# Patient Record
Sex: MALE | Race: WHITE | Employment: OTHER | URBAN - NONMETROPOLITAN AREA
[De-identification: names, ages, dates, MRNs, and addresses within clinical notes are randomized per-mention and may not be internally consistent; named-entity substitution may affect disease eponyms.]

---

## 2017-01-04 ENCOUNTER — OFFICE VISIT (OUTPATIENT)
Dept: CARDIOLOGY | Age: 72
End: 2017-01-04
Payer: MEDICARE

## 2017-01-04 VITALS
HEART RATE: 68 BPM | DIASTOLIC BLOOD PRESSURE: 82 MMHG | SYSTOLIC BLOOD PRESSURE: 120 MMHG | WEIGHT: 221 LBS | BODY MASS INDEX: 31.64 KG/M2 | HEIGHT: 70 IN

## 2017-01-04 DIAGNOSIS — I10 ESSENTIAL HYPERTENSION: Primary | ICD-10-CM

## 2017-01-04 DIAGNOSIS — E78.2 MIXED HYPERLIPIDEMIA: ICD-10-CM

## 2017-01-04 PROCEDURE — 99212 OFFICE O/P EST SF 10 MIN: CPT | Performed by: INTERNAL MEDICINE

## 2017-02-20 RX ORDER — SPIRONOLACTONE 25 MG/1
25 TABLET ORAL DAILY
Qty: 90 TABLET | Refills: 5 | Status: SHIPPED | OUTPATIENT
Start: 2017-02-20 | End: 2017-08-30

## 2017-02-20 RX ORDER — LISINOPRIL 10 MG/1
10 TABLET ORAL 2 TIMES DAILY
Qty: 180 TABLET | Refills: 5 | Status: SHIPPED | OUTPATIENT
Start: 2017-02-20

## 2017-02-20 RX ORDER — CARVEDILOL 3.12 MG/1
3.12 TABLET ORAL 2 TIMES DAILY WITH MEALS
Qty: 180 TABLET | Refills: 5 | Status: SHIPPED | OUTPATIENT
Start: 2017-02-20 | End: 2017-08-30

## 2017-08-30 ENCOUNTER — OFFICE VISIT (OUTPATIENT)
Dept: CARDIOLOGY | Age: 72
End: 2017-08-30
Payer: MEDICARE

## 2017-08-30 VITALS
HEART RATE: 68 BPM | DIASTOLIC BLOOD PRESSURE: 78 MMHG | SYSTOLIC BLOOD PRESSURE: 138 MMHG | HEIGHT: 69 IN | BODY MASS INDEX: 31.84 KG/M2 | WEIGHT: 215 LBS

## 2017-08-30 DIAGNOSIS — R07.89 CHEST PRESSURE: ICD-10-CM

## 2017-08-30 DIAGNOSIS — E78.2 MIXED HYPERLIPIDEMIA: ICD-10-CM

## 2017-08-30 DIAGNOSIS — I10 ESSENTIAL HYPERTENSION: Primary | ICD-10-CM

## 2017-08-30 PROCEDURE — 3017F COLORECTAL CA SCREEN DOC REV: CPT | Performed by: INTERNAL MEDICINE

## 2017-08-30 PROCEDURE — 4040F PNEUMOC VAC/ADMIN/RCVD: CPT | Performed by: INTERNAL MEDICINE

## 2017-08-30 PROCEDURE — 1036F TOBACCO NON-USER: CPT | Performed by: INTERNAL MEDICINE

## 2017-08-30 PROCEDURE — 1123F ACP DISCUSS/DSCN MKR DOCD: CPT | Performed by: INTERNAL MEDICINE

## 2017-08-30 PROCEDURE — G8427 DOCREV CUR MEDS BY ELIG CLIN: HCPCS | Performed by: INTERNAL MEDICINE

## 2017-08-30 PROCEDURE — G8419 CALC BMI OUT NRM PARAM NOF/U: HCPCS | Performed by: INTERNAL MEDICINE

## 2017-08-30 PROCEDURE — 99213 OFFICE O/P EST LOW 20 MIN: CPT | Performed by: INTERNAL MEDICINE

## 2020-11-20 ENCOUNTER — OFFICE VISIT (OUTPATIENT)
Dept: PRIMARY CARE CLINIC | Age: 75
End: 2020-11-20
Payer: MEDICARE

## 2020-11-20 VITALS — TEMPERATURE: 97.7 F

## 2020-11-20 PROCEDURE — G0008 ADMIN INFLUENZA VIRUS VAC: HCPCS | Performed by: NURSE PRACTITIONER

## 2020-11-20 PROCEDURE — 90694 VACC AIIV4 NO PRSRV 0.5ML IM: CPT | Performed by: NURSE PRACTITIONER

## 2020-11-20 NOTE — PROGRESS NOTES
After obtaining consent, and per orders of Leanne Sosa, injection of Flu given by [place]. Patient instructed to remain in clinic for 20 minutes afterwards, and to report any adverse reaction to me immediately.

## 2020-11-20 NOTE — PROGRESS NOTES
After obtaining consent, and per orders of Aaron Matthew, injection of FLU Vaccine given by Cody Thacker. Patient instructed to remain in clinic for 20 minutes afterwards, and to report any adverse reaction to me immediately.

## 2021-10-14 ENCOUNTER — PREP FOR SURGERY (OUTPATIENT)
Dept: OTHER | Facility: HOSPITAL | Age: 76
End: 2021-10-14

## 2021-10-14 ENCOUNTER — OFFICE VISIT (OUTPATIENT)
Dept: CARDIOLOGY | Facility: CLINIC | Age: 76
End: 2021-10-14

## 2021-10-14 VITALS
DIASTOLIC BLOOD PRESSURE: 80 MMHG | HEART RATE: 68 BPM | HEIGHT: 70 IN | SYSTOLIC BLOOD PRESSURE: 136 MMHG | OXYGEN SATURATION: 98 % | WEIGHT: 219 LBS | BODY MASS INDEX: 31.35 KG/M2

## 2021-10-14 DIAGNOSIS — I48.19 ATRIAL FIBRILLATION, PERSISTENT (HCC): Primary | ICD-10-CM

## 2021-10-14 DIAGNOSIS — I10 PRIMARY HYPERTENSION: ICD-10-CM

## 2021-10-14 DIAGNOSIS — I42.8 NON-ISCHEMIC CARDIOMYOPATHY (HCC): ICD-10-CM

## 2021-10-14 PROCEDURE — 93000 ELECTROCARDIOGRAM COMPLETE: CPT | Performed by: INTERNAL MEDICINE

## 2021-10-14 PROCEDURE — 99204 OFFICE O/P NEW MOD 45 MIN: CPT | Performed by: INTERNAL MEDICINE

## 2021-10-14 RX ORDER — ALLOPURINOL 300 MG/1
TABLET ORAL
COMMUNITY

## 2021-10-14 RX ORDER — SPIRONOLACTONE 25 MG/1
25 TABLET ORAL DAILY
COMMUNITY

## 2021-10-14 RX ORDER — LANOLIN ALCOHOL/MO/W.PET/CERES
2000 CREAM (GRAM) TOPICAL DAILY
COMMUNITY

## 2021-10-14 RX ORDER — ATORVASTATIN CALCIUM 20 MG/1
20 TABLET, FILM COATED ORAL DAILY
COMMUNITY

## 2021-10-14 RX ORDER — LISINOPRIL 20 MG/1
20 TABLET ORAL DAILY
COMMUNITY

## 2021-10-14 RX ORDER — TAMSULOSIN HYDROCHLORIDE 0.4 MG/1
1 CAPSULE ORAL DAILY
COMMUNITY

## 2021-10-14 RX ORDER — CHOLECALCIFEROL (VITAMIN D3) 50 MCG
2 TABLET ORAL DAILY
COMMUNITY

## 2021-10-18 ENCOUNTER — LAB (OUTPATIENT)
Dept: LAB | Facility: HOSPITAL | Age: 76
End: 2021-10-18

## 2021-10-18 DIAGNOSIS — I48.19 ATRIAL FIBRILLATION, PERSISTENT (HCC): ICD-10-CM

## 2021-10-18 LAB — SARS-COV-2 ORF1AB RESP QL NAA+PROBE: NOT DETECTED

## 2021-10-18 PROCEDURE — C9803 HOPD COVID-19 SPEC COLLECT: HCPCS

## 2021-10-18 PROCEDURE — U0004 COV-19 TEST NON-CDC HGH THRU: HCPCS

## 2021-10-18 PROCEDURE — U0005 INFEC AGEN DETEC AMPLI PROBE: HCPCS

## 2021-10-19 ENCOUNTER — ANESTHESIA EVENT (OUTPATIENT)
Dept: CARDIOLOGY | Facility: HOSPITAL | Age: 76
End: 2021-10-19

## 2021-10-20 ENCOUNTER — HOSPITAL ENCOUNTER (OUTPATIENT)
Dept: CARDIOLOGY | Facility: HOSPITAL | Age: 76
Discharge: HOME | End: 2021-10-20

## 2021-10-20 ENCOUNTER — ANESTHESIA (OUTPATIENT)
Dept: CARDIOLOGY | Facility: HOSPITAL | Age: 76
End: 2021-10-20

## 2021-10-20 ENCOUNTER — HOSPITAL ENCOUNTER (OUTPATIENT)
Dept: CARDIOLOGY | Facility: HOSPITAL | Age: 76
Setting detail: HOSPITAL OUTPATIENT SURGERY
Discharge: HOME | End: 2021-10-20

## 2021-10-20 VITALS
OXYGEN SATURATION: 100 % | TEMPERATURE: 98.3 F | HEART RATE: 104 BPM | SYSTOLIC BLOOD PRESSURE: 133 MMHG | WEIGHT: 218.92 LBS | DIASTOLIC BLOOD PRESSURE: 87 MMHG | BODY MASS INDEX: 34.36 KG/M2 | HEIGHT: 67 IN | RESPIRATION RATE: 19 BRPM

## 2021-10-20 DIAGNOSIS — I48.19 ATRIAL FIBRILLATION, PERSISTENT (HCC): ICD-10-CM

## 2021-10-20 LAB
MAXIMAL PREDICTED HEART RATE: 145 BPM
STRESS TARGET HR: 123 BPM

## 2021-10-20 PROCEDURE — 25010000002 PROPOFOL 10 MG/ML EMULSION: Performed by: NURSE ANESTHETIST, CERTIFIED REGISTERED

## 2021-10-20 PROCEDURE — 93321 DOPPLER ECHO F-UP/LMTD STD: CPT

## 2021-10-20 PROCEDURE — 93312 ECHO TRANSESOPHAGEAL: CPT

## 2021-10-20 PROCEDURE — 92960 CARDIOVERSION ELECTRIC EXT: CPT

## 2021-10-20 PROCEDURE — 93325 DOPPLER ECHO COLOR FLOW MAPG: CPT | Performed by: INTERNAL MEDICINE

## 2021-10-20 PROCEDURE — 92960 CARDIOVERSION ELECTRIC EXT: CPT | Performed by: INTERNAL MEDICINE

## 2021-10-20 PROCEDURE — 93010 ELECTROCARDIOGRAM REPORT: CPT | Performed by: INTERNAL MEDICINE

## 2021-10-20 PROCEDURE — 93312 ECHO TRANSESOPHAGEAL: CPT | Performed by: INTERNAL MEDICINE

## 2021-10-20 PROCEDURE — 93321 DOPPLER ECHO F-UP/LMTD STD: CPT | Performed by: INTERNAL MEDICINE

## 2021-10-20 PROCEDURE — 93005 ELECTROCARDIOGRAM TRACING: CPT | Performed by: INTERNAL MEDICINE

## 2021-10-20 PROCEDURE — 93325 DOPPLER ECHO COLOR FLOW MAPG: CPT

## 2021-10-20 RX ORDER — AMIODARONE HYDROCHLORIDE 400 MG/1
400 TABLET ORAL 2 TIMES DAILY
Qty: 30 TABLET | Refills: 0 | Status: SHIPPED | OUTPATIENT
Start: 2021-10-20 | End: 2021-11-05 | Stop reason: HOSPADM

## 2021-10-20 RX ORDER — LIDOCAINE HYDROCHLORIDE 20 MG/ML
INJECTION, SOLUTION EPIDURAL; INFILTRATION; INTRACAUDAL; PERINEURAL AS NEEDED
Status: DISCONTINUED | OUTPATIENT
Start: 2021-10-20 | End: 2021-10-20 | Stop reason: SURG

## 2021-10-20 RX ORDER — PROPOFOL 10 MG/ML
VIAL (ML) INTRAVENOUS AS NEEDED
Status: DISCONTINUED | OUTPATIENT
Start: 2021-10-20 | End: 2021-10-20 | Stop reason: SURG

## 2021-10-20 RX ORDER — SODIUM CHLORIDE 9 MG/ML
INJECTION, SOLUTION INTRAVENOUS CONTINUOUS PRN
Status: DISCONTINUED | OUTPATIENT
Start: 2021-10-20 | End: 2021-10-20 | Stop reason: SURG

## 2021-10-20 RX ADMIN — LIDOCAINE HYDROCHLORIDE 200 MG: 20 INJECTION, SOLUTION EPIDURAL; INFILTRATION; INTRACAUDAL; PERINEURAL at 11:01

## 2021-10-20 RX ADMIN — SODIUM CHLORIDE: 9 INJECTION, SOLUTION INTRAVENOUS at 10:55

## 2021-10-20 RX ADMIN — PROPOFOL 200 MG: 10 INJECTION, EMULSION INTRAVENOUS at 11:01

## 2021-10-20 RX ADMIN — PROPOFOL 100 MG: 10 INJECTION, EMULSION INTRAVENOUS at 11:13

## 2021-10-21 LAB
QT INTERVAL: 368 MS
QTC INTERVAL: 507 MS

## 2021-10-22 ENCOUNTER — PREP FOR SURGERY (OUTPATIENT)
Dept: OTHER | Facility: HOSPITAL | Age: 76
End: 2021-10-22

## 2021-10-22 DIAGNOSIS — I48.19 ATRIAL FIBRILLATION, PERSISTENT (HCC): Primary | ICD-10-CM

## 2021-11-02 ENCOUNTER — LAB (OUTPATIENT)
Dept: LAB | Facility: HOSPITAL | Age: 76
End: 2021-11-02

## 2021-11-02 DIAGNOSIS — I48.19 ATRIAL FIBRILLATION, PERSISTENT (HCC): ICD-10-CM

## 2021-11-02 LAB — SARS-COV-2 ORF1AB RESP QL NAA+PROBE: NOT DETECTED

## 2021-11-02 PROCEDURE — U0004 COV-19 TEST NON-CDC HGH THRU: HCPCS

## 2021-11-02 PROCEDURE — C9803 HOPD COVID-19 SPEC COLLECT: HCPCS

## 2021-11-02 PROCEDURE — U0005 INFEC AGEN DETEC AMPLI PROBE: HCPCS

## 2021-11-05 ENCOUNTER — ANESTHESIA (OUTPATIENT)
Dept: CARDIOLOGY | Facility: HOSPITAL | Age: 76
End: 2021-11-05

## 2021-11-05 ENCOUNTER — HOSPITAL ENCOUNTER (OUTPATIENT)
Dept: CARDIOLOGY | Facility: HOSPITAL | Age: 76
Discharge: HOME | End: 2021-11-05

## 2021-11-05 ENCOUNTER — ANESTHESIA EVENT (OUTPATIENT)
Dept: CARDIOLOGY | Facility: HOSPITAL | Age: 76
End: 2021-11-05

## 2021-11-05 VITALS
DIASTOLIC BLOOD PRESSURE: 75 MMHG | BODY MASS INDEX: 31.26 KG/M2 | OXYGEN SATURATION: 98 % | RESPIRATION RATE: 18 BRPM | HEIGHT: 70 IN | WEIGHT: 218.4 LBS | SYSTOLIC BLOOD PRESSURE: 132 MMHG | HEART RATE: 76 BPM | TEMPERATURE: 97 F

## 2021-11-05 DIAGNOSIS — I48.19 ATRIAL FIBRILLATION, PERSISTENT (HCC): ICD-10-CM

## 2021-11-05 DIAGNOSIS — I48.21 PERMANENT ATRIAL FIBRILLATION (HCC): ICD-10-CM

## 2021-11-05 LAB
BH CV ECHO MEAS - TR MAX PG: 40 MMHG
BH CV ECHO MEAS - TR MAX VEL: 3 CM/SEC
MAXIMAL PREDICTED HEART RATE: 145 BPM
MAXIMAL PREDICTED HEART RATE: 145 BPM
STRESS TARGET HR: 123 BPM
STRESS TARGET HR: 123 BPM

## 2021-11-05 PROCEDURE — 93010 ELECTROCARDIOGRAM REPORT: CPT | Performed by: INTERNAL MEDICINE

## 2021-11-05 PROCEDURE — 92960 CARDIOVERSION ELECTRIC EXT: CPT

## 2021-11-05 PROCEDURE — 92960 CARDIOVERSION ELECTRIC EXT: CPT | Performed by: INTERNAL MEDICINE

## 2021-11-05 PROCEDURE — 93005 ELECTROCARDIOGRAM TRACING: CPT | Performed by: INTERNAL MEDICINE

## 2021-11-05 PROCEDURE — 25010000002 PROPOFOL 10 MG/ML EMULSION

## 2021-11-05 RX ORDER — SODIUM CHLORIDE 0.9 % (FLUSH) 0.9 %
10 SYRINGE (ML) INJECTION EVERY 12 HOURS SCHEDULED
Status: DISCONTINUED | OUTPATIENT
Start: 2021-11-05 | End: 2021-11-06 | Stop reason: HOSPADM

## 2021-11-05 RX ORDER — PROPOFOL 10 MG/ML
VIAL (ML) INTRAVENOUS AS NEEDED
Status: DISCONTINUED | OUTPATIENT
Start: 2021-11-05 | End: 2021-11-05 | Stop reason: SURG

## 2021-11-05 RX ORDER — SODIUM CHLORIDE 0.9 % (FLUSH) 0.9 %
10 SYRINGE (ML) INJECTION AS NEEDED
Status: DISCONTINUED | OUTPATIENT
Start: 2021-11-05 | End: 2021-11-06 | Stop reason: HOSPADM

## 2021-11-05 RX ORDER — LIDOCAINE HYDROCHLORIDE 20 MG/ML
INJECTION, SOLUTION EPIDURAL; INFILTRATION; INTRACAUDAL; PERINEURAL AS NEEDED
Status: DISCONTINUED | OUTPATIENT
Start: 2021-11-05 | End: 2021-11-05 | Stop reason: SURG

## 2021-11-05 RX ADMIN — LIDOCAINE HYDROCHLORIDE 40 MG: 20 INJECTION, SOLUTION EPIDURAL; INFILTRATION; INTRACAUDAL; PERINEURAL at 08:15

## 2021-11-05 RX ADMIN — PROPOFOL 100 MG: 10 INJECTION, EMULSION INTRAVENOUS at 08:21

## 2021-11-05 RX ADMIN — PROPOFOL 100 MG: 10 INJECTION, EMULSION INTRAVENOUS at 08:15

## 2021-11-06 LAB
QT INTERVAL: 438 MS
QTC INTERVAL: 517 MS

## 2022-02-11 ENCOUNTER — OFFICE VISIT (OUTPATIENT)
Dept: CARDIOLOGY | Facility: CLINIC | Age: 77
End: 2022-02-11

## 2022-02-11 VITALS
BODY MASS INDEX: 31.35 KG/M2 | DIASTOLIC BLOOD PRESSURE: 73 MMHG | WEIGHT: 219 LBS | HEART RATE: 114 BPM | SYSTOLIC BLOOD PRESSURE: 126 MMHG | OXYGEN SATURATION: 95 % | HEIGHT: 70 IN

## 2022-02-11 DIAGNOSIS — I10 PRIMARY HYPERTENSION: ICD-10-CM

## 2022-02-11 DIAGNOSIS — I48.21 PERMANENT ATRIAL FIBRILLATION: Primary | ICD-10-CM

## 2022-02-11 DIAGNOSIS — I42.8 NON-ISCHEMIC CARDIOMYOPATHY: ICD-10-CM

## 2022-02-11 PROCEDURE — 99214 OFFICE O/P EST MOD 30 MIN: CPT | Performed by: INTERNAL MEDICINE

## 2022-02-11 PROCEDURE — 93000 ELECTROCARDIOGRAM COMPLETE: CPT | Performed by: INTERNAL MEDICINE

## 2022-02-11 RX ORDER — METOPROLOL SUCCINATE 25 MG/1
25 TABLET, EXTENDED RELEASE ORAL
Qty: 90 TABLET | Refills: 3 | Status: SHIPPED | OUTPATIENT
Start: 2022-02-11 | End: 2022-09-21 | Stop reason: SDUPTHER

## 2022-03-30 ENCOUNTER — OFFICE VISIT (OUTPATIENT)
Dept: GASTROENTEROLOGY | Facility: CLINIC | Age: 77
End: 2022-03-30

## 2022-03-30 VITALS
WEIGHT: 217 LBS | BODY MASS INDEX: 32.14 KG/M2 | SYSTOLIC BLOOD PRESSURE: 130 MMHG | DIASTOLIC BLOOD PRESSURE: 74 MMHG | HEART RATE: 74 BPM | OXYGEN SATURATION: 100 % | TEMPERATURE: 98 F | HEIGHT: 69 IN

## 2022-03-30 DIAGNOSIS — Z79.01 ANTICOAGULATED: Primary | ICD-10-CM

## 2022-03-30 DIAGNOSIS — D64.9 ANEMIA, UNSPECIFIED TYPE: ICD-10-CM

## 2022-03-30 PROBLEM — Z12.11 ENCOUNTER FOR SCREENING FOR MALIGNANT NEOPLASM OF COLON: Status: ACTIVE | Noted: 2022-03-30

## 2022-03-30 PROCEDURE — 99204 OFFICE O/P NEW MOD 45 MIN: CPT | Performed by: NURSE PRACTITIONER

## 2022-03-30 RX ORDER — POLYETHYLENE GLYCOL 3350 17 G/17G
238 POWDER, FOR SOLUTION ORAL ONCE
Qty: 238 G | Refills: 0 | Status: SHIPPED | OUTPATIENT
Start: 2022-03-30 | End: 2022-03-30

## 2022-03-30 RX ORDER — ALBUTEROL SULFATE 90 UG/1
2 AEROSOL, METERED RESPIRATORY (INHALATION) EVERY 4 HOURS PRN
COMMUNITY

## 2022-04-15 ENCOUNTER — HOSPITAL ENCOUNTER (OUTPATIENT)
Facility: HOSPITAL | Age: 77
Setting detail: HOSPITAL OUTPATIENT SURGERY
Discharge: HOME | End: 2022-04-15
Attending: INTERNAL MEDICINE | Admitting: INTERNAL MEDICINE

## 2022-04-15 ENCOUNTER — TELEPHONE (OUTPATIENT)
Dept: GASTROENTEROLOGY | Facility: CLINIC | Age: 77
End: 2022-04-15

## 2022-04-15 ENCOUNTER — ANESTHESIA EVENT (OUTPATIENT)
Dept: GASTROENTEROLOGY | Facility: HOSPITAL | Age: 77
End: 2022-04-15

## 2022-04-15 ENCOUNTER — ANESTHESIA (OUTPATIENT)
Dept: GASTROENTEROLOGY | Facility: HOSPITAL | Age: 77
End: 2022-04-15

## 2022-04-15 VITALS
HEART RATE: 108 BPM | HEIGHT: 67 IN | SYSTOLIC BLOOD PRESSURE: 126 MMHG | DIASTOLIC BLOOD PRESSURE: 83 MMHG | BODY MASS INDEX: 34.53 KG/M2 | OXYGEN SATURATION: 99 % | WEIGHT: 220 LBS | TEMPERATURE: 97.2 F | RESPIRATION RATE: 18 BRPM

## 2022-04-15 DIAGNOSIS — D64.9 ANEMIA, UNSPECIFIED TYPE: ICD-10-CM

## 2022-04-15 PROCEDURE — 25010000002 PROPOFOL 10 MG/ML EMULSION: Performed by: NURSE ANESTHETIST, CERTIFIED REGISTERED

## 2022-04-15 PROCEDURE — 45385 COLONOSCOPY W/LESION REMOVAL: CPT | Performed by: INTERNAL MEDICINE

## 2022-04-15 PROCEDURE — 88305 TISSUE EXAM BY PATHOLOGIST: CPT | Performed by: INTERNAL MEDICINE

## 2022-04-15 RX ORDER — SODIUM CHLORIDE 0.9 % (FLUSH) 0.9 %
10 SYRINGE (ML) INJECTION AS NEEDED
Status: DISCONTINUED | OUTPATIENT
Start: 2022-04-15 | End: 2022-04-15 | Stop reason: HOSPADM

## 2022-04-15 RX ORDER — LIDOCAINE HYDROCHLORIDE 20 MG/ML
INJECTION, SOLUTION EPIDURAL; INFILTRATION; INTRACAUDAL; PERINEURAL AS NEEDED
Status: DISCONTINUED | OUTPATIENT
Start: 2022-04-15 | End: 2022-04-15 | Stop reason: SURG

## 2022-04-15 RX ORDER — SODIUM CHLORIDE 9 MG/ML
500 INJECTION, SOLUTION INTRAVENOUS CONTINUOUS PRN
Status: DISCONTINUED | OUTPATIENT
Start: 2022-04-15 | End: 2022-04-15 | Stop reason: HOSPADM

## 2022-04-15 RX ORDER — SODIUM CHLORIDE 0.9 % (FLUSH) 0.9 %
10 SYRINGE (ML) INJECTION EVERY 12 HOURS SCHEDULED
Status: CANCELLED | OUTPATIENT
Start: 2022-04-15

## 2022-04-15 RX ORDER — PROPOFOL 10 MG/ML
VIAL (ML) INTRAVENOUS AS NEEDED
Status: DISCONTINUED | OUTPATIENT
Start: 2022-04-15 | End: 2022-04-15 | Stop reason: SURG

## 2022-04-15 RX ORDER — SODIUM CHLORIDE 9 MG/ML
100 INJECTION, SOLUTION INTRAVENOUS CONTINUOUS
Status: CANCELLED | OUTPATIENT
Start: 2022-04-15

## 2022-04-15 RX ORDER — SODIUM CHLORIDE 0.9 % (FLUSH) 0.9 %
10 SYRINGE (ML) INJECTION AS NEEDED
Status: CANCELLED | OUTPATIENT
Start: 2022-04-15

## 2022-04-15 RX ADMIN — SODIUM CHLORIDE 500 ML: 9 INJECTION, SOLUTION INTRAVENOUS at 07:58

## 2022-04-15 RX ADMIN — LIDOCAINE HYDROCHLORIDE 50 MG: 20 INJECTION, SOLUTION EPIDURAL; INFILTRATION; INTRACAUDAL; PERINEURAL at 09:22

## 2022-04-15 RX ADMIN — PROPOFOL 250 MG: 10 INJECTION, EMULSION INTRAVENOUS at 09:22

## 2022-04-18 LAB
CYTO UR: NORMAL
LAB AP CASE REPORT: NORMAL
PATH REPORT.FINAL DX SPEC: NORMAL
PATH REPORT.GROSS SPEC: NORMAL

## 2022-09-21 ENCOUNTER — OFFICE VISIT (OUTPATIENT)
Dept: CARDIOLOGY | Facility: CLINIC | Age: 77
End: 2022-09-21

## 2022-09-21 VITALS
DIASTOLIC BLOOD PRESSURE: 72 MMHG | BODY MASS INDEX: 34.72 KG/M2 | HEIGHT: 67 IN | HEART RATE: 80 BPM | SYSTOLIC BLOOD PRESSURE: 128 MMHG | OXYGEN SATURATION: 100 % | WEIGHT: 221.2 LBS

## 2022-09-21 DIAGNOSIS — I42.8 NON-ISCHEMIC CARDIOMYOPATHY: ICD-10-CM

## 2022-09-21 DIAGNOSIS — I48.21 PERMANENT ATRIAL FIBRILLATION: Primary | ICD-10-CM

## 2022-09-21 DIAGNOSIS — I10 PRIMARY HYPERTENSION: ICD-10-CM

## 2022-09-21 PROCEDURE — 99214 OFFICE O/P EST MOD 30 MIN: CPT | Performed by: NURSE PRACTITIONER

## 2022-09-21 PROCEDURE — 93000 ELECTROCARDIOGRAM COMPLETE: CPT | Performed by: NURSE PRACTITIONER

## 2022-09-21 RX ORDER — METOPROLOL SUCCINATE 50 MG/1
50 TABLET, EXTENDED RELEASE ORAL
Qty: 90 TABLET | Refills: 3 | Status: SHIPPED | OUTPATIENT
Start: 2022-09-21

## 2022-12-15 ENCOUNTER — TELEPHONE (OUTPATIENT)
Dept: CARDIOLOGY | Facility: CLINIC | Age: 77
End: 2022-12-15

## 2022-12-19 ENCOUNTER — TELEPHONE (OUTPATIENT)
Dept: CARDIOLOGY | Facility: CLINIC | Age: 77
End: 2022-12-19

## 2022-12-30 ENCOUNTER — PRE-ADMISSION TESTING (OUTPATIENT)
Dept: PREADMISSION TESTING | Facility: HOSPITAL | Age: 77
End: 2022-12-30
Payer: MEDICARE

## 2022-12-30 ENCOUNTER — HOSPITAL ENCOUNTER (OUTPATIENT)
Dept: GENERAL RADIOLOGY | Facility: HOSPITAL | Age: 77
Discharge: HOME | End: 2022-12-30
Payer: MEDICARE

## 2022-12-30 VITALS
SYSTOLIC BLOOD PRESSURE: 135 MMHG | OXYGEN SATURATION: 100 % | BODY MASS INDEX: 33.35 KG/M2 | RESPIRATION RATE: 24 BRPM | HEART RATE: 101 BPM | DIASTOLIC BLOOD PRESSURE: 82 MMHG | HEIGHT: 68 IN | WEIGHT: 220.02 LBS

## 2022-12-30 LAB
ALBUMIN SERPL-MCNC: 3.9 G/DL (ref 3.5–5.2)
ALBUMIN/GLOB SERPL: 1.7 G/DL
ALP SERPL-CCNC: 78 U/L (ref 39–117)
ALT SERPL W P-5'-P-CCNC: 10 U/L (ref 1–41)
ANION GAP SERPL CALCULATED.3IONS-SCNC: 11 MMOL/L (ref 5–15)
AST SERPL-CCNC: 18 U/L (ref 1–40)
BILIRUB SERPL-MCNC: 0.7 MG/DL (ref 0–1.2)
BILIRUB UR QL STRIP: NEGATIVE
BUN SERPL-MCNC: 11 MG/DL (ref 8–23)
BUN/CREAT SERPL: 12.4 (ref 7–25)
CALCIUM SPEC-SCNC: 8.5 MG/DL (ref 8.6–10.5)
CHLORIDE SERPL-SCNC: 103 MMOL/L (ref 98–107)
CLARITY UR: CLEAR
CO2 SERPL-SCNC: 24 MMOL/L (ref 22–29)
COLOR UR: ABNORMAL
CREAT SERPL-MCNC: 0.89 MG/DL (ref 0.76–1.27)
DEPRECATED RDW RBC AUTO: 62.6 FL (ref 37–54)
EGFRCR SERPLBLD CKD-EPI 2021: 88.8 ML/MIN/1.73
ERYTHROCYTE [DISTWIDTH] IN BLOOD BY AUTOMATED COUNT: 15.6 % (ref 12.3–15.4)
GLOBULIN UR ELPH-MCNC: 2.3 GM/DL
GLUCOSE SERPL-MCNC: 83 MG/DL (ref 65–99)
GLUCOSE UR STRIP-MCNC: NEGATIVE MG/DL
HCT VFR BLD AUTO: 32.5 % (ref 37.5–51)
HGB BLD-MCNC: 10.3 G/DL (ref 13–17.7)
HGB UR QL STRIP.AUTO: NEGATIVE
INR PPP: 2.54 (ref 0.91–1.09)
KETONES UR QL STRIP: ABNORMAL
LEUKOCYTE ESTERASE UR QL STRIP.AUTO: NEGATIVE
MCH RBC QN AUTO: 34.3 PG (ref 26.6–33)
MCHC RBC AUTO-ENTMCNC: 31.7 G/DL (ref 31.5–35.7)
MCV RBC AUTO: 108.3 FL (ref 79–97)
NITRITE UR QL STRIP: NEGATIVE
PH UR STRIP.AUTO: 5.5 [PH] (ref 5–8)
PLATELET # BLD AUTO: 157 10*3/MM3 (ref 140–450)
PMV BLD AUTO: 9.9 FL (ref 6–12)
POTASSIUM SERPL-SCNC: 4.4 MMOL/L (ref 3.5–5.2)
PROT SERPL-MCNC: 6.2 G/DL (ref 6–8.5)
PROT UR QL STRIP: NEGATIVE
PROTHROMBIN TIME: 27.7 SECONDS (ref 11.8–14.8)
RBC # BLD AUTO: 3 10*6/MM3 (ref 4.14–5.8)
SODIUM SERPL-SCNC: 138 MMOL/L (ref 136–145)
SP GR UR STRIP: 1.02 (ref 1–1.03)
UROBILINOGEN UR QL STRIP: ABNORMAL
WBC NRBC COR # BLD: 5.23 10*3/MM3 (ref 3.4–10.8)

## 2022-12-30 PROCEDURE — 85027 COMPLETE CBC AUTOMATED: CPT

## 2022-12-30 PROCEDURE — 71045 X-RAY EXAM CHEST 1 VIEW: CPT

## 2022-12-30 PROCEDURE — 93010 ELECTROCARDIOGRAM REPORT: CPT | Performed by: INTERNAL MEDICINE

## 2022-12-30 PROCEDURE — 81003 URINALYSIS AUTO W/O SCOPE: CPT

## 2022-12-30 PROCEDURE — 93005 ELECTROCARDIOGRAM TRACING: CPT

## 2022-12-30 PROCEDURE — 80053 COMPREHEN METABOLIC PANEL: CPT

## 2022-12-30 PROCEDURE — 85610 PROTHROMBIN TIME: CPT

## 2022-12-30 PROCEDURE — 36415 COLL VENOUS BLD VENIPUNCTURE: CPT

## 2023-01-01 PROBLEM — M19.012 PRIMARY OSTEOARTHRITIS OF LEFT SHOULDER: Status: ACTIVE | Noted: 2023-01-01

## 2023-01-02 LAB
QT INTERVAL: 300 MS
QTC INTERVAL: 427 MS

## 2023-01-03 ENCOUNTER — ANESTHESIA (OUTPATIENT)
Dept: PERIOP | Facility: HOSPITAL | Age: 78
End: 2023-01-03
Payer: MEDICARE

## 2023-01-03 ENCOUNTER — HOSPITAL ENCOUNTER (OUTPATIENT)
Facility: HOSPITAL | Age: 78
Setting detail: HOSPITAL OUTPATIENT SURGERY
Discharge: HOME | End: 2023-01-03
Attending: ORTHOPAEDIC SURGERY | Admitting: ORTHOPAEDIC SURGERY
Payer: MEDICARE

## 2023-01-03 ENCOUNTER — ANESTHESIA EVENT (OUTPATIENT)
Dept: PERIOP | Facility: HOSPITAL | Age: 78
End: 2023-01-03
Payer: MEDICARE

## 2023-01-03 ENCOUNTER — APPOINTMENT (OUTPATIENT)
Dept: GENERAL RADIOLOGY | Facility: HOSPITAL | Age: 78
End: 2023-01-03
Payer: MEDICARE

## 2023-01-03 VITALS
OXYGEN SATURATION: 92 % | SYSTOLIC BLOOD PRESSURE: 111 MMHG | RESPIRATION RATE: 20 BRPM | DIASTOLIC BLOOD PRESSURE: 77 MMHG | TEMPERATURE: 97 F | HEART RATE: 98 BPM

## 2023-01-03 DIAGNOSIS — M19.012 PRIMARY OSTEOARTHRITIS OF LEFT SHOULDER: Primary | ICD-10-CM

## 2023-01-03 LAB
ABO GROUP BLD: NORMAL
BLD GP AB SCN SERPL QL: NEGATIVE
INR PPP: 1.15 (ref 0.91–1.09)
PROTHROMBIN TIME: 14.9 SECONDS (ref 11.8–14.8)
RH BLD: POSITIVE
T&S EXPIRATION DATE: NORMAL

## 2023-01-03 PROCEDURE — 25010000002 MIDAZOLAM PER 1 MG: Performed by: ANESTHESIOLOGY

## 2023-01-03 PROCEDURE — 25010000002 CEFAZOLIN PER 500 MG: Performed by: ORTHOPAEDIC SURGERY

## 2023-01-03 PROCEDURE — C1776 JOINT DEVICE (IMPLANTABLE): HCPCS | Performed by: ORTHOPAEDIC SURGERY

## 2023-01-03 PROCEDURE — 86901 BLOOD TYPING SEROLOGIC RH(D): CPT | Performed by: ORTHOPAEDIC SURGERY

## 2023-01-03 PROCEDURE — 73030 X-RAY EXAM OF SHOULDER: CPT

## 2023-01-03 PROCEDURE — 0 BUPIVACAINE LIPOSOME 1.3 % SUSPENSION: Performed by: ANESTHESIOLOGY

## 2023-01-03 PROCEDURE — 86900 BLOOD TYPING SEROLOGIC ABO: CPT | Performed by: ORTHOPAEDIC SURGERY

## 2023-01-03 PROCEDURE — 85610 PROTHROMBIN TIME: CPT | Performed by: ORTHOPAEDIC SURGERY

## 2023-01-03 PROCEDURE — C9290 INJ, BUPIVACAINE LIPOSOME: HCPCS | Performed by: ANESTHESIOLOGY

## 2023-01-03 PROCEDURE — 25010000002 FENTANYL CITRATE (PF) 50 MCG/ML SOLUTION: Performed by: ANESTHESIOLOGY

## 2023-01-03 PROCEDURE — 86850 RBC ANTIBODY SCREEN: CPT | Performed by: ORTHOPAEDIC SURGERY

## 2023-01-03 PROCEDURE — 25010000002 PROPOFOL 10 MG/ML EMULSION: Performed by: NURSE ANESTHETIST, CERTIFIED REGISTERED

## 2023-01-03 DEVICE — IMPLANTABLE DEVICE: Type: IMPLANTABLE DEVICE | Site: SHOULDER | Status: FUNCTIONAL

## 2023-01-03 DEVICE — STEM HUM/SHLDR UNIVERS REVERS SZ9: Type: IMPLANTABLE DEVICE | Site: SHOULDER | Status: FUNCTIONAL

## 2023-01-03 DEVICE — GLENOSPHERE UNIVERS REVERS M/39 PLS4 LAT: Type: IMPLANTABLE DEVICE | Site: SHOULDER | Status: FUNCTIONAL

## 2023-01-03 DEVICE — SCRW GLEN UNIVERS REVERS CENTRL 6.5X20MM: Type: IMPLANTABLE DEVICE | Site: SHOULDER | Status: FUNCTIONAL

## 2023-01-03 DEVICE — BASEPLT GLEN UNIVERS REVERS P/COAT MD: Type: IMPLANTABLE DEVICE | Site: SHOULDER | Status: FUNCTIONAL

## 2023-01-03 DEVICE — CUP SUT UNIVERS REVERS 39 NTRL: Type: IMPLANTABLE DEVICE | Site: SHOULDER | Status: FUNCTIONAL

## 2023-01-03 DEVICE — LINER HUM UNIVERS REVERS MD 39 PLS3MM: Type: IMPLANTABLE DEVICE | Site: SHOULDER | Status: FUNCTIONAL

## 2023-01-03 DEVICE — SCRW GLEN UNIVERS REVERS PERIPH 4.5X36MM: Type: IMPLANTABLE DEVICE | Site: SHOULDER | Status: FUNCTIONAL

## 2023-01-03 RX ORDER — DROPERIDOL 2.5 MG/ML
0.62 INJECTION, SOLUTION INTRAMUSCULAR; INTRAVENOUS ONCE AS NEEDED
Status: DISCONTINUED | OUTPATIENT
Start: 2023-01-03 | End: 2023-01-03 | Stop reason: HOSPADM

## 2023-01-03 RX ORDER — OXYCODONE AND ACETAMINOPHEN 7.5; 325 MG/1; MG/1
2 TABLET ORAL EVERY 4 HOURS PRN
Status: DISCONTINUED | OUTPATIENT
Start: 2023-01-03 | End: 2023-01-03 | Stop reason: HOSPADM

## 2023-01-03 RX ORDER — SODIUM CHLORIDE 0.9 % (FLUSH) 0.9 %
3-10 SYRINGE (ML) INJECTION AS NEEDED
Status: DISCONTINUED | OUTPATIENT
Start: 2023-01-03 | End: 2023-01-03 | Stop reason: HOSPADM

## 2023-01-03 RX ORDER — MIDAZOLAM HYDROCHLORIDE 1 MG/ML
0.5 INJECTION INTRAMUSCULAR; INTRAVENOUS
Status: DISCONTINUED | OUTPATIENT
Start: 2023-01-03 | End: 2023-01-03 | Stop reason: HOSPADM

## 2023-01-03 RX ORDER — LABETALOL HYDROCHLORIDE 5 MG/ML
5 INJECTION, SOLUTION INTRAVENOUS
Status: DISCONTINUED | OUTPATIENT
Start: 2023-01-03 | End: 2023-01-03 | Stop reason: HOSPADM

## 2023-01-03 RX ORDER — SODIUM CHLORIDE, SODIUM LACTATE, POTASSIUM CHLORIDE, CALCIUM CHLORIDE 600; 310; 30; 20 MG/100ML; MG/100ML; MG/100ML; MG/100ML
100 INJECTION, SOLUTION INTRAVENOUS CONTINUOUS PRN
Status: DISCONTINUED | OUTPATIENT
Start: 2023-01-03 | End: 2023-01-03 | Stop reason: HOSPADM

## 2023-01-03 RX ORDER — SODIUM CHLORIDE 9 MG/ML
40 INJECTION, SOLUTION INTRAVENOUS AS NEEDED
Status: DISCONTINUED | OUTPATIENT
Start: 2023-01-03 | End: 2023-01-03 | Stop reason: HOSPADM

## 2023-01-03 RX ORDER — ONDANSETRON 2 MG/ML
4 INJECTION INTRAMUSCULAR; INTRAVENOUS ONCE AS NEEDED
Status: DISCONTINUED | OUTPATIENT
Start: 2023-01-03 | End: 2023-01-03 | Stop reason: HOSPADM

## 2023-01-03 RX ORDER — SODIUM CHLORIDE 0.9 % (FLUSH) 0.9 %
10 SYRINGE (ML) INJECTION AS NEEDED
Status: DISCONTINUED | OUTPATIENT
Start: 2023-01-03 | End: 2023-01-03 | Stop reason: HOSPADM

## 2023-01-03 RX ORDER — PROPOFOL 10 MG/ML
VIAL (ML) INTRAVENOUS AS NEEDED
Status: DISCONTINUED | OUTPATIENT
Start: 2023-01-03 | End: 2023-01-03 | Stop reason: SURG

## 2023-01-03 RX ORDER — SODIUM CHLORIDE, SODIUM LACTATE, POTASSIUM CHLORIDE, CALCIUM CHLORIDE 600; 310; 30; 20 MG/100ML; MG/100ML; MG/100ML; MG/100ML
100 INJECTION, SOLUTION INTRAVENOUS CONTINUOUS
Status: DISCONTINUED | OUTPATIENT
Start: 2023-01-03 | End: 2023-01-03 | Stop reason: HOSPADM

## 2023-01-03 RX ORDER — ONDANSETRON 4 MG/1
4 TABLET, FILM COATED ORAL EVERY 8 HOURS PRN
Qty: 10 TABLET | Refills: 0 | Status: SHIPPED | OUTPATIENT
Start: 2023-01-03

## 2023-01-03 RX ORDER — MAGNESIUM HYDROXIDE 1200 MG/15ML
LIQUID ORAL AS NEEDED
Status: DISCONTINUED | OUTPATIENT
Start: 2023-01-03 | End: 2023-01-03 | Stop reason: HOSPADM

## 2023-01-03 RX ORDER — BUPIVACAINE HCL/0.9 % NACL/PF 0.1 %
2 PLASTIC BAG, INJECTION (ML) EPIDURAL ONCE
Status: COMPLETED | OUTPATIENT
Start: 2023-01-03 | End: 2023-01-03

## 2023-01-03 RX ORDER — SODIUM CHLORIDE 0.9 % (FLUSH) 0.9 %
10 SYRINGE (ML) INJECTION EVERY 12 HOURS SCHEDULED
Status: DISCONTINUED | OUTPATIENT
Start: 2023-01-03 | End: 2023-01-03 | Stop reason: HOSPADM

## 2023-01-03 RX ORDER — FENTANYL CITRATE 50 UG/ML
25 INJECTION, SOLUTION INTRAMUSCULAR; INTRAVENOUS
Status: DISCONTINUED | OUTPATIENT
Start: 2023-01-03 | End: 2023-01-03 | Stop reason: HOSPADM

## 2023-01-03 RX ORDER — NALOXONE HCL 0.4 MG/ML
0.4 VIAL (ML) INJECTION AS NEEDED
Status: DISCONTINUED | OUTPATIENT
Start: 2023-01-03 | End: 2023-01-03 | Stop reason: HOSPADM

## 2023-01-03 RX ORDER — SODIUM CHLORIDE 0.9 % (FLUSH) 0.9 %
3 SYRINGE (ML) INJECTION EVERY 12 HOURS SCHEDULED
Status: DISCONTINUED | OUTPATIENT
Start: 2023-01-03 | End: 2023-01-03 | Stop reason: HOSPADM

## 2023-01-03 RX ORDER — BUPIVACAINE HYDROCHLORIDE 5 MG/ML
INJECTION, SOLUTION EPIDURAL; INTRACAUDAL
Status: COMPLETED | OUTPATIENT
Start: 2023-01-03 | End: 2023-01-03

## 2023-01-03 RX ORDER — LIDOCAINE HYDROCHLORIDE 10 MG/ML
0.5 INJECTION, SOLUTION EPIDURAL; INFILTRATION; INTRACAUDAL; PERINEURAL ONCE AS NEEDED
Status: DISCONTINUED | OUTPATIENT
Start: 2023-01-03 | End: 2023-01-03 | Stop reason: HOSPADM

## 2023-01-03 RX ORDER — OXYCODONE AND ACETAMINOPHEN 10; 325 MG/1; MG/1
1 TABLET ORAL ONCE AS NEEDED
Status: COMPLETED | OUTPATIENT
Start: 2023-01-03 | End: 2023-01-03

## 2023-01-03 RX ORDER — LIDOCAINE HYDROCHLORIDE 40 MG/ML
SOLUTION TOPICAL AS NEEDED
Status: DISCONTINUED | OUTPATIENT
Start: 2023-01-03 | End: 2023-01-03 | Stop reason: SURG

## 2023-01-03 RX ORDER — FENTANYL CITRATE 50 UG/ML
50 INJECTION, SOLUTION INTRAMUSCULAR; INTRAVENOUS ONCE
Status: COMPLETED | OUTPATIENT
Start: 2023-01-03 | End: 2023-01-03

## 2023-01-03 RX ORDER — OXYCODONE AND ACETAMINOPHEN 10; 325 MG/1; MG/1
1 TABLET ORAL EVERY 6 HOURS PRN
Qty: 20 TABLET | Refills: 0 | Status: SHIPPED | OUTPATIENT
Start: 2023-01-03

## 2023-01-03 RX ORDER — IBUPROFEN 600 MG/1
600 TABLET ORAL ONCE AS NEEDED
Status: DISCONTINUED | OUTPATIENT
Start: 2023-01-03 | End: 2023-01-03 | Stop reason: HOSPADM

## 2023-01-03 RX ORDER — ACETAMINOPHEN 500 MG
1000 TABLET ORAL ONCE
Status: COMPLETED | OUTPATIENT
Start: 2023-01-03 | End: 2023-01-03

## 2023-01-03 RX ORDER — ROCURONIUM BROMIDE 10 MG/ML
INJECTION, SOLUTION INTRAVENOUS AS NEEDED
Status: DISCONTINUED | OUTPATIENT
Start: 2023-01-03 | End: 2023-01-03 | Stop reason: SURG

## 2023-01-03 RX ORDER — MIDAZOLAM HYDROCHLORIDE 1 MG/ML
2 INJECTION INTRAMUSCULAR; INTRAVENOUS ONCE
Status: COMPLETED | OUTPATIENT
Start: 2023-01-03 | End: 2023-01-03

## 2023-01-03 RX ORDER — FLUMAZENIL 0.1 MG/ML
0.2 INJECTION INTRAVENOUS AS NEEDED
Status: DISCONTINUED | OUTPATIENT
Start: 2023-01-03 | End: 2023-01-03 | Stop reason: HOSPADM

## 2023-01-03 RX ORDER — NEOSTIGMINE METHYLSULFATE 5 MG/5 ML
SYRINGE (ML) INTRAVENOUS AS NEEDED
Status: DISCONTINUED | OUTPATIENT
Start: 2023-01-03 | End: 2023-01-03 | Stop reason: SURG

## 2023-01-03 RX ADMIN — LIDOCAINE HYDROCHLORIDE 1 EACH: 40 SOLUTION TOPICAL at 17:11

## 2023-01-03 RX ADMIN — Medication 3.5 MG: at 18:07

## 2023-01-03 RX ADMIN — ACETAMINOPHEN TAB 500 MG 1000 MG: 500 TAB at 15:55

## 2023-01-03 RX ADMIN — FENTANYL CITRATE 50 MCG: 50 INJECTION INTRAMUSCULAR; INTRAVENOUS at 15:55

## 2023-01-03 RX ADMIN — ROCURONIUM BROMIDE 50 MG: 50 INJECTION INTRAVENOUS at 17:11

## 2023-01-03 RX ADMIN — BUPIVACAINE 10 ML: 13.3 INJECTION, SUSPENSION, LIPOSOMAL INFILTRATION at 15:59

## 2023-01-03 RX ADMIN — PROPOFOL 200 MG: 10 INJECTION, EMULSION INTRAVENOUS at 17:11

## 2023-01-03 RX ADMIN — SODIUM CHLORIDE, POTASSIUM CHLORIDE, SODIUM LACTATE AND CALCIUM CHLORIDE 100 ML/HR: 600; 310; 30; 20 INJECTION, SOLUTION INTRAVENOUS at 15:55

## 2023-01-03 RX ADMIN — Medication 2 G: at 17:19

## 2023-01-03 RX ADMIN — BUPIVACAINE HYDROCHLORIDE 10 ML: 5 INJECTION, SOLUTION EPIDURAL; INTRACAUDAL; PERINEURAL at 15:59

## 2023-01-03 RX ADMIN — GLYCOPYRROLATE 0.4 MG: 0.2 INJECTION INTRAMUSCULAR; INTRAVENOUS at 18:07

## 2023-01-03 RX ADMIN — MIDAZOLAM HYDROCHLORIDE 2 MG: 2 INJECTION, SOLUTION INTRAMUSCULAR; INTRAVENOUS at 15:55

## 2023-01-03 RX ADMIN — OXYCODONE AND ACETAMINOPHEN 1 TABLET: 325; 10 TABLET ORAL at 18:33

## 2023-04-25 ENCOUNTER — OFFICE VISIT (OUTPATIENT)
Dept: CARDIOLOGY | Facility: CLINIC | Age: 78
End: 2023-04-25
Payer: MEDICARE

## 2023-04-25 VITALS
WEIGHT: 220 LBS | HEART RATE: 105 BPM | SYSTOLIC BLOOD PRESSURE: 108 MMHG | HEIGHT: 68 IN | OXYGEN SATURATION: 96 % | BODY MASS INDEX: 33.34 KG/M2 | DIASTOLIC BLOOD PRESSURE: 62 MMHG

## 2023-04-25 DIAGNOSIS — I48.21 PERMANENT ATRIAL FIBRILLATION: Primary | ICD-10-CM

## 2023-04-25 DIAGNOSIS — I42.8 NON-ISCHEMIC CARDIOMYOPATHY: ICD-10-CM

## 2023-04-25 DIAGNOSIS — I10 PRIMARY HYPERTENSION: ICD-10-CM

## 2023-04-25 PROCEDURE — 99214 OFFICE O/P EST MOD 30 MIN: CPT | Performed by: NURSE PRACTITIONER

## 2023-04-25 PROCEDURE — 1160F RVW MEDS BY RX/DR IN RCRD: CPT | Performed by: NURSE PRACTITIONER

## 2023-04-25 PROCEDURE — 3074F SYST BP LT 130 MM HG: CPT | Performed by: NURSE PRACTITIONER

## 2023-04-25 PROCEDURE — 3078F DIAST BP <80 MM HG: CPT | Performed by: NURSE PRACTITIONER

## 2023-04-25 PROCEDURE — 1159F MED LIST DOCD IN RCRD: CPT | Performed by: NURSE PRACTITIONER

## 2023-04-25 PROCEDURE — 93000 ELECTROCARDIOGRAM COMPLETE: CPT | Performed by: NURSE PRACTITIONER

## 2023-04-25 RX ORDER — FERROUS SULFATE 325(65) MG
325 TABLET ORAL
COMMUNITY

## 2023-04-25 RX ORDER — MAGNESIUM 200 MG
TABLET ORAL
COMMUNITY

## 2023-04-25 RX ORDER — FUROSEMIDE 20 MG/1
20 TABLET ORAL DAILY
COMMUNITY
Start: 2023-03-09

## 2023-04-27 DIAGNOSIS — D64.9 ANEMIA REQUIRING TRANSFUSIONS: ICD-10-CM

## 2023-04-27 DIAGNOSIS — I48.21 PERMANENT ATRIAL FIBRILLATION: Primary | ICD-10-CM

## 2023-05-01 ENCOUNTER — LAB (OUTPATIENT)
Dept: LAB | Facility: HOSPITAL | Age: 78
End: 2023-05-01
Payer: MEDICARE

## 2023-05-01 ENCOUNTER — OFFICE VISIT (OUTPATIENT)
Dept: CARDIOLOGY | Facility: CLINIC | Age: 78
End: 2023-05-01
Payer: MEDICARE

## 2023-05-01 VITALS
BODY MASS INDEX: 33.95 KG/M2 | WEIGHT: 224 LBS | DIASTOLIC BLOOD PRESSURE: 45 MMHG | HEIGHT: 68 IN | HEART RATE: 67 BPM | SYSTOLIC BLOOD PRESSURE: 90 MMHG | OXYGEN SATURATION: 99 %

## 2023-05-01 DIAGNOSIS — I48.21 PERMANENT ATRIAL FIBRILLATION: Primary | ICD-10-CM

## 2023-05-01 DIAGNOSIS — I10 PRIMARY HYPERTENSION: ICD-10-CM

## 2023-05-01 DIAGNOSIS — D64.9 ANEMIA, UNSPECIFIED TYPE: ICD-10-CM

## 2023-05-01 DIAGNOSIS — Z87.11 H/O GASTRIC ULCER: ICD-10-CM

## 2023-05-01 LAB
ANISOCYTOSIS BLD QL: ABNORMAL
BASO STIPL COARSE BLD QL SMEAR: ABNORMAL
DEPRECATED RDW RBC AUTO: 84.1 FL (ref 37–54)
EOSINOPHIL # BLD MANUAL: 0.24 10*3/MM3 (ref 0–0.4)
EOSINOPHIL NFR BLD MANUAL: 4.1 % (ref 0.3–6.2)
ERYTHROCYTE [DISTWIDTH] IN BLOOD BY AUTOMATED COUNT: 21.4 % (ref 12.3–15.4)
HCT VFR BLD AUTO: 25.3 % (ref 37.5–51)
HGB BLD-MCNC: 7.7 G/DL (ref 13–17.7)
LYMPHOCYTES # BLD MANUAL: 0.91 10*3/MM3 (ref 0.7–3.1)
LYMPHOCYTES NFR BLD MANUAL: 5.1 % (ref 5–12)
MACROCYTES BLD QL SMEAR: ABNORMAL
MCH RBC QN AUTO: 32.8 PG (ref 26.6–33)
MCHC RBC AUTO-ENTMCNC: 30.4 G/DL (ref 31.5–35.7)
MCV RBC AUTO: 107.7 FL (ref 79–97)
MONOCYTES # BLD: 0.3 10*3/MM3 (ref 0.1–0.9)
NEUTROPHILS # BLD AUTO: 4.49 10*3/MM3 (ref 1.7–7)
NEUTROPHILS NFR BLD MANUAL: 75.5 % (ref 42.7–76)
NRBC SPEC MANUAL: 1 /100 WBC (ref 0–0.2)
PLAT MORPH BLD: NORMAL
PLATELET # BLD AUTO: 186 10*3/MM3 (ref 140–450)
PMV BLD AUTO: 9.5 FL (ref 6–12)
POIKILOCYTOSIS BLD QL SMEAR: ABNORMAL
POLYCHROMASIA BLD QL SMEAR: ABNORMAL
RBC # BLD AUTO: 2.35 10*6/MM3 (ref 4.14–5.8)
VARIANT LYMPHS NFR BLD MANUAL: 13.3 % (ref 19.6–45.3)
VARIANT LYMPHS NFR BLD MANUAL: 2 % (ref 0–5)
WBC MORPH BLD: NORMAL
WBC NRBC COR # BLD: 5.95 10*3/MM3 (ref 3.4–10.8)

## 2023-05-01 PROCEDURE — 1160F RVW MEDS BY RX/DR IN RCRD: CPT | Performed by: INTERNAL MEDICINE

## 2023-05-01 PROCEDURE — 85007 BL SMEAR W/DIFF WBC COUNT: CPT

## 2023-05-01 PROCEDURE — 3074F SYST BP LT 130 MM HG: CPT | Performed by: INTERNAL MEDICINE

## 2023-05-01 PROCEDURE — 85025 COMPLETE CBC W/AUTO DIFF WBC: CPT

## 2023-05-01 PROCEDURE — 36415 COLL VENOUS BLD VENIPUNCTURE: CPT

## 2023-05-01 PROCEDURE — 1159F MED LIST DOCD IN RCRD: CPT | Performed by: INTERNAL MEDICINE

## 2023-05-01 PROCEDURE — 99214 OFFICE O/P EST MOD 30 MIN: CPT | Performed by: INTERNAL MEDICINE

## 2023-05-01 PROCEDURE — 3078F DIAST BP <80 MM HG: CPT | Performed by: INTERNAL MEDICINE

## 2023-05-02 PROBLEM — Z87.11 H/O GASTRIC ULCER: Status: ACTIVE | Noted: 2023-05-02

## 2023-05-03 ENCOUNTER — OFFICE VISIT (OUTPATIENT)
Dept: GASTROENTEROLOGY | Facility: CLINIC | Age: 78
End: 2023-05-03
Payer: MEDICARE

## 2023-05-03 ENCOUNTER — OFFICE VISIT (OUTPATIENT)
Dept: INTERNAL MEDICINE | Facility: CLINIC | Age: 78
End: 2023-05-03
Payer: MEDICARE

## 2023-05-03 VITALS
HEIGHT: 68 IN | DIASTOLIC BLOOD PRESSURE: 62 MMHG | SYSTOLIC BLOOD PRESSURE: 110 MMHG | WEIGHT: 224 LBS | TEMPERATURE: 97.5 F | HEART RATE: 57 BPM | OXYGEN SATURATION: 97 % | BODY MASS INDEX: 33.95 KG/M2

## 2023-05-03 VITALS
TEMPERATURE: 97.5 F | HEART RATE: 64 BPM | DIASTOLIC BLOOD PRESSURE: 60 MMHG | HEIGHT: 67 IN | SYSTOLIC BLOOD PRESSURE: 104 MMHG | OXYGEN SATURATION: 97 % | WEIGHT: 222.6 LBS | BODY MASS INDEX: 34.94 KG/M2

## 2023-05-03 DIAGNOSIS — D64.9 ANEMIA, UNSPECIFIED TYPE: Primary | ICD-10-CM

## 2023-05-03 DIAGNOSIS — I48.21 PERMANENT ATRIAL FIBRILLATION: ICD-10-CM

## 2023-05-03 DIAGNOSIS — Z79.01 ANTICOAGULATED: ICD-10-CM

## 2023-05-03 DIAGNOSIS — I10 PRIMARY HYPERTENSION: Primary | ICD-10-CM

## 2023-05-03 DIAGNOSIS — Z87.11 H/O GASTRIC ULCER: ICD-10-CM

## 2023-05-03 DIAGNOSIS — Z79.899 ENCOUNTER FOR LONG-TERM CURRENT USE OF MEDICATION: ICD-10-CM

## 2023-05-03 DIAGNOSIS — Z78.9 ALCOHOL USE: ICD-10-CM

## 2023-05-03 DIAGNOSIS — D64.9 ANEMIA, UNSPECIFIED TYPE: ICD-10-CM

## 2023-05-03 PROBLEM — F10.90 ALCOHOL USE: Status: ACTIVE | Noted: 2023-05-03

## 2023-05-03 PROCEDURE — 3078F DIAST BP <80 MM HG: CPT | Performed by: INTERNAL MEDICINE

## 2023-05-03 PROCEDURE — 99204 OFFICE O/P NEW MOD 45 MIN: CPT | Performed by: INTERNAL MEDICINE

## 2023-05-03 PROCEDURE — 3074F SYST BP LT 130 MM HG: CPT | Performed by: INTERNAL MEDICINE

## 2023-05-03 PROCEDURE — 1170F FXNL STATUS ASSESSED: CPT | Performed by: INTERNAL MEDICINE

## 2023-05-03 RX ORDER — PANTOPRAZOLE SODIUM 40 MG/1
40 TABLET, DELAYED RELEASE ORAL DAILY
Qty: 30 TABLET | Refills: 2 | Status: SHIPPED | OUTPATIENT
Start: 2023-05-03

## 2023-05-04 LAB
ALBUMIN SERPL-MCNC: 4.1 G/DL (ref 3.5–5.2)
ALBUMIN/GLOB SERPL: 2.4 G/DL
ALP SERPL-CCNC: 94 U/L (ref 39–117)
ALT SERPL-CCNC: 13 U/L (ref 1–41)
AST SERPL-CCNC: 15 U/L (ref 1–40)
BASOPHILS # BLD AUTO: 0.01 10*3/MM3 (ref 0–0.2)
BASOPHILS NFR BLD AUTO: 0.2 % (ref 0–1.5)
BILIRUB SERPL-MCNC: 0.5 MG/DL (ref 0–1.2)
BUN SERPL-MCNC: 21 MG/DL (ref 8–23)
BUN/CREAT SERPL: 16 (ref 7–25)
CALCIUM SERPL-MCNC: 9.4 MG/DL (ref 8.6–10.5)
CHLORIDE SERPL-SCNC: 101 MMOL/L (ref 98–107)
CO2 SERPL-SCNC: 24.7 MMOL/L (ref 22–29)
CREAT SERPL-MCNC: 1.31 MG/DL (ref 0.76–1.27)
EGFRCR SERPLBLD CKD-EPI 2021: 56.1 ML/MIN/1.73
EOSINOPHIL # BLD AUTO: 0.19 10*3/MM3 (ref 0–0.4)
EOSINOPHIL NFR BLD AUTO: 4.6 % (ref 0.3–6.2)
ERYTHROCYTE [DISTWIDTH] IN BLOOD BY AUTOMATED COUNT: 19 % (ref 12.3–15.4)
GLOBULIN SER CALC-MCNC: 1.7 GM/DL
GLUCOSE SERPL-MCNC: 85 MG/DL (ref 65–99)
HCT VFR BLD AUTO: 24.4 % (ref 37.5–51)
HGB BLD-MCNC: 7.8 G/DL (ref 13–17.7)
IMM GRANULOCYTES # BLD AUTO: 0.02 10*3/MM3 (ref 0–0.05)
IMM GRANULOCYTES NFR BLD AUTO: 0.5 % (ref 0–0.5)
LYMPHOCYTES # BLD AUTO: 1.06 10*3/MM3 (ref 0.7–3.1)
LYMPHOCYTES NFR BLD AUTO: 25.9 % (ref 19.6–45.3)
MCH RBC QN AUTO: 33.3 PG (ref 26.6–33)
MCHC RBC AUTO-ENTMCNC: 32 G/DL (ref 31.5–35.7)
MCV RBC AUTO: 104.3 FL (ref 79–97)
MONOCYTES # BLD AUTO: 0.3 10*3/MM3 (ref 0.1–0.9)
MONOCYTES NFR BLD AUTO: 7.3 % (ref 5–12)
NEUTROPHILS # BLD AUTO: 2.51 10*3/MM3 (ref 1.7–7)
NEUTROPHILS NFR BLD AUTO: 61.5 % (ref 42.7–76)
NRBC BLD AUTO-RTO: 0.2 /100 WBC (ref 0–0.2)
PLATELET # BLD AUTO: 194 10*3/MM3 (ref 140–450)
POTASSIUM SERPL-SCNC: 5.4 MMOL/L (ref 3.5–5.2)
PROT SERPL-MCNC: 5.8 G/DL (ref 6–8.5)
RBC # BLD AUTO: 2.34 10*6/MM3 (ref 4.14–5.8)
SODIUM SERPL-SCNC: 135 MMOL/L (ref 136–145)
WBC # BLD AUTO: 4.09 10*3/MM3 (ref 3.4–10.8)

## 2023-05-05 LAB
FERRITIN SERPL-MCNC: 64.9 NG/ML (ref 30–400)
FOLATE SERPL-MCNC: 10.8 NG/ML (ref 4.78–24.2)
IRON SERPL-MCNC: 206 MCG/DL (ref 59–158)
VIT B12 SERPL-MCNC: 1009 PG/ML (ref 211–946)
WRITTEN AUTHORIZATION: NORMAL

## 2023-05-09 ENCOUNTER — ANESTHESIA EVENT (OUTPATIENT)
Dept: GASTROENTEROLOGY | Facility: HOSPITAL | Age: 78
End: 2023-05-09
Payer: MEDICARE

## 2023-05-09 ENCOUNTER — ANESTHESIA (OUTPATIENT)
Dept: GASTROENTEROLOGY | Facility: HOSPITAL | Age: 78
End: 2023-05-09
Payer: MEDICARE

## 2023-05-09 ENCOUNTER — HOSPITAL ENCOUNTER (OUTPATIENT)
Facility: HOSPITAL | Age: 78
Setting detail: HOSPITAL OUTPATIENT SURGERY
Discharge: HOME | End: 2023-05-09
Attending: INTERNAL MEDICINE | Admitting: INTERNAL MEDICINE
Payer: MEDICARE

## 2023-05-09 VITALS
TEMPERATURE: 97.1 F | HEART RATE: 95 BPM | HEIGHT: 67 IN | DIASTOLIC BLOOD PRESSURE: 74 MMHG | RESPIRATION RATE: 19 BRPM | OXYGEN SATURATION: 98 % | SYSTOLIC BLOOD PRESSURE: 112 MMHG | BODY MASS INDEX: 34.53 KG/M2 | WEIGHT: 220 LBS

## 2023-05-09 DIAGNOSIS — D64.9 ANEMIA, UNSPECIFIED TYPE: ICD-10-CM

## 2023-05-09 PROCEDURE — 87081 CULTURE SCREEN ONLY: CPT | Performed by: INTERNAL MEDICINE

## 2023-05-09 PROCEDURE — 43239 EGD BIOPSY SINGLE/MULTIPLE: CPT | Performed by: INTERNAL MEDICINE

## 2023-05-09 PROCEDURE — 25010000002 PROPOFOL 10 MG/ML EMULSION: Performed by: NURSE ANESTHETIST, CERTIFIED REGISTERED

## 2023-05-09 RX ORDER — LIDOCAINE HYDROCHLORIDE 10 MG/ML
0.5 INJECTION, SOLUTION EPIDURAL; INFILTRATION; INTRACAUDAL; PERINEURAL ONCE AS NEEDED
Status: CANCELLED | OUTPATIENT
Start: 2023-05-09

## 2023-05-09 RX ORDER — SODIUM CHLORIDE 9 MG/ML
500 INJECTION, SOLUTION INTRAVENOUS CONTINUOUS PRN
Status: DISCONTINUED | OUTPATIENT
Start: 2023-05-09 | End: 2023-05-09 | Stop reason: HOSPADM

## 2023-05-09 RX ORDER — PROPOFOL 10 MG/ML
VIAL (ML) INTRAVENOUS AS NEEDED
Status: DISCONTINUED | OUTPATIENT
Start: 2023-05-09 | End: 2023-05-09 | Stop reason: SURG

## 2023-05-09 RX ORDER — LIDOCAINE HYDROCHLORIDE 20 MG/ML
INJECTION, SOLUTION EPIDURAL; INFILTRATION; INTRACAUDAL; PERINEURAL AS NEEDED
Status: DISCONTINUED | OUTPATIENT
Start: 2023-05-09 | End: 2023-05-09 | Stop reason: SURG

## 2023-05-09 RX ORDER — SODIUM CHLORIDE 0.9 % (FLUSH) 0.9 %
10 SYRINGE (ML) INJECTION AS NEEDED
Status: DISCONTINUED | OUTPATIENT
Start: 2023-05-09 | End: 2023-05-09 | Stop reason: HOSPADM

## 2023-05-09 RX ADMIN — PROPOFOL INJECTABLE EMULSION 70 MG: 10 INJECTION, EMULSION INTRAVENOUS at 11:45

## 2023-05-09 RX ADMIN — SODIUM CHLORIDE 500 ML: 9 INJECTION, SOLUTION INTRAVENOUS at 10:18

## 2023-05-09 RX ADMIN — LIDOCAINE HYDROCHLORIDE 50 MG: 20 INJECTION, SOLUTION EPIDURAL; INFILTRATION; INTRACAUDAL; PERINEURAL at 11:45

## 2023-05-10 LAB — UREASE TISS QL: NEGATIVE

## 2023-05-17 DIAGNOSIS — Z87.11 H/O GASTRIC ULCER: ICD-10-CM

## 2023-05-17 DIAGNOSIS — D64.9 ANEMIA, UNSPECIFIED TYPE: Primary | ICD-10-CM

## 2023-05-17 LAB
EXPIRATION DATE 2: ABNORMAL
EXPIRATION DATE 3: ABNORMAL
EXPIRATION DATE: ABNORMAL
GASTROCULT GAST QL: POSITIVE
HEMOCCULT SP2 STL QL: POSITIVE
HEMOCCULT SP3 STL QL: POSITIVE
Lab: ABNORMAL

## 2023-05-17 PROCEDURE — 82274 ASSAY TEST FOR BLOOD FECAL: CPT | Performed by: INTERNAL MEDICINE

## 2023-05-18 ENCOUNTER — OFFICE VISIT (OUTPATIENT)
Dept: INTERNAL MEDICINE | Facility: CLINIC | Age: 78
End: 2023-05-18
Payer: MEDICARE

## 2023-05-18 VITALS
SYSTOLIC BLOOD PRESSURE: 110 MMHG | DIASTOLIC BLOOD PRESSURE: 50 MMHG | HEART RATE: 109 BPM | BODY MASS INDEX: 35.31 KG/M2 | HEIGHT: 67 IN | RESPIRATION RATE: 18 BRPM | TEMPERATURE: 97.8 F | WEIGHT: 225 LBS | OXYGEN SATURATION: 100 %

## 2023-05-18 DIAGNOSIS — Z78.9 ALCOHOL USE: ICD-10-CM

## 2023-05-18 DIAGNOSIS — I48.21 PERMANENT ATRIAL FIBRILLATION: ICD-10-CM

## 2023-05-18 DIAGNOSIS — Z79.01 CHRONIC ANTICOAGULATION: ICD-10-CM

## 2023-05-18 DIAGNOSIS — K29.90 GASTRITIS AND DUODENITIS: Chronic | ICD-10-CM

## 2023-05-18 DIAGNOSIS — I10 PRIMARY HYPERTENSION: Primary | ICD-10-CM

## 2023-05-18 DIAGNOSIS — D53.9 MACROCYTIC ANEMIA: ICD-10-CM

## 2023-05-18 PROBLEM — T14.8XXA SKIN ABRASION: Status: ACTIVE | Noted: 2023-05-18

## 2023-05-19 DIAGNOSIS — D53.9 MACROCYTIC ANEMIA: Primary | ICD-10-CM

## 2023-05-19 LAB
ALBUMIN SERPL-MCNC: 4.2 G/DL (ref 3.5–5.2)
ALBUMIN/GLOB SERPL: 2.5 G/DL
ALP SERPL-CCNC: 82 U/L (ref 39–117)
ALT SERPL-CCNC: 7 U/L (ref 1–41)
APTT PPP: 50.2 SECONDS (ref 24.1–35)
AST SERPL-CCNC: 11 U/L (ref 1–40)
BASOPHILS # BLD AUTO: 0.02 10*3/MM3 (ref 0–0.2)
BASOPHILS NFR BLD AUTO: 0.5 % (ref 0–1.5)
BILIRUB SERPL-MCNC: 0.5 MG/DL (ref 0–1.2)
BUN SERPL-MCNC: 21 MG/DL (ref 8–23)
BUN/CREAT SERPL: 19.8 (ref 7–25)
CALCIUM SERPL-MCNC: 9.2 MG/DL (ref 8.6–10.5)
CHLORIDE SERPL-SCNC: 102 MMOL/L (ref 98–107)
CO2 SERPL-SCNC: 23.5 MMOL/L (ref 22–29)
CREAT SERPL-MCNC: 1.06 MG/DL (ref 0.76–1.27)
EGFRCR SERPLBLD CKD-EPI 2021: 72.3 ML/MIN/1.73
EOSINOPHIL # BLD AUTO: 0.18 10*3/MM3 (ref 0–0.4)
EOSINOPHIL NFR BLD AUTO: 4.1 % (ref 0.3–6.2)
ERYTHROCYTE [DISTWIDTH] IN BLOOD BY AUTOMATED COUNT: 18.1 % (ref 12.3–15.4)
GLOBULIN SER CALC-MCNC: 1.7 GM/DL
GLUCOSE SERPL-MCNC: 95 MG/DL (ref 65–99)
HCT VFR BLD AUTO: 24.4 % (ref 37.5–51)
HGB BLD-MCNC: 8.1 G/DL (ref 13–17.7)
IMM GRANULOCYTES # BLD AUTO: 0.02 10*3/MM3 (ref 0–0.05)
IMM GRANULOCYTES NFR BLD AUTO: 0.5 % (ref 0–0.5)
INR PPP: 3.03 (ref 0.91–1.09)
LYMPHOCYTES # BLD AUTO: 1.08 10*3/MM3 (ref 0.7–3.1)
LYMPHOCYTES NFR BLD AUTO: 24.8 % (ref 19.6–45.3)
MCH RBC QN AUTO: 34.3 PG (ref 26.6–33)
MCHC RBC AUTO-ENTMCNC: 33.2 G/DL (ref 31.5–35.7)
MCV RBC AUTO: 103.4 FL (ref 79–97)
MONOCYTES # BLD AUTO: 0.28 10*3/MM3 (ref 0.1–0.9)
MONOCYTES NFR BLD AUTO: 6.4 % (ref 5–12)
NEUTROPHILS # BLD AUTO: 2.78 10*3/MM3 (ref 1.7–7)
NEUTROPHILS NFR BLD AUTO: 63.7 % (ref 42.7–76)
NRBC BLD AUTO-RTO: 0.2 /100 WBC (ref 0–0.2)
PLATELET # BLD AUTO: 181 10*3/MM3 (ref 140–450)
POTASSIUM SERPL-SCNC: 5.7 MMOL/L (ref 3.5–5.2)
PROT SERPL-MCNC: 5.9 G/DL (ref 6–8.5)
PROTHROMBIN TIME: 31.8 SECONDS (ref 11.8–14.8)
RBC # BLD AUTO: 2.36 10*6/MM3 (ref 4.14–5.8)
SODIUM SERPL-SCNC: 134 MMOL/L (ref 136–145)
WBC # BLD AUTO: 4.36 10*3/MM3 (ref 3.4–10.8)

## 2023-05-24 ENCOUNTER — PATIENT ROUNDING (BHMG ONLY) (OUTPATIENT)
Dept: INTERNAL MEDICINE | Facility: CLINIC | Age: 78
End: 2023-05-24
Payer: MEDICARE

## 2023-05-25 DIAGNOSIS — D53.9 MACROCYTIC ANEMIA: ICD-10-CM

## 2023-05-30 LAB
ALBUMIN SERPL-MCNC: 3.9 G/DL (ref 3.7–4.7)
ALBUMIN/GLOB SERPL: 2.8 {RATIO} (ref 1.2–2.2)
ALP SERPL-CCNC: 86 IU/L (ref 44–121)
ALT SERPL-CCNC: 9 IU/L (ref 0–44)
AST SERPL-CCNC: 13 IU/L (ref 0–40)
BASOPHILS # BLD AUTO: 0 X10E3/UL (ref 0–0.2)
BASOPHILS NFR BLD AUTO: 0 %
BILIRUB SERPL-MCNC: 0.4 MG/DL (ref 0–1.2)
BUN SERPL-MCNC: 23 MG/DL (ref 8–27)
BUN/CREAT SERPL: 17 (ref 10–24)
CALCIUM SERPL-MCNC: 9.1 MG/DL (ref 8.6–10.2)
CHLORIDE SERPL-SCNC: 100 MMOL/L (ref 96–106)
CO2 SERPL-SCNC: 22 MMOL/L (ref 20–29)
CREAT SERPL-MCNC: 1.32 MG/DL (ref 0.76–1.27)
EGFRCR SERPLBLD CKD-EPI 2021: 56 ML/MIN/1.73
EOSINOPHIL # BLD AUTO: 0.3 X10E3/UL (ref 0–0.4)
EOSINOPHIL NFR BLD AUTO: 6 %
ERYTHROCYTE [DISTWIDTH] IN BLOOD BY AUTOMATED COUNT: 18.4 % (ref 11.6–15.4)
GLOBULIN SER CALC-MCNC: 1.4 G/DL (ref 1.5–4.5)
GLUCOSE SERPL-MCNC: 89 MG/DL (ref 70–99)
HAPTOGLOB SERPL-MCNC: 91 MG/DL (ref 34–355)
HCT VFR BLD AUTO: 22.1 % (ref 37.5–51)
HGB BLD-MCNC: 7.3 G/DL (ref 13–17.7)
IMM GRANULOCYTES # BLD AUTO: 0 X10E3/UL (ref 0–0.1)
IMM GRANULOCYTES NFR BLD AUTO: 1 %
LDH SERPL L TO P-CCNC: 138 IU/L (ref 121–224)
LYMPHOCYTES # BLD AUTO: 1 X10E3/UL (ref 0.7–3.1)
LYMPHOCYTES NFR BLD AUTO: 23 %
MCH RBC QN AUTO: 34.4 PG (ref 26.6–33)
MCHC RBC AUTO-ENTMCNC: 33 G/DL (ref 31.5–35.7)
MCV RBC AUTO: 104 FL (ref 79–97)
MONOCYTES # BLD AUTO: 0.3 X10E3/UL (ref 0.1–0.9)
MONOCYTES NFR BLD AUTO: 8 %
NEUTROPHILS # BLD AUTO: 2.8 X10E3/UL (ref 1.4–7)
NEUTROPHILS NFR BLD AUTO: 62 %
PATH INTERP BLD-IMP: ABNORMAL
PATH REV BLD -IMP: ABNORMAL
PATHOLOGIST NAME: ABNORMAL
PLATELET # BLD AUTO: 201 X10E3/UL (ref 150–450)
POTASSIUM SERPL-SCNC: 5.4 MMOL/L (ref 3.5–5.2)
PROT SERPL-MCNC: 5.3 G/DL (ref 6–8.5)
RBC # BLD AUTO: 2.12 X10E6/UL (ref 4.14–5.8)
RETICS/RBC NFR AUTO: 3.7 % (ref 0.6–2.6)
SODIUM SERPL-SCNC: 132 MMOL/L (ref 134–144)
WBC # BLD AUTO: 4.5 X10E3/UL (ref 3.4–10.8)

## 2023-06-01 PROBLEM — S51.011A SKIN TEAR OF RIGHT ELBOW WITHOUT COMPLICATION: Status: ACTIVE | Noted: 2023-06-01

## 2023-06-05 ENCOUNTER — TELEPHONE (OUTPATIENT)
Dept: GASTROENTEROLOGY | Facility: CLINIC | Age: 78
End: 2023-06-05
Payer: MEDICARE

## 2023-06-05 DIAGNOSIS — K92.2 GASTROINTESTINAL HEMORRHAGE, UNSPECIFIED GASTROINTESTINAL HEMORRHAGE TYPE: Primary | ICD-10-CM

## 2023-06-12 ENCOUNTER — TELEPHONE (OUTPATIENT)
Dept: CARDIOLOGY | Facility: CLINIC | Age: 78
End: 2023-06-12
Payer: MEDICARE

## 2023-06-19 ENCOUNTER — OFFICE VISIT (OUTPATIENT)
Dept: INTERNAL MEDICINE | Facility: CLINIC | Age: 78
End: 2023-06-19
Payer: MEDICARE

## 2023-06-19 ENCOUNTER — TELEPHONE (OUTPATIENT)
Dept: INTERNAL MEDICINE | Facility: CLINIC | Age: 78
End: 2023-06-19

## 2023-06-19 VITALS
SYSTOLIC BLOOD PRESSURE: 104 MMHG | TEMPERATURE: 97.1 F | OXYGEN SATURATION: 98 % | DIASTOLIC BLOOD PRESSURE: 56 MMHG | WEIGHT: 223.8 LBS | HEIGHT: 67 IN | BODY MASS INDEX: 35.12 KG/M2 | HEART RATE: 76 BPM

## 2023-06-19 DIAGNOSIS — I87.2 VENOUS INSUFFICIENCY: ICD-10-CM

## 2023-06-19 DIAGNOSIS — Z87.11 H/O GASTRIC ULCER: ICD-10-CM

## 2023-06-19 DIAGNOSIS — Z79.01 CHRONIC ANTICOAGULATION: ICD-10-CM

## 2023-06-19 DIAGNOSIS — D53.9 MACROCYTIC ANEMIA: Primary | ICD-10-CM

## 2023-06-19 DIAGNOSIS — K29.90 GASTRITIS AND DUODENITIS: Chronic | ICD-10-CM

## 2023-06-19 DIAGNOSIS — I10 PRIMARY HYPERTENSION: ICD-10-CM

## 2023-06-19 DIAGNOSIS — I42.8 NON-ISCHEMIC CARDIOMYOPATHY: ICD-10-CM

## 2023-06-19 DIAGNOSIS — I48.21 PERMANENT ATRIAL FIBRILLATION: ICD-10-CM

## 2023-06-19 LAB
ALBUMIN SERPL-MCNC: 3.9 G/DL (ref 3.5–5.2)
ALBUMIN/GLOB SERPL: 1.8 G/DL
ALP SERPL-CCNC: 97 U/L (ref 39–117)
ALT SERPL W P-5'-P-CCNC: 9 U/L (ref 1–41)
ANION GAP SERPL CALCULATED.3IONS-SCNC: 11 MMOL/L (ref 5–15)
AST SERPL-CCNC: 16 U/L (ref 1–40)
BILIRUB SERPL-MCNC: 0.4 MG/DL (ref 0–1.2)
BUN SERPL-MCNC: 19 MG/DL (ref 8–23)
BUN/CREAT SERPL: 14.6 (ref 7–25)
CALCIUM SPEC-SCNC: 9.1 MG/DL (ref 8.6–10.5)
CHLORIDE SERPL-SCNC: 106 MMOL/L (ref 98–107)
CO2 SERPL-SCNC: 21 MMOL/L (ref 22–29)
CREAT SERPL-MCNC: 1.3 MG/DL (ref 0.76–1.27)
EGFRCR SERPLBLD CKD-EPI 2021: 56.6 ML/MIN/1.73
GLOBULIN UR ELPH-MCNC: 2.2 GM/DL
GLUCOSE SERPL-MCNC: 108 MG/DL (ref 65–99)
POTASSIUM SERPL-SCNC: 4.5 MMOL/L (ref 3.5–5.2)
PROT SERPL-MCNC: 6.1 G/DL (ref 6–8.5)
SODIUM SERPL-SCNC: 138 MMOL/L (ref 136–145)

## 2023-06-19 RX ORDER — HEPARIN SODIUM (PORCINE) LOCK FLUSH IV SOLN 100 UNIT/ML 100 UNIT/ML
500 SOLUTION INTRAVENOUS AS NEEDED
OUTPATIENT
Start: 2023-06-20

## 2023-06-19 RX ORDER — SODIUM CHLORIDE 0.9 % (FLUSH) 0.9 %
10 SYRINGE (ML) INJECTION AS NEEDED
OUTPATIENT
Start: 2023-06-20

## 2023-06-20 LAB
FOLATE SERPL-MCNC: >20 NG/ML (ref 4.78–24.2)
VIT B12 BLD-MCNC: 1057 PG/ML (ref 211–946)

## 2023-06-27 PROBLEM — D50.9 IRON DEFICIENCY ANEMIA: Status: ACTIVE | Noted: 2023-06-27

## 2023-07-22 ENCOUNTER — APPOINTMENT (OUTPATIENT)
Dept: CT IMAGING | Facility: HOSPITAL | Age: 78
DRG: 309 | End: 2023-07-22
Payer: MEDICARE

## 2023-07-22 ENCOUNTER — HOSPITAL ENCOUNTER (INPATIENT)
Facility: HOSPITAL | Age: 78
LOS: 3 days | Discharge: HOME | DRG: 309 | End: 2023-07-25
Attending: EMERGENCY MEDICINE | Admitting: INTERNAL MEDICINE
Payer: MEDICARE

## 2023-07-22 ENCOUNTER — APPOINTMENT (OUTPATIENT)
Dept: GENERAL RADIOLOGY | Facility: HOSPITAL | Age: 78
DRG: 309 | End: 2023-07-22
Payer: MEDICARE

## 2023-07-22 DIAGNOSIS — I50.9 CONGESTIVE HEART FAILURE, UNSPECIFIED HF CHRONICITY, UNSPECIFIED HEART FAILURE TYPE: ICD-10-CM

## 2023-07-22 DIAGNOSIS — J44.1 COPD EXACERBATION: ICD-10-CM

## 2023-07-22 DIAGNOSIS — D64.9 ANEMIA, UNSPECIFIED TYPE: ICD-10-CM

## 2023-07-22 DIAGNOSIS — I48.91 ATRIAL FIBRILLATION WITH RAPID VENTRICULAR RESPONSE: Primary | ICD-10-CM

## 2023-07-22 LAB
ABO GROUP BLD: NORMAL
ANION GAP SERPL CALCULATED.3IONS-SCNC: 9 MMOL/L (ref 5–15)
BASOPHILS # BLD AUTO: 0.02 10*3/MM3 (ref 0–0.2)
BASOPHILS NFR BLD AUTO: 0.3 % (ref 0–1.5)
BLD GP AB SCN SERPL QL: NEGATIVE
BUN SERPL-MCNC: 6 MG/DL (ref 8–23)
BUN/CREAT SERPL: 6.7 (ref 7–25)
CALCIUM SPEC-SCNC: 8.7 MG/DL (ref 8.6–10.5)
CHLORIDE SERPL-SCNC: 98 MMOL/L (ref 98–107)
CO2 SERPL-SCNC: 27 MMOL/L (ref 22–29)
CREAT SERPL-MCNC: 0.89 MG/DL (ref 0.76–1.27)
D DIMER PPP FEU-MCNC: 1.01 MCGFEU/ML (ref 0–0.77)
DEPRECATED RDW RBC AUTO: 66.5 FL (ref 37–54)
EGFRCR SERPLBLD CKD-EPI 2021: 88.3 ML/MIN/1.73
EOSINOPHIL # BLD AUTO: 0.1 10*3/MM3 (ref 0–0.4)
EOSINOPHIL NFR BLD AUTO: 1.7 % (ref 0.3–6.2)
ERYTHROCYTE [DISTWIDTH] IN BLOOD BY AUTOMATED COUNT: 19.9 % (ref 12.3–15.4)
FERRITIN SERPL-MCNC: 40.52 NG/ML (ref 30–400)
GEN 5 2HR TROPONIN T REFLEX: 18 NG/L
GLUCOSE SERPL-MCNC: 110 MG/DL (ref 65–99)
HCT VFR BLD AUTO: 25.7 % (ref 37.5–51)
HGB BLD-MCNC: 7.5 G/DL (ref 13–17.7)
IMM GRANULOCYTES # BLD AUTO: 0.05 10*3/MM3 (ref 0–0.05)
IMM GRANULOCYTES NFR BLD AUTO: 0.8 % (ref 0–0.5)
INR PPP: 1.35 (ref 0.91–1.09)
IRON 24H UR-MRATE: 25 MCG/DL (ref 59–158)
IRON SATN MFR SERPL: 6 % (ref 20–50)
LYMPHOCYTES # BLD AUTO: 1.09 10*3/MM3 (ref 0.7–3.1)
LYMPHOCYTES NFR BLD AUTO: 18.1 % (ref 19.6–45.3)
MAGNESIUM SERPL-MCNC: 1.4 MG/DL (ref 1.6–2.4)
MAGNESIUM SERPL-MCNC: 1.8 MG/DL (ref 1.6–2.4)
MCH RBC QN AUTO: 28.3 PG (ref 26.6–33)
MCHC RBC AUTO-ENTMCNC: 29.2 G/DL (ref 31.5–35.7)
MCV RBC AUTO: 97 FL (ref 79–97)
MONOCYTES # BLD AUTO: 0.46 10*3/MM3 (ref 0.1–0.9)
MONOCYTES NFR BLD AUTO: 7.6 % (ref 5–12)
NEUTROPHILS NFR BLD AUTO: 4.31 10*3/MM3 (ref 1.7–7)
NEUTROPHILS NFR BLD AUTO: 71.5 % (ref 42.7–76)
NRBC BLD AUTO-RTO: 0 /100 WBC (ref 0–0.2)
NT-PROBNP SERPL-MCNC: 599.3 PG/ML (ref 0–1800)
PLATELET # BLD AUTO: 227 10*3/MM3 (ref 140–450)
PMV BLD AUTO: 9.3 FL (ref 6–12)
POTASSIUM SERPL-SCNC: 4.2 MMOL/L (ref 3.5–5.2)
PROTHROMBIN TIME: 16.9 SECONDS (ref 11.8–14.8)
RBC # BLD AUTO: 2.65 10*6/MM3 (ref 4.14–5.8)
RH BLD: POSITIVE
SODIUM SERPL-SCNC: 134 MMOL/L (ref 136–145)
T&S EXPIRATION DATE: NORMAL
TIBC SERPL-MCNC: 386 MCG/DL (ref 298–536)
TRANSFERRIN SERPL-MCNC: 259 MG/DL (ref 200–360)
TROPONIN T DELTA: 0 NG/L
TROPONIN T SERPL HS-MCNC: 18 NG/L
WBC NRBC COR # BLD: 6.03 10*3/MM3 (ref 3.4–10.8)

## 2023-07-22 PROCEDURE — 85025 COMPLETE CBC W/AUTO DIFF WBC: CPT | Performed by: EMERGENCY MEDICINE

## 2023-07-22 PROCEDURE — 94760 N-INVAS EAR/PLS OXIMETRY 1: CPT

## 2023-07-22 PROCEDURE — 93005 ELECTROCARDIOGRAM TRACING: CPT

## 2023-07-22 PROCEDURE — 71275 CT ANGIOGRAPHY CHEST: CPT

## 2023-07-22 PROCEDURE — 94640 AIRWAY INHALATION TREATMENT: CPT

## 2023-07-22 PROCEDURE — 85379 FIBRIN DEGRADATION QUANT: CPT | Performed by: EMERGENCY MEDICINE

## 2023-07-22 PROCEDURE — 83735 ASSAY OF MAGNESIUM: CPT | Performed by: INTERNAL MEDICINE

## 2023-07-22 PROCEDURE — 84466 ASSAY OF TRANSFERRIN: CPT | Performed by: INTERNAL MEDICINE

## 2023-07-22 PROCEDURE — 86900 BLOOD TYPING SEROLOGIC ABO: CPT | Performed by: INTERNAL MEDICINE

## 2023-07-22 PROCEDURE — 94799 UNLISTED PULMONARY SVC/PX: CPT

## 2023-07-22 PROCEDURE — 25010000002 MAGNESIUM SULFATE 2 GM/50ML SOLUTION: Performed by: EMERGENCY MEDICINE

## 2023-07-22 PROCEDURE — 36415 COLL VENOUS BLD VENIPUNCTURE: CPT

## 2023-07-22 PROCEDURE — 99285 EMERGENCY DEPT VISIT HI MDM: CPT

## 2023-07-22 PROCEDURE — 83735 ASSAY OF MAGNESIUM: CPT | Performed by: EMERGENCY MEDICINE

## 2023-07-22 PROCEDURE — 83540 ASSAY OF IRON: CPT | Performed by: INTERNAL MEDICINE

## 2023-07-22 PROCEDURE — 82728 ASSAY OF FERRITIN: CPT | Performed by: INTERNAL MEDICINE

## 2023-07-22 PROCEDURE — 86901 BLOOD TYPING SEROLOGIC RH(D): CPT | Performed by: INTERNAL MEDICINE

## 2023-07-22 PROCEDURE — 86850 RBC ANTIBODY SCREEN: CPT | Performed by: INTERNAL MEDICINE

## 2023-07-22 PROCEDURE — 85610 PROTHROMBIN TIME: CPT | Performed by: EMERGENCY MEDICINE

## 2023-07-22 PROCEDURE — 25010000002 FUROSEMIDE PER 20 MG: Performed by: EMERGENCY MEDICINE

## 2023-07-22 PROCEDURE — 71045 X-RAY EXAM CHEST 1 VIEW: CPT

## 2023-07-22 PROCEDURE — 25010000002 FUROSEMIDE PER 20 MG: Performed by: INTERNAL MEDICINE

## 2023-07-22 PROCEDURE — 93010 ELECTROCARDIOGRAM REPORT: CPT | Performed by: INTERNAL MEDICINE

## 2023-07-22 PROCEDURE — 83880 ASSAY OF NATRIURETIC PEPTIDE: CPT | Performed by: EMERGENCY MEDICINE

## 2023-07-22 PROCEDURE — 86923 COMPATIBILITY TEST ELECTRIC: CPT

## 2023-07-22 PROCEDURE — 84484 ASSAY OF TROPONIN QUANT: CPT | Performed by: EMERGENCY MEDICINE

## 2023-07-22 PROCEDURE — 25510000001 IOPAMIDOL PER 1 ML: Performed by: EMERGENCY MEDICINE

## 2023-07-22 PROCEDURE — 80048 BASIC METABOLIC PNL TOTAL CA: CPT | Performed by: EMERGENCY MEDICINE

## 2023-07-22 RX ORDER — FUROSEMIDE 10 MG/ML
80 INJECTION INTRAMUSCULAR; INTRAVENOUS ONCE
Status: COMPLETED | OUTPATIENT
Start: 2023-07-22 | End: 2023-07-22

## 2023-07-22 RX ORDER — AMOXICILLIN 250 MG
2 CAPSULE ORAL 2 TIMES DAILY
Status: DISCONTINUED | OUTPATIENT
Start: 2023-07-22 | End: 2023-07-25 | Stop reason: HOSPADM

## 2023-07-22 RX ORDER — ALLOPURINOL 300 MG/1
300 TABLET ORAL DAILY
Status: DISCONTINUED | OUTPATIENT
Start: 2023-07-22 | End: 2023-07-25 | Stop reason: HOSPADM

## 2023-07-22 RX ORDER — CHOLECALCIFEROL (VITAMIN D3) 125 MCG
2000 CAPSULE ORAL DAILY
Status: DISCONTINUED | OUTPATIENT
Start: 2023-07-22 | End: 2023-07-22

## 2023-07-22 RX ORDER — FERROUS SULFATE 325(65) MG
325 TABLET ORAL
Status: DISCONTINUED | OUTPATIENT
Start: 2023-07-23 | End: 2023-07-25 | Stop reason: HOSPADM

## 2023-07-22 RX ORDER — BISACODYL 5 MG/1
5 TABLET, DELAYED RELEASE ORAL DAILY PRN
Status: DISCONTINUED | OUTPATIENT
Start: 2023-07-22 | End: 2023-07-25 | Stop reason: HOSPADM

## 2023-07-22 RX ORDER — MAGNESIUM SULFATE HEPTAHYDRATE 40 MG/ML
2 INJECTION, SOLUTION INTRAVENOUS ONCE
Status: COMPLETED | OUTPATIENT
Start: 2023-07-22 | End: 2023-07-22

## 2023-07-22 RX ORDER — FUROSEMIDE 10 MG/ML
40 INJECTION INTRAMUSCULAR; INTRAVENOUS EVERY 12 HOURS
Status: DISCONTINUED | OUTPATIENT
Start: 2023-07-22 | End: 2023-07-24

## 2023-07-22 RX ORDER — POLYETHYLENE GLYCOL 3350 17 G/17G
17 POWDER, FOR SOLUTION ORAL DAILY PRN
Status: DISCONTINUED | OUTPATIENT
Start: 2023-07-22 | End: 2023-07-25 | Stop reason: HOSPADM

## 2023-07-22 RX ORDER — SODIUM CHLORIDE 9 MG/ML
40 INJECTION, SOLUTION INTRAVENOUS AS NEEDED
Status: DISCONTINUED | OUTPATIENT
Start: 2023-07-22 | End: 2023-07-25 | Stop reason: HOSPADM

## 2023-07-22 RX ORDER — SODIUM CHLORIDE 0.9 % (FLUSH) 0.9 %
10 SYRINGE (ML) INJECTION EVERY 12 HOURS SCHEDULED
Status: DISCONTINUED | OUTPATIENT
Start: 2023-07-22 | End: 2023-07-25 | Stop reason: HOSPADM

## 2023-07-22 RX ORDER — ATORVASTATIN CALCIUM 10 MG/1
20 TABLET, FILM COATED ORAL DAILY
Status: DISCONTINUED | OUTPATIENT
Start: 2023-07-22 | End: 2023-07-25 | Stop reason: HOSPADM

## 2023-07-22 RX ORDER — SODIUM CHLORIDE 0.9 % (FLUSH) 0.9 %
10 SYRINGE (ML) INJECTION AS NEEDED
Status: DISCONTINUED | OUTPATIENT
Start: 2023-07-22 | End: 2023-07-25 | Stop reason: HOSPADM

## 2023-07-22 RX ORDER — IPRATROPIUM BROMIDE AND ALBUTEROL SULFATE 2.5; .5 MG/3ML; MG/3ML
3 SOLUTION RESPIRATORY (INHALATION) ONCE
Status: COMPLETED | OUTPATIENT
Start: 2023-07-22 | End: 2023-07-22

## 2023-07-22 RX ORDER — LISINOPRIL 20 MG/1
20 TABLET ORAL DAILY
Status: DISCONTINUED | OUTPATIENT
Start: 2023-07-22 | End: 2023-07-23

## 2023-07-22 RX ORDER — BISACODYL 10 MG
10 SUPPOSITORY, RECTAL RECTAL DAILY PRN
Status: DISCONTINUED | OUTPATIENT
Start: 2023-07-22 | End: 2023-07-25 | Stop reason: HOSPADM

## 2023-07-22 RX ORDER — SPIRONOLACTONE 25 MG/1
25 TABLET ORAL DAILY
Status: DISCONTINUED | OUTPATIENT
Start: 2023-07-22 | End: 2023-07-25 | Stop reason: HOSPADM

## 2023-07-22 RX ORDER — PANTOPRAZOLE SODIUM 40 MG/1
40 TABLET, DELAYED RELEASE ORAL DAILY
Status: DISCONTINUED | OUTPATIENT
Start: 2023-07-22 | End: 2023-07-25 | Stop reason: HOSPADM

## 2023-07-22 RX ORDER — METOPROLOL SUCCINATE 50 MG/1
50 TABLET, EXTENDED RELEASE ORAL
Status: DISCONTINUED | OUTPATIENT
Start: 2023-07-22 | End: 2023-07-25 | Stop reason: HOSPADM

## 2023-07-22 RX ORDER — TAMSULOSIN HYDROCHLORIDE 0.4 MG/1
0.4 CAPSULE ORAL DAILY
Status: DISCONTINUED | OUTPATIENT
Start: 2023-07-22 | End: 2023-07-25 | Stop reason: HOSPADM

## 2023-07-22 RX ADMIN — IPRATROPIUM BROMIDE AND ALBUTEROL SULFATE 3 ML: .5; 3 SOLUTION RESPIRATORY (INHALATION) at 12:43

## 2023-07-22 RX ADMIN — ATORVASTATIN CALCIUM 20 MG: 10 TABLET, FILM COATED ORAL at 22:03

## 2023-07-22 RX ADMIN — LISINOPRIL 20 MG: 20 TABLET ORAL at 22:03

## 2023-07-22 RX ADMIN — FUROSEMIDE 40 MG: 10 INJECTION, SOLUTION INTRAVENOUS at 21:55

## 2023-07-22 RX ADMIN — MAGNESIUM SULFATE HEPTAHYDRATE 2 G: 2 INJECTION, SOLUTION INTRAVENOUS at 13:43

## 2023-07-22 RX ADMIN — DILTIAZEM HYDROCHLORIDE 30 MG: 30 TABLET, FILM COATED ORAL at 22:04

## 2023-07-22 RX ADMIN — IOPAMIDOL 100 ML: 755 INJECTION, SOLUTION INTRAVENOUS at 15:19

## 2023-07-22 RX ADMIN — Medication 10 ML: at 21:56

## 2023-07-22 RX ADMIN — TAMSULOSIN HYDROCHLORIDE 0.4 MG: 0.4 CAPSULE ORAL at 22:03

## 2023-07-22 RX ADMIN — DILTIAZEM HYDROCHLORIDE 5 MG/HR: 5 INJECTION INTRAVENOUS at 12:23

## 2023-07-22 RX ADMIN — ALLOPURINOL 300 MG: 300 TABLET ORAL at 22:04

## 2023-07-22 RX ADMIN — DILTIAZEM HYDROCHLORIDE 30 MG: 30 TABLET, FILM COATED ORAL at 21:55

## 2023-07-22 RX ADMIN — IPRATROPIUM BROMIDE 0.5 MG: 0.5 SOLUTION RESPIRATORY (INHALATION) at 20:45

## 2023-07-22 RX ADMIN — RIVAROXABAN 20 MG: 20 TABLET, FILM COATED ORAL at 22:03

## 2023-07-22 RX ADMIN — METOPROLOL SUCCINATE 50 MG: 50 TABLET, EXTENDED RELEASE ORAL at 22:02

## 2023-07-22 RX ADMIN — FUROSEMIDE 80 MG: 10 INJECTION, SOLUTION INTRAVENOUS at 12:23

## 2023-07-23 LAB
ANION GAP SERPL CALCULATED.3IONS-SCNC: 10 MMOL/L (ref 5–15)
BASOPHILS # BLD AUTO: 0.01 10*3/MM3 (ref 0–0.2)
BASOPHILS NFR BLD AUTO: 0.2 % (ref 0–1.5)
BUN SERPL-MCNC: 8 MG/DL (ref 8–23)
BUN/CREAT SERPL: 6.8 (ref 7–25)
CALCIUM SPEC-SCNC: 8.5 MG/DL (ref 8.6–10.5)
CHLORIDE SERPL-SCNC: 98 MMOL/L (ref 98–107)
CO2 SERPL-SCNC: 27 MMOL/L (ref 22–29)
CREAT SERPL-MCNC: 1.18 MG/DL (ref 0.76–1.27)
DEPRECATED RDW RBC AUTO: 66 FL (ref 37–54)
EGFRCR SERPLBLD CKD-EPI 2021: 63.6 ML/MIN/1.73
EOSINOPHIL # BLD AUTO: 0.12 10*3/MM3 (ref 0–0.4)
EOSINOPHIL NFR BLD AUTO: 2.2 % (ref 0.3–6.2)
ERYTHROCYTE [DISTWIDTH] IN BLOOD BY AUTOMATED COUNT: 20 % (ref 12.3–15.4)
GLUCOSE SERPL-MCNC: 103 MG/DL (ref 65–99)
HCT VFR BLD AUTO: 21.4 % (ref 37.5–51)
HCT VFR BLD AUTO: 26.7 % (ref 37.5–51)
HEMOCCULT STL QL: NEGATIVE
HGB BLD-MCNC: 6.4 G/DL (ref 13–17.7)
HGB BLD-MCNC: 7.9 G/DL (ref 13–17.7)
IMM GRANULOCYTES # BLD AUTO: 0.03 10*3/MM3 (ref 0–0.05)
IMM GRANULOCYTES NFR BLD AUTO: 0.5 % (ref 0–0.5)
LYMPHOCYTES # BLD AUTO: 0.98 10*3/MM3 (ref 0.7–3.1)
LYMPHOCYTES NFR BLD AUTO: 17.9 % (ref 19.6–45.3)
MAGNESIUM SERPL-MCNC: 1.8 MG/DL (ref 1.6–2.4)
MCH RBC QN AUTO: 29.1 PG (ref 26.6–33)
MCHC RBC AUTO-ENTMCNC: 29.9 G/DL (ref 31.5–35.7)
MCV RBC AUTO: 97.3 FL (ref 79–97)
MONOCYTES # BLD AUTO: 0.47 10*3/MM3 (ref 0.1–0.9)
MONOCYTES NFR BLD AUTO: 8.6 % (ref 5–12)
NEUTROPHILS NFR BLD AUTO: 3.86 10*3/MM3 (ref 1.7–7)
NEUTROPHILS NFR BLD AUTO: 70.6 % (ref 42.7–76)
NRBC BLD AUTO-RTO: 0.4 /100 WBC (ref 0–0.2)
PLATELET # BLD AUTO: 202 10*3/MM3 (ref 140–450)
PMV BLD AUTO: 9.3 FL (ref 6–12)
POTASSIUM SERPL-SCNC: 4.1 MMOL/L (ref 3.5–5.2)
RBC # BLD AUTO: 2.2 10*6/MM3 (ref 4.14–5.8)
SODIUM SERPL-SCNC: 135 MMOL/L (ref 136–145)
WBC NRBC COR # BLD: 5.47 10*3/MM3 (ref 3.4–10.8)

## 2023-07-23 PROCEDURE — 36430 TRANSFUSION BLD/BLD COMPNT: CPT

## 2023-07-23 PROCEDURE — 25010000002 NA FERRIC GLUC CPLX PER 12.5 MG: Performed by: INTERNAL MEDICINE

## 2023-07-23 PROCEDURE — 85018 HEMOGLOBIN: CPT | Performed by: INTERNAL MEDICINE

## 2023-07-23 PROCEDURE — 85025 COMPLETE CBC W/AUTO DIFF WBC: CPT | Performed by: INTERNAL MEDICINE

## 2023-07-23 PROCEDURE — 80048 BASIC METABOLIC PNL TOTAL CA: CPT | Performed by: INTERNAL MEDICINE

## 2023-07-23 PROCEDURE — 86900 BLOOD TYPING SEROLOGIC ABO: CPT

## 2023-07-23 PROCEDURE — 94664 DEMO&/EVAL PT USE INHALER: CPT

## 2023-07-23 PROCEDURE — 85014 HEMATOCRIT: CPT | Performed by: INTERNAL MEDICINE

## 2023-07-23 PROCEDURE — 83735 ASSAY OF MAGNESIUM: CPT | Performed by: INTERNAL MEDICINE

## 2023-07-23 PROCEDURE — 94799 UNLISTED PULMONARY SVC/PX: CPT

## 2023-07-23 PROCEDURE — 25010000002 FUROSEMIDE PER 20 MG: Performed by: INTERNAL MEDICINE

## 2023-07-23 PROCEDURE — 82272 OCCULT BLD FECES 1-3 TESTS: CPT | Performed by: INTERNAL MEDICINE

## 2023-07-23 PROCEDURE — P9016 RBC LEUKOCYTES REDUCED: HCPCS

## 2023-07-23 RX ORDER — MULTIPLE VITAMINS W/ MINERALS TAB 9MG-400MCG
1 TAB ORAL DAILY
COMMUNITY

## 2023-07-23 RX ORDER — ALLOPURINOL 300 MG/1
300 TABLET ORAL DAILY
COMMUNITY

## 2023-07-23 RX ORDER — METOPROLOL SUCCINATE 50 MG/1
50 TABLET, EXTENDED RELEASE ORAL DAILY
COMMUNITY
End: 2023-08-03 | Stop reason: SDUPTHER

## 2023-07-23 RX ORDER — ATORVASTATIN CALCIUM 40 MG/1
40 TABLET, FILM COATED ORAL NIGHTLY
COMMUNITY

## 2023-07-23 RX ORDER — LISINOPRIL 10 MG/1
10 TABLET ORAL EVERY 12 HOURS SCHEDULED
Status: DISCONTINUED | OUTPATIENT
Start: 2023-07-23 | End: 2023-07-24

## 2023-07-23 RX ADMIN — IPRATROPIUM BROMIDE 0.5 MG: 0.5 SOLUTION RESPIRATORY (INHALATION) at 20:18

## 2023-07-23 RX ADMIN — FERROUS SULFATE TAB 325 MG (65 MG ELEMENTAL FE) 325 MG: 325 (65 FE) TAB at 08:45

## 2023-07-23 RX ADMIN — LISINOPRIL 10 MG: 10 TABLET ORAL at 22:00

## 2023-07-23 RX ADMIN — SODIUM CHLORIDE 250 MG: 9 INJECTION, SOLUTION INTRAVENOUS at 12:49

## 2023-07-23 RX ADMIN — FUROSEMIDE 40 MG: 10 INJECTION, SOLUTION INTRAVENOUS at 22:00

## 2023-07-23 RX ADMIN — Medication 10 ML: at 08:46

## 2023-07-23 RX ADMIN — ALLOPURINOL 300 MG: 300 TABLET ORAL at 08:45

## 2023-07-23 RX ADMIN — TAMSULOSIN HYDROCHLORIDE 0.4 MG: 0.4 CAPSULE ORAL at 08:45

## 2023-07-23 RX ADMIN — IPRATROPIUM BROMIDE 0.5 MG: 0.5 SOLUTION RESPIRATORY (INHALATION) at 06:28

## 2023-07-23 RX ADMIN — ATORVASTATIN CALCIUM 20 MG: 10 TABLET, FILM COATED ORAL at 08:45

## 2023-07-23 RX ADMIN — DILTIAZEM HYDROCHLORIDE 30 MG: 30 TABLET, FILM COATED ORAL at 05:02

## 2023-07-23 RX ADMIN — IPRATROPIUM BROMIDE 0.5 MG: 0.5 SOLUTION RESPIRATORY (INHALATION) at 14:56

## 2023-07-23 RX ADMIN — DILTIAZEM HYDROCHLORIDE 30 MG: 30 TABLET, FILM COATED ORAL at 17:03

## 2023-07-23 RX ADMIN — FUROSEMIDE 40 MG: 10 INJECTION, SOLUTION INTRAVENOUS at 10:45

## 2023-07-23 RX ADMIN — PANTOPRAZOLE SODIUM 40 MG: 40 TABLET, DELAYED RELEASE ORAL at 08:45

## 2023-07-23 RX ADMIN — IPRATROPIUM BROMIDE 0.5 MG: 0.5 SOLUTION RESPIRATORY (INHALATION) at 10:30

## 2023-07-24 ENCOUNTER — APPOINTMENT (OUTPATIENT)
Dept: CARDIOLOGY | Facility: HOSPITAL | Age: 78
DRG: 309 | End: 2023-07-24
Payer: MEDICARE

## 2023-07-24 LAB
ANION GAP SERPL CALCULATED.3IONS-SCNC: 12 MMOL/L (ref 5–15)
BH BB BLOOD EXPIRATION DATE: NORMAL
BH BB BLOOD TYPE BARCODE: 7300
BH BB DISPENSE STATUS: NORMAL
BH BB PRODUCT CODE: NORMAL
BH BB UNIT NUMBER: NORMAL
BH CV ECHO MEAS - AO MAX PG: 13.5 MMHG
BH CV ECHO MEAS - AO MEAN PG: 8 MMHG
BH CV ECHO MEAS - AO ROOT DIAM: 3.4 CM
BH CV ECHO MEAS - AO V2 MAX: 184 CM/SEC
BH CV ECHO MEAS - AO V2 VTI: 41.1 CM
BH CV ECHO MEAS - AVA(I,D): 1.64 CM2
BH CV ECHO MEAS - EDV(CUBED): 148.9 ML
BH CV ECHO MEAS - EDV(MOD-SP2): 141 ML
BH CV ECHO MEAS - EDV(MOD-SP4): 125 ML
BH CV ECHO MEAS - EF(MOD-SP2): 77.3 %
BH CV ECHO MEAS - EF(MOD-SP4): 80 %
BH CV ECHO MEAS - ESV(CUBED): 42.9 ML
BH CV ECHO MEAS - ESV(MOD-SP2): 32 ML
BH CV ECHO MEAS - ESV(MOD-SP4): 25 ML
BH CV ECHO MEAS - FS: 34 %
BH CV ECHO MEAS - IVS/LVPW: 1 CM
BH CV ECHO MEAS - IVSD: 1 CM
BH CV ECHO MEAS - LA DIMENSION: 5.6 CM
BH CV ECHO MEAS - LV DIASTOLIC VOL/BSA (35-75): 56.6 CM2
BH CV ECHO MEAS - LV MASS(C)D: 200.4 GRAMS
BH CV ECHO MEAS - LV MAX PG: 3.5 MMHG
BH CV ECHO MEAS - LV MEAN PG: 2 MMHG
BH CV ECHO MEAS - LV SYSTOLIC VOL/BSA (12-30): 11.3 CM2
BH CV ECHO MEAS - LV V1 MAX: 93.1 CM/SEC
BH CV ECHO MEAS - LV V1 VTI: 17.7 CM
BH CV ECHO MEAS - LVIDD: 5.3 CM
BH CV ECHO MEAS - LVIDS: 3.5 CM
BH CV ECHO MEAS - LVOT AREA: 3.8 CM2
BH CV ECHO MEAS - LVOT DIAM: 2.2 CM
BH CV ECHO MEAS - LVPWD: 1 CM
BH CV ECHO MEAS - MV DEC TIME: 0.16 MSEC
BH CV ECHO MEAS - MV E MAX VEL: 155 CM/SEC
BH CV ECHO MEAS - MV MAX PG: 9.9 MMHG
BH CV ECHO MEAS - MV MEAN PG: 5 MMHG
BH CV ECHO MEAS - MV V2 VTI: 46.7 CM
BH CV ECHO MEAS - MVA(VTI): 1.44 CM2
BH CV ECHO MEAS - PA V2 MAX: 114 CM/SEC
BH CV ECHO MEAS - PI END-D VEL: 141.5 CM/SEC
BH CV ECHO MEAS - RAP SYSTOLE: 5 MMHG
BH CV ECHO MEAS - RV MAX PG: 3 MMHG
BH CV ECHO MEAS - RV V1 MAX: 86.5 CM/SEC
BH CV ECHO MEAS - RV V1 VTI: 20.2 CM
BH CV ECHO MEAS - RVDD: 4.5 CM
BH CV ECHO MEAS - RVSP: 42.7 MMHG
BH CV ECHO MEAS - SI(MOD-SP2): 49.4 ML/M2
BH CV ECHO MEAS - SI(MOD-SP4): 45.3 ML/M2
BH CV ECHO MEAS - SV(LVOT): 67.3 ML
BH CV ECHO MEAS - SV(MOD-SP2): 109 ML
BH CV ECHO MEAS - SV(MOD-SP4): 100 ML
BH CV ECHO MEAS - TR MAX PG: 37.7 MMHG
BH CV ECHO MEAS - TR MAX VEL: 307 CM/SEC
BUN SERPL-MCNC: 13 MG/DL (ref 8–23)
BUN/CREAT SERPL: 8.1 (ref 7–25)
CALCIUM SPEC-SCNC: 8.9 MG/DL (ref 8.6–10.5)
CHLORIDE SERPL-SCNC: 97 MMOL/L (ref 98–107)
CO2 SERPL-SCNC: 25 MMOL/L (ref 22–29)
CREAT SERPL-MCNC: 1.6 MG/DL (ref 0.76–1.27)
CROSSMATCH INTERPRETATION: NORMAL
DEPRECATED RDW RBC AUTO: 66.5 FL (ref 37–54)
EGFRCR SERPLBLD CKD-EPI 2021: 44.1 ML/MIN/1.73
ERYTHROCYTE [DISTWIDTH] IN BLOOD BY AUTOMATED COUNT: 21.2 % (ref 12.3–15.4)
GLUCOSE SERPL-MCNC: 87 MG/DL (ref 65–99)
HCT VFR BLD AUTO: 24.6 % (ref 37.5–51)
HGB BLD-MCNC: 7.4 G/DL (ref 13–17.7)
LEFT ATRIUM VOLUME INDEX: 53.8 ML/M2
LEFT ATRIUM VOLUME: 122 ML
MCH RBC QN AUTO: 28.8 PG (ref 26.6–33)
MCHC RBC AUTO-ENTMCNC: 30.1 G/DL (ref 31.5–35.7)
MCV RBC AUTO: 95.7 FL (ref 79–97)
PLATELET # BLD AUTO: 172 10*3/MM3 (ref 140–450)
PMV BLD AUTO: 9.4 FL (ref 6–12)
POTASSIUM SERPL-SCNC: 3.9 MMOL/L (ref 3.5–5.2)
RBC # BLD AUTO: 2.57 10*6/MM3 (ref 4.14–5.8)
SODIUM SERPL-SCNC: 134 MMOL/L (ref 136–145)
UNIT  ABO: NORMAL
UNIT  RH: NORMAL
WBC NRBC COR # BLD: 6.02 10*3/MM3 (ref 3.4–10.8)

## 2023-07-24 PROCEDURE — 93306 TTE W/DOPPLER COMPLETE: CPT | Performed by: INTERNAL MEDICINE

## 2023-07-24 PROCEDURE — 25010000002 FUROSEMIDE PER 20 MG: Performed by: INTERNAL MEDICINE

## 2023-07-24 PROCEDURE — 94799 UNLISTED PULMONARY SVC/PX: CPT

## 2023-07-24 PROCEDURE — 25510000001 PERFLUTREN 6.52 MG/ML SUSPENSION: Performed by: INTERNAL MEDICINE

## 2023-07-24 PROCEDURE — 94664 DEMO&/EVAL PT USE INHALER: CPT

## 2023-07-24 PROCEDURE — 94760 N-INVAS EAR/PLS OXIMETRY 1: CPT

## 2023-07-24 PROCEDURE — 85027 COMPLETE CBC AUTOMATED: CPT | Performed by: INTERNAL MEDICINE

## 2023-07-24 PROCEDURE — 80048 BASIC METABOLIC PNL TOTAL CA: CPT | Performed by: INTERNAL MEDICINE

## 2023-07-24 PROCEDURE — 25010000002 NA FERRIC GLUC CPLX PER 12.5 MG: Performed by: INTERNAL MEDICINE

## 2023-07-24 PROCEDURE — 93306 TTE W/DOPPLER COMPLETE: CPT

## 2023-07-24 RX ADMIN — FUROSEMIDE 40 MG: 10 INJECTION, SOLUTION INTRAVENOUS at 08:09

## 2023-07-24 RX ADMIN — IPRATROPIUM BROMIDE 0.5 MG: 0.5 SOLUTION RESPIRATORY (INHALATION) at 06:22

## 2023-07-24 RX ADMIN — IPRATROPIUM BROMIDE 0.5 MG: 0.5 SOLUTION RESPIRATORY (INHALATION) at 18:30

## 2023-07-24 RX ADMIN — DILTIAZEM HYDROCHLORIDE 30 MG: 30 TABLET, FILM COATED ORAL at 00:25

## 2023-07-24 RX ADMIN — Medication 10 ML: at 20:40

## 2023-07-24 RX ADMIN — DILTIAZEM HYDROCHLORIDE 30 MG: 30 TABLET, FILM COATED ORAL at 12:58

## 2023-07-24 RX ADMIN — ALLOPURINOL 300 MG: 300 TABLET ORAL at 08:09

## 2023-07-24 RX ADMIN — FERROUS SULFATE TAB 325 MG (65 MG ELEMENTAL FE) 325 MG: 325 (65 FE) TAB at 08:10

## 2023-07-24 RX ADMIN — IPRATROPIUM BROMIDE 0.5 MG: 0.5 SOLUTION RESPIRATORY (INHALATION) at 10:23

## 2023-07-24 RX ADMIN — IPRATROPIUM BROMIDE 0.5 MG: 0.5 SOLUTION RESPIRATORY (INHALATION) at 15:06

## 2023-07-24 RX ADMIN — SODIUM CHLORIDE 250 MG: 9 INJECTION, SOLUTION INTRAVENOUS at 08:10

## 2023-07-24 RX ADMIN — Medication 10 ML: at 08:10

## 2023-07-24 RX ADMIN — METOPROLOL SUCCINATE 50 MG: 50 TABLET, EXTENDED RELEASE ORAL at 08:10

## 2023-07-24 RX ADMIN — DILTIAZEM HYDROCHLORIDE 30 MG: 30 TABLET, FILM COATED ORAL at 06:42

## 2023-07-24 RX ADMIN — PERFLUTREN 13.04 MG: 6.52 INJECTION, SUSPENSION INTRAVENOUS at 12:33

## 2023-07-24 RX ADMIN — ATORVASTATIN CALCIUM 20 MG: 10 TABLET, FILM COATED ORAL at 08:10

## 2023-07-24 RX ADMIN — TAMSULOSIN HYDROCHLORIDE 0.4 MG: 0.4 CAPSULE ORAL at 08:09

## 2023-07-24 RX ADMIN — PANTOPRAZOLE SODIUM 40 MG: 40 TABLET, DELAYED RELEASE ORAL at 08:12

## 2023-07-25 ENCOUNTER — READMISSION MANAGEMENT (OUTPATIENT)
Dept: CALL CENTER | Facility: HOSPITAL | Age: 78
End: 2023-07-25
Payer: OTHER GOVERNMENT

## 2023-07-25 VITALS
TEMPERATURE: 97.9 F | WEIGHT: 243.7 LBS | BODY MASS INDEX: 38.25 KG/M2 | HEIGHT: 67 IN | SYSTOLIC BLOOD PRESSURE: 125 MMHG | HEART RATE: 85 BPM | RESPIRATION RATE: 16 BRPM | DIASTOLIC BLOOD PRESSURE: 63 MMHG | OXYGEN SATURATION: 98 %

## 2023-07-25 LAB
ALBUMIN SERPL-MCNC: 3.4 G/DL (ref 3.5–5.2)
ALBUMIN/GLOB SERPL: 1.9 G/DL
ALP SERPL-CCNC: 97 U/L (ref 39–117)
ALT SERPL W P-5'-P-CCNC: 7 U/L (ref 1–41)
ANION GAP SERPL CALCULATED.3IONS-SCNC: 10 MMOL/L (ref 5–15)
AST SERPL-CCNC: 16 U/L (ref 1–40)
BILIRUB SERPL-MCNC: 0.6 MG/DL (ref 0–1.2)
BUN SERPL-MCNC: 14 MG/DL (ref 8–23)
BUN/CREAT SERPL: 9.9 (ref 7–25)
CALCIUM SPEC-SCNC: 8.8 MG/DL (ref 8.6–10.5)
CHLORIDE SERPL-SCNC: 100 MMOL/L (ref 98–107)
CO2 SERPL-SCNC: 26 MMOL/L (ref 22–29)
CREAT SERPL-MCNC: 1.41 MG/DL (ref 0.76–1.27)
EGFRCR SERPLBLD CKD-EPI 2021: 51.3 ML/MIN/1.73
GLOBULIN UR ELPH-MCNC: 1.8 GM/DL
GLUCOSE SERPL-MCNC: 82 MG/DL (ref 65–99)
HCT VFR BLD AUTO: 24.7 % (ref 37.5–51)
HGB BLD-MCNC: 7.3 G/DL (ref 13–17.7)
POTASSIUM SERPL-SCNC: 4 MMOL/L (ref 3.5–5.2)
PROT SERPL-MCNC: 5.2 G/DL (ref 6–8.5)
QT INTERVAL: 314 MS
QTC INTERVAL: 451 MS
SODIUM SERPL-SCNC: 136 MMOL/L (ref 136–145)

## 2023-07-25 PROCEDURE — 85018 HEMOGLOBIN: CPT | Performed by: INTERNAL MEDICINE

## 2023-07-25 PROCEDURE — 94799 UNLISTED PULMONARY SVC/PX: CPT

## 2023-07-25 PROCEDURE — 80053 COMPREHEN METABOLIC PANEL: CPT | Performed by: INTERNAL MEDICINE

## 2023-07-25 PROCEDURE — 85014 HEMATOCRIT: CPT | Performed by: INTERNAL MEDICINE

## 2023-07-25 RX ORDER — FUROSEMIDE 20 MG/1
20 TABLET ORAL DAILY PRN
Start: 2023-07-25

## 2023-07-25 RX ADMIN — ATORVASTATIN CALCIUM 20 MG: 10 TABLET, FILM COATED ORAL at 09:06

## 2023-07-25 RX ADMIN — Medication 10 ML: at 09:07

## 2023-07-25 RX ADMIN — PANTOPRAZOLE SODIUM 40 MG: 40 TABLET, DELAYED RELEASE ORAL at 09:06

## 2023-07-25 RX ADMIN — TAMSULOSIN HYDROCHLORIDE 0.4 MG: 0.4 CAPSULE ORAL at 09:06

## 2023-07-25 RX ADMIN — IPRATROPIUM BROMIDE 0.5 MG: 0.5 SOLUTION RESPIRATORY (INHALATION) at 06:21

## 2023-07-25 RX ADMIN — METOPROLOL SUCCINATE 50 MG: 50 TABLET, EXTENDED RELEASE ORAL at 09:06

## 2023-07-25 RX ADMIN — FERROUS SULFATE TAB 325 MG (65 MG ELEMENTAL FE) 325 MG: 325 (65 FE) TAB at 10:22

## 2023-07-25 RX ADMIN — IPRATROPIUM BROMIDE 0.5 MG: 0.5 SOLUTION RESPIRATORY (INHALATION) at 10:12

## 2023-07-25 RX ADMIN — ALLOPURINOL 300 MG: 300 TABLET ORAL at 09:06

## 2023-07-25 RX ADMIN — SPIRONOLACTONE 25 MG: 25 TABLET ORAL at 09:07

## 2023-07-26 ENCOUNTER — HOSPITAL ENCOUNTER (EMERGENCY)
Facility: HOSPITAL | Age: 78
Discharge: HOME | End: 2023-07-26
Attending: FAMILY MEDICINE | Admitting: FAMILY MEDICINE
Payer: MEDICARE

## 2023-07-26 ENCOUNTER — TRANSITIONAL CARE MANAGEMENT TELEPHONE ENCOUNTER (OUTPATIENT)
Dept: CALL CENTER | Facility: HOSPITAL | Age: 78
End: 2023-07-26
Payer: OTHER GOVERNMENT

## 2023-07-26 ENCOUNTER — APPOINTMENT (OUTPATIENT)
Dept: CT IMAGING | Facility: HOSPITAL | Age: 78
End: 2023-07-26
Payer: MEDICARE

## 2023-07-26 ENCOUNTER — TELEPHONE (OUTPATIENT)
Dept: GASTROENTEROLOGY | Facility: CLINIC | Age: 78
End: 2023-07-26
Payer: OTHER GOVERNMENT

## 2023-07-26 ENCOUNTER — APPOINTMENT (OUTPATIENT)
Dept: GENERAL RADIOLOGY | Facility: HOSPITAL | Age: 78
End: 2023-07-26
Payer: MEDICARE

## 2023-07-26 VITALS
RESPIRATION RATE: 20 BRPM | WEIGHT: 244 LBS | SYSTOLIC BLOOD PRESSURE: 130 MMHG | HEART RATE: 84 BPM | TEMPERATURE: 97.6 F | BODY MASS INDEX: 38.3 KG/M2 | OXYGEN SATURATION: 96 % | HEIGHT: 67 IN | DIASTOLIC BLOOD PRESSURE: 88 MMHG

## 2023-07-26 DIAGNOSIS — R06.02 SHORTNESS OF BREATH: Primary | ICD-10-CM

## 2023-07-26 LAB
ANION GAP SERPL CALCULATED.3IONS-SCNC: 11 MMOL/L (ref 5–15)
ARTERIAL PATENCY WRIST A: ABNORMAL
ATMOSPHERIC PRESS: 749 MMHG
BASE EXCESS BLDA CALC-SCNC: 3.1 MMOL/L (ref 0–2)
BASOPHILS # BLD AUTO: 0.03 10*3/MM3 (ref 0–0.2)
BASOPHILS NFR BLD AUTO: 0.4 % (ref 0–1.5)
BDY SITE: ABNORMAL
BODY TEMPERATURE: 37 C
BUN SERPL-MCNC: 11 MG/DL (ref 8–23)
BUN/CREAT SERPL: 9.9 (ref 7–25)
CALCIUM SPEC-SCNC: 9.4 MG/DL (ref 8.6–10.5)
CHLORIDE SERPL-SCNC: 101 MMOL/L (ref 98–107)
CO2 SERPL-SCNC: 26 MMOL/L (ref 22–29)
CREAT SERPL-MCNC: 1.11 MG/DL (ref 0.76–1.27)
D DIMER PPP FEU-MCNC: 1.95 MCGFEU/ML (ref 0–0.77)
D-LACTATE SERPL-SCNC: 1.1 MMOL/L (ref 0.5–2)
DEPRECATED RDW RBC AUTO: 72.6 FL (ref 37–54)
EGFRCR SERPLBLD CKD-EPI 2021: 68.4 ML/MIN/1.73
EOSINOPHIL # BLD AUTO: 0.17 10*3/MM3 (ref 0–0.4)
EOSINOPHIL NFR BLD AUTO: 2.5 % (ref 0.3–6.2)
ERYTHROCYTE [DISTWIDTH] IN BLOOD BY AUTOMATED COUNT: 21.4 % (ref 12.3–15.4)
GLUCOSE SERPL-MCNC: 97 MG/DL (ref 65–99)
HCO3 BLDA-SCNC: 27.5 MMOL/L (ref 20–26)
HCT VFR BLD AUTO: 29.3 % (ref 37.5–51)
HGB BLD-MCNC: 8.6 G/DL (ref 13–17.7)
HOLD SPECIMEN: NORMAL
IMM GRANULOCYTES # BLD AUTO: 0.05 10*3/MM3 (ref 0–0.05)
IMM GRANULOCYTES NFR BLD AUTO: 0.7 % (ref 0–0.5)
LYMPHOCYTES # BLD AUTO: 1 10*3/MM3 (ref 0.7–3.1)
LYMPHOCYTES NFR BLD AUTO: 14.7 % (ref 19.6–45.3)
Lab: ABNORMAL
MAGNESIUM SERPL-MCNC: 1.7 MG/DL (ref 1.6–2.4)
MCH RBC QN AUTO: 28.8 PG (ref 26.6–33)
MCHC RBC AUTO-ENTMCNC: 29.4 G/DL (ref 31.5–35.7)
MCV RBC AUTO: 98 FL (ref 79–97)
MODALITY: ABNORMAL
MONOCYTES # BLD AUTO: 0.51 10*3/MM3 (ref 0.1–0.9)
MONOCYTES NFR BLD AUTO: 7.5 % (ref 5–12)
NEUTROPHILS NFR BLD AUTO: 5.04 10*3/MM3 (ref 1.7–7)
NEUTROPHILS NFR BLD AUTO: 74.2 % (ref 42.7–76)
NRBC BLD AUTO-RTO: 0.3 /100 WBC (ref 0–0.2)
NT-PROBNP SERPL-MCNC: 2004 PG/ML (ref 0–1800)
PCO2 BLDA: 40.3 MM HG (ref 35–45)
PCO2 TEMP ADJ BLD: 40.3 MM HG (ref 35–45)
PH BLDA: 7.44 PH UNITS (ref 7.35–7.45)
PH, TEMP CORRECTED: 7.44 PH UNITS (ref 7.35–7.45)
PLATELET # BLD AUTO: 184 10*3/MM3 (ref 140–450)
PMV BLD AUTO: 9.6 FL (ref 6–12)
PO2 BLDA: 65 MM HG (ref 83–108)
PO2 TEMP ADJ BLD: 65 MM HG (ref 83–108)
POTASSIUM SERPL-SCNC: 4.3 MMOL/L (ref 3.5–5.2)
RBC # BLD AUTO: 2.99 10*6/MM3 (ref 4.14–5.8)
SAO2 % BLDCOA: 94 % (ref 94–99)
SODIUM SERPL-SCNC: 138 MMOL/L (ref 136–145)
TROPONIN T SERPL HS-MCNC: 21 NG/L
VENTILATOR MODE: ABNORMAL
WBC NRBC COR # BLD: 6.8 10*3/MM3 (ref 3.4–10.8)
WHOLE BLOOD HOLD COAG: NORMAL
WHOLE BLOOD HOLD SPECIMEN: NORMAL

## 2023-07-26 PROCEDURE — 71045 X-RAY EXAM CHEST 1 VIEW: CPT

## 2023-07-26 PROCEDURE — 36600 WITHDRAWAL OF ARTERIAL BLOOD: CPT

## 2023-07-26 PROCEDURE — 94799 UNLISTED PULMONARY SVC/PX: CPT

## 2023-07-26 PROCEDURE — 84484 ASSAY OF TROPONIN QUANT: CPT | Performed by: FAMILY MEDICINE

## 2023-07-26 PROCEDURE — 83735 ASSAY OF MAGNESIUM: CPT | Performed by: FAMILY MEDICINE

## 2023-07-26 PROCEDURE — 96374 THER/PROPH/DIAG INJ IV PUSH: CPT

## 2023-07-26 PROCEDURE — 80048 BASIC METABOLIC PNL TOTAL CA: CPT | Performed by: FAMILY MEDICINE

## 2023-07-26 PROCEDURE — 94640 AIRWAY INHALATION TREATMENT: CPT

## 2023-07-26 PROCEDURE — 83605 ASSAY OF LACTIC ACID: CPT | Performed by: FAMILY MEDICINE

## 2023-07-26 PROCEDURE — 94761 N-INVAS EAR/PLS OXIMETRY MLT: CPT

## 2023-07-26 PROCEDURE — 82803 BLOOD GASES ANY COMBINATION: CPT

## 2023-07-26 PROCEDURE — 25010000002 METHYLPREDNISOLONE PER 125 MG: Performed by: FAMILY MEDICINE

## 2023-07-26 PROCEDURE — 83880 ASSAY OF NATRIURETIC PEPTIDE: CPT | Performed by: FAMILY MEDICINE

## 2023-07-26 PROCEDURE — 99283 EMERGENCY DEPT VISIT LOW MDM: CPT

## 2023-07-26 PROCEDURE — 25510000001 IOPAMIDOL PER 1 ML: Performed by: FAMILY MEDICINE

## 2023-07-26 PROCEDURE — 85379 FIBRIN DEGRADATION QUANT: CPT | Performed by: FAMILY MEDICINE

## 2023-07-26 PROCEDURE — 85025 COMPLETE CBC W/AUTO DIFF WBC: CPT | Performed by: FAMILY MEDICINE

## 2023-07-26 PROCEDURE — 71275 CT ANGIOGRAPHY CHEST: CPT

## 2023-07-26 PROCEDURE — 93005 ELECTROCARDIOGRAM TRACING: CPT

## 2023-07-26 PROCEDURE — 93010 ELECTROCARDIOGRAM REPORT: CPT | Performed by: INTERNAL MEDICINE

## 2023-07-26 RX ORDER — IPRATROPIUM BROMIDE AND ALBUTEROL SULFATE 2.5; .5 MG/3ML; MG/3ML
3 SOLUTION RESPIRATORY (INHALATION) ONCE
Status: COMPLETED | OUTPATIENT
Start: 2023-07-26 | End: 2023-07-26

## 2023-07-26 RX ORDER — SODIUM CHLORIDE 0.9 % (FLUSH) 0.9 %
10 SYRINGE (ML) INJECTION AS NEEDED
Status: DISCONTINUED | OUTPATIENT
Start: 2023-07-26 | End: 2023-07-26 | Stop reason: HOSPADM

## 2023-07-26 RX ORDER — METHYLPREDNISOLONE SODIUM SUCCINATE 125 MG/2ML
125 INJECTION, POWDER, LYOPHILIZED, FOR SOLUTION INTRAMUSCULAR; INTRAVENOUS ONCE
Status: COMPLETED | OUTPATIENT
Start: 2023-07-26 | End: 2023-07-26

## 2023-07-26 RX ADMIN — METHYLPREDNISOLONE SODIUM SUCCINATE 125 MG: 125 INJECTION, POWDER, LYOPHILIZED, FOR SOLUTION INTRAMUSCULAR; INTRAVENOUS at 14:49

## 2023-07-26 RX ADMIN — IPRATROPIUM BROMIDE AND ALBUTEROL SULFATE 3 ML: .5; 3 SOLUTION RESPIRATORY (INHALATION) at 14:58

## 2023-07-26 RX ADMIN — IOPAMIDOL 100 ML: 755 INJECTION, SOLUTION INTRAVENOUS at 16:05

## 2023-07-27 ENCOUNTER — LAB (OUTPATIENT)
Dept: LAB | Facility: HOSPITAL | Age: 78
End: 2023-07-27
Payer: MEDICARE

## 2023-07-27 LAB
QT INTERVAL: 348 MS
QTC INTERVAL: 435 MS

## 2023-07-27 PROCEDURE — G0103 PSA SCREENING: HCPCS | Performed by: INTERNAL MEDICINE

## 2023-07-27 PROCEDURE — 84443 ASSAY THYROID STIM HORMONE: CPT | Performed by: INTERNAL MEDICINE

## 2023-07-27 PROCEDURE — 85025 COMPLETE CBC W/AUTO DIFF WBC: CPT | Performed by: INTERNAL MEDICINE

## 2023-07-27 PROCEDURE — 80061 LIPID PANEL: CPT | Performed by: INTERNAL MEDICINE

## 2023-07-27 PROCEDURE — 80053 COMPREHEN METABOLIC PANEL: CPT | Performed by: INTERNAL MEDICINE

## 2023-07-27 PROCEDURE — 81001 URINALYSIS AUTO W/SCOPE: CPT | Performed by: INTERNAL MEDICINE

## 2023-08-03 ENCOUNTER — OFFICE VISIT (OUTPATIENT)
Dept: INTERNAL MEDICINE | Facility: CLINIC | Age: 78
End: 2023-08-03
Payer: MEDICARE

## 2023-08-03 VITALS
DIASTOLIC BLOOD PRESSURE: 58 MMHG | WEIGHT: 245.8 LBS | SYSTOLIC BLOOD PRESSURE: 120 MMHG | TEMPERATURE: 98.6 F | BODY MASS INDEX: 38.58 KG/M2 | OXYGEN SATURATION: 98 % | HEART RATE: 74 BPM | HEIGHT: 67 IN

## 2023-08-03 DIAGNOSIS — D50.0 CHRONIC BLOOD LOSS ANEMIA: ICD-10-CM

## 2023-08-03 DIAGNOSIS — D64.9 ANEMIA, UNSPECIFIED TYPE: ICD-10-CM

## 2023-08-03 DIAGNOSIS — I48.21 PERMANENT ATRIAL FIBRILLATION: ICD-10-CM

## 2023-08-03 DIAGNOSIS — Z79.01 CHRONIC ANTICOAGULATION: Primary | ICD-10-CM

## 2023-08-03 DIAGNOSIS — R79.9 ABNORMAL FINDING OF BLOOD CHEMISTRY, UNSPECIFIED: ICD-10-CM

## 2023-08-03 DIAGNOSIS — I50.32 CHRONIC DIASTOLIC (CONGESTIVE) HEART FAILURE: ICD-10-CM

## 2023-08-03 DIAGNOSIS — E66.01 CLASS 2 SEVERE OBESITY DUE TO EXCESS CALORIES WITH SERIOUS COMORBIDITY AND BODY MASS INDEX (BMI) OF 38.0 TO 38.9 IN ADULT: ICD-10-CM

## 2023-08-03 DIAGNOSIS — I87.2 VENOUS INSUFFICIENCY: ICD-10-CM

## 2023-08-03 DIAGNOSIS — I10 PRIMARY HYPERTENSION: ICD-10-CM

## 2023-08-03 LAB — HBA1C MFR BLD: 5.1 %

## 2023-08-03 RX ORDER — METOPROLOL SUCCINATE 50 MG/1
50 TABLET, EXTENDED RELEASE ORAL DAILY
Qty: 90 TABLET | Refills: 2 | Status: SHIPPED | OUTPATIENT
Start: 2023-08-03

## 2023-08-03 RX ORDER — PANTOPRAZOLE SODIUM 40 MG/1
40 TABLET, DELAYED RELEASE ORAL DAILY
Qty: 90 TABLET | Refills: 3 | Status: SHIPPED | OUTPATIENT
Start: 2023-08-03

## 2023-08-04 ENCOUNTER — READMISSION MANAGEMENT (OUTPATIENT)
Dept: CALL CENTER | Facility: HOSPITAL | Age: 78
End: 2023-08-04
Payer: MEDICARE

## 2023-08-04 PROBLEM — D50.0 CHRONIC BLOOD LOSS ANEMIA: Status: ACTIVE | Noted: 2023-08-04

## 2023-08-04 LAB
BUN SERPL-MCNC: 9 MG/DL (ref 8–23)
BUN/CREAT SERPL: 9.1 (ref 7–25)
CALCIUM SERPL-MCNC: 9 MG/DL (ref 8.6–10.5)
CHLORIDE SERPL-SCNC: 99 MMOL/L (ref 98–107)
CO2 SERPL-SCNC: 29.7 MMOL/L (ref 22–29)
CREAT SERPL-MCNC: 0.99 MG/DL (ref 0.76–1.27)
EGFRCR SERPLBLD CKD-EPI 2021: 78.5 ML/MIN/1.73
GLUCOSE SERPL-MCNC: 88 MG/DL (ref 65–99)
POTASSIUM SERPL-SCNC: 3.9 MMOL/L (ref 3.5–5.2)
SODIUM SERPL-SCNC: 139 MMOL/L (ref 136–145)

## 2023-08-07 ENCOUNTER — TELEPHONE (OUTPATIENT)
Dept: INTERNAL MEDICINE | Facility: CLINIC | Age: 78
End: 2023-08-07
Payer: MEDICARE

## 2023-08-08 DIAGNOSIS — I50.32 CHRONIC DIASTOLIC (CONGESTIVE) HEART FAILURE: ICD-10-CM

## 2023-08-09 ENCOUNTER — OFFICE VISIT (OUTPATIENT)
Dept: GASTROENTEROLOGY | Facility: CLINIC | Age: 78
End: 2023-08-09
Payer: MEDICARE

## 2023-08-09 VITALS
HEIGHT: 67 IN | DIASTOLIC BLOOD PRESSURE: 64 MMHG | BODY MASS INDEX: 37.83 KG/M2 | SYSTOLIC BLOOD PRESSURE: 126 MMHG | WEIGHT: 241 LBS | OXYGEN SATURATION: 100 % | TEMPERATURE: 97.6 F | HEART RATE: 90 BPM

## 2023-08-09 DIAGNOSIS — D50.9 IRON DEFICIENCY ANEMIA, UNSPECIFIED IRON DEFICIENCY ANEMIA TYPE: Primary | ICD-10-CM

## 2023-08-14 ENCOUNTER — OFFICE VISIT (OUTPATIENT)
Dept: CARDIOLOGY | Facility: CLINIC | Age: 78
End: 2023-08-14
Payer: MEDICARE

## 2023-08-14 ENCOUNTER — LAB (OUTPATIENT)
Dept: LAB | Facility: HOSPITAL | Age: 78
End: 2023-08-14
Payer: MEDICARE

## 2023-08-14 VITALS
HEART RATE: 107 BPM | DIASTOLIC BLOOD PRESSURE: 72 MMHG | WEIGHT: 227.6 LBS | BODY MASS INDEX: 35.72 KG/M2 | OXYGEN SATURATION: 97 % | HEIGHT: 67 IN | SYSTOLIC BLOOD PRESSURE: 116 MMHG

## 2023-08-14 DIAGNOSIS — I48.21 PERMANENT ATRIAL FIBRILLATION: Primary | ICD-10-CM

## 2023-08-14 DIAGNOSIS — Z87.11 H/O GASTRIC ULCER: ICD-10-CM

## 2023-08-14 DIAGNOSIS — I50.31 ACUTE DIASTOLIC CHF (CONGESTIVE HEART FAILURE): ICD-10-CM

## 2023-08-14 DIAGNOSIS — I10 PRIMARY HYPERTENSION: ICD-10-CM

## 2023-08-14 DIAGNOSIS — D50.9 IRON DEFICIENCY ANEMIA, UNSPECIFIED IRON DEFICIENCY ANEMIA TYPE: ICD-10-CM

## 2023-08-14 LAB
ANION GAP SERPL CALCULATED.3IONS-SCNC: 11 MMOL/L (ref 5–15)
ANISOCYTOSIS BLD QL: ABNORMAL
BASOPHILS # BLD MANUAL: 0.06 10*3/MM3 (ref 0–0.2)
BASOPHILS NFR BLD MANUAL: 1 % (ref 0–1.5)
BUN SERPL-MCNC: 10 MG/DL (ref 8–23)
BUN/CREAT SERPL: 10.1 (ref 7–25)
CALCIUM SPEC-SCNC: 9.5 MG/DL (ref 8.6–10.5)
CHLORIDE SERPL-SCNC: 99 MMOL/L (ref 98–107)
CLUMPED PLATELETS: PRESENT
CO2 SERPL-SCNC: 29 MMOL/L (ref 22–29)
CREAT SERPL-MCNC: 0.99 MG/DL (ref 0.76–1.27)
DEPRECATED RDW RBC AUTO: 98.9 FL (ref 37–54)
EGFRCR SERPLBLD CKD-EPI 2021: 78.5 ML/MIN/1.73
EOSINOPHIL # BLD MANUAL: 0.06 10*3/MM3 (ref 0–0.4)
EOSINOPHIL NFR BLD MANUAL: 1 % (ref 0.3–6.2)
ERYTHROCYTE [DISTWIDTH] IN BLOOD BY AUTOMATED COUNT: 27.3 % (ref 12.3–15.4)
GIANT PLATELETS: ABNORMAL
GLUCOSE SERPL-MCNC: 116 MG/DL (ref 65–99)
HCT VFR BLD AUTO: 31.6 % (ref 37.5–51)
HGB BLD-MCNC: 9.3 G/DL (ref 13–17.7)
HYPOCHROMIA BLD QL: ABNORMAL
LYMPHOCYTES # BLD MANUAL: 1.38 10*3/MM3 (ref 0.7–3.1)
LYMPHOCYTES NFR BLD MANUAL: 4 % (ref 5–12)
MACROCYTES BLD QL SMEAR: ABNORMAL
MCH RBC QN AUTO: 30.3 PG (ref 26.6–33)
MCHC RBC AUTO-ENTMCNC: 29.4 G/DL (ref 31.5–35.7)
MCV RBC AUTO: 102.9 FL (ref 79–97)
MONOCYTES # BLD: 0.22 10*3/MM3 (ref 0.1–0.9)
NEUTROPHILS # BLD AUTO: 3.8 10*3/MM3 (ref 1.7–7)
NEUTROPHILS NFR BLD MANUAL: 62 % (ref 42.7–76)
NEUTS BAND NFR BLD MANUAL: 7 % (ref 0–5)
PLATELET # BLD AUTO: 183 10*3/MM3 (ref 140–450)
PMV BLD AUTO: 9.6 FL (ref 6–12)
POIKILOCYTOSIS BLD QL SMEAR: ABNORMAL
POLYCHROMASIA BLD QL SMEAR: ABNORMAL
POTASSIUM SERPL-SCNC: 4.1 MMOL/L (ref 3.5–5.2)
RBC # BLD AUTO: 3.07 10*6/MM3 (ref 4.14–5.8)
SODIUM SERPL-SCNC: 139 MMOL/L (ref 136–145)
VARIANT LYMPHS NFR BLD MANUAL: 25 % (ref 19.6–45.3)
WBC MORPH BLD: NORMAL
WBC NRBC COR # BLD: 5.51 10*3/MM3 (ref 3.4–10.8)

## 2023-08-14 PROCEDURE — 93000 ELECTROCARDIOGRAM COMPLETE: CPT | Performed by: NURSE PRACTITIONER

## 2023-08-14 PROCEDURE — 99214 OFFICE O/P EST MOD 30 MIN: CPT | Performed by: NURSE PRACTITIONER

## 2023-08-14 PROCEDURE — 80048 BASIC METABOLIC PNL TOTAL CA: CPT | Performed by: NURSE PRACTITIONER

## 2023-08-14 PROCEDURE — 36415 COLL VENOUS BLD VENIPUNCTURE: CPT | Performed by: NURSE PRACTITIONER

## 2023-08-14 PROCEDURE — 85007 BL SMEAR W/DIFF WBC COUNT: CPT | Performed by: NURSE PRACTITIONER

## 2023-08-14 PROCEDURE — 3074F SYST BP LT 130 MM HG: CPT | Performed by: NURSE PRACTITIONER

## 2023-08-14 PROCEDURE — 3078F DIAST BP <80 MM HG: CPT | Performed by: NURSE PRACTITIONER

## 2023-08-14 PROCEDURE — 1160F RVW MEDS BY RX/DR IN RCRD: CPT | Performed by: NURSE PRACTITIONER

## 2023-08-14 PROCEDURE — 85025 COMPLETE CBC W/AUTO DIFF WBC: CPT | Performed by: NURSE PRACTITIONER

## 2023-08-14 PROCEDURE — 1159F MED LIST DOCD IN RCRD: CPT | Performed by: NURSE PRACTITIONER

## 2023-08-15 DIAGNOSIS — I50.32 CHRONIC DIASTOLIC (CONGESTIVE) HEART FAILURE: ICD-10-CM

## 2023-08-16 DIAGNOSIS — D64.9 ANEMIA, UNSPECIFIED TYPE: ICD-10-CM

## 2023-08-16 RX ORDER — PANTOPRAZOLE SODIUM 40 MG/1
TABLET, DELAYED RELEASE ORAL
Qty: 90 TABLET | Refills: 0 | OUTPATIENT
Start: 2023-08-16

## 2023-08-25 ENCOUNTER — TELEPHONE (OUTPATIENT)
Dept: INTERNAL MEDICINE | Facility: CLINIC | Age: 78
End: 2023-08-25
Payer: MEDICARE

## 2023-08-25 DIAGNOSIS — I50.32 CHRONIC DIASTOLIC (CONGESTIVE) HEART FAILURE: ICD-10-CM

## 2023-08-29 ENCOUNTER — TELEPHONE (OUTPATIENT)
Dept: CARDIOLOGY | Facility: CLINIC | Age: 78
End: 2023-08-29
Payer: MEDICARE

## 2023-08-31 ENCOUNTER — PREP FOR SURGERY (OUTPATIENT)
Dept: OTHER | Facility: HOSPITAL | Age: 78
End: 2023-08-31
Payer: MEDICARE

## 2023-08-31 DIAGNOSIS — I48.21 PERMANENT ATRIAL FIBRILLATION: Primary | ICD-10-CM

## 2023-08-31 RX ORDER — SODIUM CHLORIDE, SODIUM LACTATE, POTASSIUM CHLORIDE, CALCIUM CHLORIDE 600; 310; 30; 20 MG/100ML; MG/100ML; MG/100ML; MG/100ML
1 INJECTION, SOLUTION INTRAVENOUS CONTINUOUS
OUTPATIENT
Start: 2023-08-31

## 2023-08-31 RX ORDER — SODIUM CHLORIDE 0.9 % (FLUSH) 0.9 %
10 SYRINGE (ML) INJECTION EVERY 12 HOURS SCHEDULED
OUTPATIENT
Start: 2023-08-31

## 2023-08-31 RX ORDER — SODIUM CHLORIDE 0.9 % (FLUSH) 0.9 %
10 SYRINGE (ML) INJECTION AS NEEDED
OUTPATIENT
Start: 2023-08-31

## 2023-08-31 RX ORDER — ASPIRIN 81 MG/1
324 TABLET, CHEWABLE ORAL ONCE
OUTPATIENT
Start: 2023-08-31 | End: 2023-08-31

## 2023-09-07 ENCOUNTER — OFFICE VISIT (OUTPATIENT)
Dept: INTERNAL MEDICINE | Facility: CLINIC | Age: 78
End: 2023-09-07
Payer: MEDICARE

## 2023-09-07 VITALS
TEMPERATURE: 97.8 F | OXYGEN SATURATION: 98 % | SYSTOLIC BLOOD PRESSURE: 132 MMHG | WEIGHT: 226 LBS | DIASTOLIC BLOOD PRESSURE: 70 MMHG | BODY MASS INDEX: 35.47 KG/M2 | HEART RATE: 86 BPM | HEIGHT: 67 IN

## 2023-09-07 DIAGNOSIS — I48.21 PERMANENT ATRIAL FIBRILLATION: ICD-10-CM

## 2023-09-07 DIAGNOSIS — Z79.01 CHRONIC ANTICOAGULATION: ICD-10-CM

## 2023-09-07 DIAGNOSIS — D50.0 CHRONIC BLOOD LOSS ANEMIA: ICD-10-CM

## 2023-09-07 DIAGNOSIS — I87.2 VENOUS INSUFFICIENCY: Primary | ICD-10-CM

## 2023-09-07 DIAGNOSIS — I48.91 ATRIAL FIBRILLATION WITH RVR: ICD-10-CM

## 2023-09-07 DIAGNOSIS — I10 PRIMARY HYPERTENSION: ICD-10-CM

## 2023-09-07 DIAGNOSIS — I50.32 CHRONIC DIASTOLIC (CONGESTIVE) HEART FAILURE: ICD-10-CM

## 2023-09-11 ENCOUNTER — HOSPITAL ENCOUNTER (OUTPATIENT)
Dept: CARDIOLOGY | Facility: HOSPITAL | Age: 78
Setting detail: HOSPITAL OUTPATIENT SURGERY
Discharge: HOME | End: 2023-09-11
Payer: MEDICARE

## 2023-09-11 ENCOUNTER — HOSPITAL ENCOUNTER (OUTPATIENT)
Dept: GENERAL RADIOLOGY | Facility: HOSPITAL | Age: 78
Discharge: HOME | End: 2023-09-11
Payer: MEDICARE

## 2023-09-11 DIAGNOSIS — I48.21 PERMANENT ATRIAL FIBRILLATION: ICD-10-CM

## 2023-09-11 LAB
ABO GROUP BLD: NORMAL
ALBUMIN SERPL-MCNC: 4.2 G/DL (ref 3.5–5.2)
ALBUMIN/GLOB SERPL: 2.1 G/DL
ALP SERPL-CCNC: 131 U/L (ref 39–117)
ALT SERPL W P-5'-P-CCNC: 10 U/L (ref 1–41)
ANION GAP SERPL CALCULATED.3IONS-SCNC: 13 MMOL/L (ref 5–15)
ANISOCYTOSIS BLD QL: ABNORMAL
AST SERPL-CCNC: 21 U/L (ref 1–40)
BILIRUB SERPL-MCNC: 1.3 MG/DL (ref 0–1.2)
BLD GP AB SCN SERPL QL: NEGATIVE
BUN SERPL-MCNC: 6 MG/DL (ref 8–23)
BUN/CREAT SERPL: 6.5 (ref 7–25)
CALCIUM SPEC-SCNC: 8.2 MG/DL (ref 8.6–10.5)
CHLORIDE SERPL-SCNC: 98 MMOL/L (ref 98–107)
CO2 SERPL-SCNC: 28 MMOL/L (ref 22–29)
CREAT SERPL-MCNC: 0.92 MG/DL (ref 0.76–1.27)
DEPRECATED RDW RBC AUTO: 95.3 FL (ref 37–54)
EGFRCR SERPLBLD CKD-EPI 2021: 85.7 ML/MIN/1.73
ERYTHROCYTE [DISTWIDTH] IN BLOOD BY AUTOMATED COUNT: 24.6 % (ref 12.3–15.4)
GLOBULIN UR ELPH-MCNC: 2 GM/DL
GLUCOSE SERPL-MCNC: 108 MG/DL (ref 65–99)
HCT VFR BLD AUTO: 34.5 % (ref 37.5–51)
HGB BLD-MCNC: 10.4 G/DL (ref 13–17.7)
HYPOCHROMIA BLD QL: ABNORMAL
INR PPP: 1.15 (ref 0.91–1.09)
LYMPHOCYTES # BLD MANUAL: 0.62 10*3/MM3 (ref 0.7–3.1)
LYMPHOCYTES NFR BLD MANUAL: 2 % (ref 5–12)
MACROCYTES BLD QL SMEAR: ABNORMAL
MCH RBC QN AUTO: 32.1 PG (ref 26.6–33)
MCHC RBC AUTO-ENTMCNC: 30.1 G/DL (ref 31.5–35.7)
MCV RBC AUTO: 106.5 FL (ref 79–97)
MONOCYTES # BLD: 0.09 10*3/MM3 (ref 0.1–0.9)
NEUTROPHILS # BLD AUTO: 4.02 10*3/MM3 (ref 1.7–7)
NEUTROPHILS NFR BLD MANUAL: 74.7 % (ref 42.7–76)
NEUTS BAND NFR BLD MANUAL: 10.1 % (ref 0–5)
PLAT MORPH BLD: NORMAL
PLATELET # BLD AUTO: 152 10*3/MM3 (ref 140–450)
PMV BLD AUTO: 9.4 FL (ref 6–12)
POIKILOCYTOSIS BLD QL SMEAR: ABNORMAL
POLYCHROMASIA BLD QL SMEAR: ABNORMAL
POTASSIUM SERPL-SCNC: 3.7 MMOL/L (ref 3.5–5.2)
PROT SERPL-MCNC: 6.2 G/DL (ref 6–8.5)
PROTHROMBIN TIME: 14.8 SECONDS (ref 11.8–14.8)
RBC # BLD AUTO: 3.24 10*6/MM3 (ref 4.14–5.8)
RH BLD: POSITIVE
SCHISTOCYTES BLD QL SMEAR: ABNORMAL
SODIUM SERPL-SCNC: 139 MMOL/L (ref 136–145)
T&S EXPIRATION DATE: NORMAL
VARIANT LYMPHS NFR BLD MANUAL: 11.1 % (ref 19.6–45.3)
VARIANT LYMPHS NFR BLD MANUAL: 2 % (ref 0–5)
WBC MORPH BLD: NORMAL
WBC NRBC COR # BLD: 4.74 10*3/MM3 (ref 3.4–10.8)

## 2023-09-11 PROCEDURE — 71046 X-RAY EXAM CHEST 2 VIEWS: CPT

## 2023-09-11 PROCEDURE — 85025 COMPLETE CBC W/AUTO DIFF WBC: CPT | Performed by: INTERNAL MEDICINE

## 2023-09-11 PROCEDURE — 86850 RBC ANTIBODY SCREEN: CPT | Performed by: INTERNAL MEDICINE

## 2023-09-11 PROCEDURE — 86901 BLOOD TYPING SEROLOGIC RH(D): CPT | Performed by: INTERNAL MEDICINE

## 2023-09-11 PROCEDURE — 85610 PROTHROMBIN TIME: CPT | Performed by: INTERNAL MEDICINE

## 2023-09-11 PROCEDURE — 85007 BL SMEAR W/DIFF WBC COUNT: CPT | Performed by: INTERNAL MEDICINE

## 2023-09-11 PROCEDURE — 86900 BLOOD TYPING SEROLOGIC ABO: CPT | Performed by: INTERNAL MEDICINE

## 2023-09-11 PROCEDURE — 93005 ELECTROCARDIOGRAM TRACING: CPT | Performed by: INTERNAL MEDICINE

## 2023-09-11 PROCEDURE — 80053 COMPREHEN METABOLIC PANEL: CPT | Performed by: INTERNAL MEDICINE

## 2023-09-12 ENCOUNTER — ANESTHESIA EVENT (OUTPATIENT)
Dept: CARDIOLOGY | Facility: HOSPITAL | Age: 78
DRG: 274 | End: 2023-09-12
Payer: MEDICARE

## 2023-09-12 ENCOUNTER — HOSPITAL ENCOUNTER (INPATIENT)
Facility: HOSPITAL | Age: 78
LOS: 1 days | Discharge: HOME | DRG: 274 | End: 2023-09-13
Attending: INTERNAL MEDICINE | Admitting: INTERNAL MEDICINE
Payer: MEDICARE

## 2023-09-12 ENCOUNTER — ANESTHESIA (OUTPATIENT)
Dept: CARDIOLOGY | Facility: HOSPITAL | Age: 78
DRG: 274 | End: 2023-09-12
Payer: MEDICARE

## 2023-09-12 ENCOUNTER — APPOINTMENT (OUTPATIENT)
Dept: CARDIOLOGY | Facility: HOSPITAL | Age: 78
DRG: 274 | End: 2023-09-12
Payer: MEDICARE

## 2023-09-12 DIAGNOSIS — Z95.9 CARDIAC DEVICE IN SITU: Primary | ICD-10-CM

## 2023-09-12 DIAGNOSIS — I48.21 PERMANENT ATRIAL FIBRILLATION: ICD-10-CM

## 2023-09-12 PROBLEM — I50.22 CHRONIC SYSTOLIC HEART FAILURE: Status: ACTIVE | Noted: 2023-09-12

## 2023-09-12 LAB
GLUCOSE BLDC GLUCOMTR-MCNC: 106 MG/DL (ref 70–130)
GLUCOSE BLDC GLUCOMTR-MCNC: 117 MG/DL (ref 70–130)

## 2023-09-12 PROCEDURE — C1889 IMPLANT/INSERT DEVICE, NOC: HCPCS | Performed by: INTERNAL MEDICINE

## 2023-09-12 PROCEDURE — 93355 ECHO TRANSESOPHAGEAL (TEE): CPT | Performed by: INTERNAL MEDICINE

## 2023-09-12 PROCEDURE — 82948 REAGENT STRIP/BLOOD GLUCOSE: CPT

## 2023-09-12 PROCEDURE — C1893 INTRO/SHEATH, FIXED,NON-PEEL: HCPCS | Performed by: INTERNAL MEDICINE

## 2023-09-12 PROCEDURE — 33340 PERQ CLSR TCAT L ATR APNDGE: CPT | Performed by: INTERNAL MEDICINE

## 2023-09-12 PROCEDURE — 02L73DK OCCLUSION OF LEFT ATRIAL APPENDAGE WITH INTRALUMINAL DEVICE, PERCUTANEOUS APPROACH: ICD-10-PCS | Performed by: INTERNAL MEDICINE

## 2023-09-12 PROCEDURE — 25010000002 CEFAZOLIN PER 500 MG: Performed by: INTERNAL MEDICINE

## 2023-09-12 PROCEDURE — 25010000002 PROPOFOL 10 MG/ML EMULSION: Performed by: NURSE ANESTHETIST, CERTIFIED REGISTERED

## 2023-09-12 PROCEDURE — 25010000002 HEPARIN (PORCINE) PER 1000 UNITS: Performed by: NURSE ANESTHETIST, CERTIFIED REGISTERED

## 2023-09-12 PROCEDURE — B2151ZZ FLUOROSCOPY OF LEFT HEART USING LOW OSMOLAR CONTRAST: ICD-10-PCS | Performed by: INTERNAL MEDICINE

## 2023-09-12 PROCEDURE — 85347 COAGULATION TIME ACTIVATED: CPT

## 2023-09-12 PROCEDURE — 93355 ECHO TRANSESOPHAGEAL (TEE): CPT

## 2023-09-12 PROCEDURE — C1894 INTRO/SHEATH, NON-LASER: HCPCS | Performed by: INTERNAL MEDICINE

## 2023-09-12 PROCEDURE — 25010000002 ONDANSETRON PER 1 MG: Performed by: NURSE ANESTHETIST, CERTIFIED REGISTERED

## 2023-09-12 PROCEDURE — B24BZZ4 ULTRASONOGRAPHY OF HEART WITH AORTA, TRANSESOPHAGEAL: ICD-10-PCS | Performed by: INTERNAL MEDICINE

## 2023-09-12 PROCEDURE — 25510000001 IOPAMIDOL PER 1 ML: Performed by: INTERNAL MEDICINE

## 2023-09-12 PROCEDURE — 25010000002 SUGAMMADEX 200 MG/2ML SOLUTION: Performed by: NURSE ANESTHETIST, CERTIFIED REGISTERED

## 2023-09-12 DEVICE — LEFT ATRIAL APPENDAGE CLOSURE DEVICE WITH DELIVERY SYSTEM
Type: IMPLANTABLE DEVICE | Site: HEART | Status: FUNCTIONAL
Brand: WATCHMAN FLX™

## 2023-09-12 DEVICE — CAP WATCHMAN FLX PROC: Type: IMPLANTABLE DEVICE | Status: FUNCTIONAL

## 2023-09-12 DEVICE — CAP SYS WATCHMAN TRUSEAL ACC PROC: Type: IMPLANTABLE DEVICE | Status: FUNCTIONAL

## 2023-09-12 RX ORDER — NALOXONE HCL 0.4 MG/ML
0.4 VIAL (ML) INJECTION AS NEEDED
Status: DISCONTINUED | OUTPATIENT
Start: 2023-09-12 | End: 2023-09-13 | Stop reason: HOSPADM

## 2023-09-12 RX ORDER — ACETAMINOPHEN 325 MG/1
650 TABLET ORAL EVERY 4 HOURS PRN
Status: DISCONTINUED | OUTPATIENT
Start: 2023-09-12 | End: 2023-09-13 | Stop reason: HOSPADM

## 2023-09-12 RX ORDER — SODIUM CHLORIDE, SODIUM LACTATE, POTASSIUM CHLORIDE, CALCIUM CHLORIDE 600; 310; 30; 20 MG/100ML; MG/100ML; MG/100ML; MG/100ML
1 INJECTION, SOLUTION INTRAVENOUS CONTINUOUS
Status: DISCONTINUED | OUTPATIENT
Start: 2023-09-12 | End: 2023-09-12

## 2023-09-12 RX ORDER — GUAIFENESIN 600 MG/1
600 TABLET, EXTENDED RELEASE ORAL EVERY 12 HOURS SCHEDULED
Status: DISCONTINUED | OUTPATIENT
Start: 2023-09-12 | End: 2023-09-13 | Stop reason: HOSPADM

## 2023-09-12 RX ORDER — PROPOFOL 10 MG/ML
VIAL (ML) INTRAVENOUS AS NEEDED
Status: DISCONTINUED | OUTPATIENT
Start: 2023-09-12 | End: 2023-09-12 | Stop reason: SURG

## 2023-09-12 RX ORDER — NEOSTIGMINE METHYLSULFATE 5 MG/5 ML
SYRINGE (ML) INTRAVENOUS AS NEEDED
Status: DISCONTINUED | OUTPATIENT
Start: 2023-09-12 | End: 2023-09-12 | Stop reason: SURG

## 2023-09-12 RX ORDER — ATORVASTATIN CALCIUM 40 MG/1
40 TABLET, FILM COATED ORAL NIGHTLY
Status: DISCONTINUED | OUTPATIENT
Start: 2023-09-12 | End: 2023-09-13 | Stop reason: HOSPADM

## 2023-09-12 RX ORDER — ASPIRIN 81 MG/1
81 TABLET ORAL DAILY
Status: DISCONTINUED | OUTPATIENT
Start: 2023-09-13 | End: 2023-09-13 | Stop reason: HOSPADM

## 2023-09-12 RX ORDER — SUCCINYLCHOLINE/SOD CL,ISO/PF 200MG/10ML
SYRINGE (ML) INTRAVENOUS AS NEEDED
Status: DISCONTINUED | OUTPATIENT
Start: 2023-09-12 | End: 2023-09-12 | Stop reason: SURG

## 2023-09-12 RX ORDER — MULTIPLE VITAMINS W/ MINERALS TAB 9MG-400MCG
1 TAB ORAL DAILY
Status: DISCONTINUED | OUTPATIENT
Start: 2023-09-12 | End: 2023-09-13 | Stop reason: HOSPADM

## 2023-09-12 RX ORDER — PANTOPRAZOLE SODIUM 40 MG/1
40 TABLET, DELAYED RELEASE ORAL EVERY MORNING
COMMUNITY

## 2023-09-12 RX ORDER — ALBUTEROL SULFATE 90 UG/1
2 AEROSOL, METERED RESPIRATORY (INHALATION)
COMMUNITY

## 2023-09-12 RX ORDER — FUROSEMIDE 20 MG/1
20 TABLET ORAL EVERY MORNING
COMMUNITY

## 2023-09-12 RX ORDER — NITROGLYCERIN 0.4 MG/1
0.4 TABLET SUBLINGUAL
Status: DISCONTINUED | OUTPATIENT
Start: 2023-09-12 | End: 2023-09-13 | Stop reason: HOSPADM

## 2023-09-12 RX ORDER — METOPROLOL SUCCINATE 100 MG/1
50 TABLET, EXTENDED RELEASE ORAL EVERY MORNING
COMMUNITY

## 2023-09-12 RX ORDER — FUROSEMIDE 20 MG/1
20 TABLET ORAL DAILY PRN
Status: DISCONTINUED | OUTPATIENT
Start: 2023-09-12 | End: 2023-09-13 | Stop reason: HOSPADM

## 2023-09-12 RX ORDER — HEPARIN SODIUM 1000 [USP'U]/ML
INJECTION, SOLUTION INTRAVENOUS; SUBCUTANEOUS AS NEEDED
Status: DISCONTINUED | OUTPATIENT
Start: 2023-09-12 | End: 2023-09-12 | Stop reason: SURG

## 2023-09-12 RX ORDER — SPIRONOLACTONE 25 MG/1
25 TABLET ORAL DAILY
Status: DISCONTINUED | OUTPATIENT
Start: 2023-09-12 | End: 2023-09-13 | Stop reason: HOSPADM

## 2023-09-12 RX ORDER — TAMSULOSIN HYDROCHLORIDE 0.4 MG/1
0.4 CAPSULE ORAL DAILY
Status: DISCONTINUED | OUTPATIENT
Start: 2023-09-12 | End: 2023-09-13 | Stop reason: HOSPADM

## 2023-09-12 RX ORDER — ASPIRIN 81 MG/1
324 TABLET, CHEWABLE ORAL ONCE
Status: COMPLETED | OUTPATIENT
Start: 2023-09-12 | End: 2023-09-12

## 2023-09-12 RX ORDER — SODIUM CHLORIDE 0.9 % (FLUSH) 0.9 %
10 SYRINGE (ML) INJECTION AS NEEDED
Status: DISCONTINUED | OUTPATIENT
Start: 2023-09-12 | End: 2023-09-12

## 2023-09-12 RX ORDER — FERROUS SULFATE 325(65) MG
325 TABLET ORAL
Status: DISCONTINUED | OUTPATIENT
Start: 2023-09-13 | End: 2023-09-13 | Stop reason: HOSPADM

## 2023-09-12 RX ORDER — ONDANSETRON 2 MG/ML
4 INJECTION INTRAMUSCULAR; INTRAVENOUS ONCE AS NEEDED
Status: DISCONTINUED | OUTPATIENT
Start: 2023-09-12 | End: 2023-09-13 | Stop reason: HOSPADM

## 2023-09-12 RX ORDER — DROPERIDOL 2.5 MG/ML
0.62 INJECTION, SOLUTION INTRAMUSCULAR; INTRAVENOUS ONCE AS NEEDED
Status: DISCONTINUED | OUTPATIENT
Start: 2023-09-12 | End: 2023-09-13 | Stop reason: HOSPADM

## 2023-09-12 RX ORDER — FLUMAZENIL 0.1 MG/ML
0.2 INJECTION INTRAVENOUS AS NEEDED
Status: DISCONTINUED | OUTPATIENT
Start: 2023-09-12 | End: 2023-09-13 | Stop reason: HOSPADM

## 2023-09-12 RX ORDER — ALBUTEROL SULFATE 90 UG/1
AEROSOL, METERED RESPIRATORY (INHALATION) AS NEEDED
Status: DISCONTINUED | OUTPATIENT
Start: 2023-09-12 | End: 2023-09-12 | Stop reason: SURG

## 2023-09-12 RX ORDER — PANTOPRAZOLE SODIUM 40 MG/1
40 TABLET, DELAYED RELEASE ORAL DAILY
Status: DISCONTINUED | OUTPATIENT
Start: 2023-09-12 | End: 2023-09-13 | Stop reason: HOSPADM

## 2023-09-12 RX ORDER — SODIUM CHLORIDE 0.9 % (FLUSH) 0.9 %
10 SYRINGE (ML) INJECTION EVERY 12 HOURS SCHEDULED
Status: DISCONTINUED | OUTPATIENT
Start: 2023-09-12 | End: 2023-09-12

## 2023-09-12 RX ORDER — LABETALOL HYDROCHLORIDE 5 MG/ML
5 INJECTION, SOLUTION INTRAVENOUS
Status: DISCONTINUED | OUTPATIENT
Start: 2023-09-12 | End: 2023-09-13 | Stop reason: HOSPADM

## 2023-09-12 RX ORDER — METOPROLOL SUCCINATE 50 MG/1
50 TABLET, EXTENDED RELEASE ORAL DAILY
Status: DISCONTINUED | OUTPATIENT
Start: 2023-09-13 | End: 2023-09-13 | Stop reason: HOSPADM

## 2023-09-12 RX ORDER — ROCURONIUM BROMIDE 10 MG/ML
INJECTION, SOLUTION INTRAVENOUS AS NEEDED
Status: DISCONTINUED | OUTPATIENT
Start: 2023-09-12 | End: 2023-09-12 | Stop reason: SURG

## 2023-09-12 RX ORDER — ALBUTEROL SULFATE 2.5 MG/3ML
2.5 SOLUTION RESPIRATORY (INHALATION) EVERY 6 HOURS PRN
Status: DISCONTINUED | OUTPATIENT
Start: 2023-09-12 | End: 2023-09-13 | Stop reason: HOSPADM

## 2023-09-12 RX ORDER — ASPIRIN 81 MG/1
TABLET, CHEWABLE ORAL
Status: COMPLETED
Start: 2023-09-12 | End: 2023-09-12

## 2023-09-12 RX ORDER — CLOPIDOGREL BISULFATE 75 MG/1
75 TABLET ORAL DAILY
Status: DISCONTINUED | OUTPATIENT
Start: 2023-09-13 | End: 2023-09-13 | Stop reason: HOSPADM

## 2023-09-12 RX ORDER — ATROPINE SULFATE 1 MG/ML
0.5 INJECTION, SOLUTION INTRAMUSCULAR; INTRAVENOUS; SUBCUTANEOUS ONCE AS NEEDED
Status: DISCONTINUED | OUTPATIENT
Start: 2023-09-12 | End: 2023-09-13 | Stop reason: HOSPADM

## 2023-09-12 RX ORDER — ALLOPURINOL 300 MG/1
300 TABLET ORAL DAILY
Status: DISCONTINUED | OUTPATIENT
Start: 2023-09-12 | End: 2023-09-13 | Stop reason: HOSPADM

## 2023-09-12 RX ORDER — ONDANSETRON 2 MG/ML
INJECTION INTRAMUSCULAR; INTRAVENOUS AS NEEDED
Status: DISCONTINUED | OUTPATIENT
Start: 2023-09-12 | End: 2023-09-12 | Stop reason: SURG

## 2023-09-12 RX ORDER — SODIUM CHLORIDE 9 MG/ML
100 INJECTION, SOLUTION INTRAVENOUS CONTINUOUS
Status: DISPENSED | OUTPATIENT
Start: 2023-09-12 | End: 2023-09-12

## 2023-09-12 RX ORDER — FLUTICASONE PROPIONATE AND SALMETEROL 100; 50 UG/1; UG/1
1 POWDER RESPIRATORY (INHALATION)
COMMUNITY
End: 2023-09-13 | Stop reason: HOSPADM

## 2023-09-12 RX ORDER — METOPROLOL TARTRATE 5 MG/5ML
INJECTION INTRAVENOUS
Status: COMPLETED
Start: 2023-09-12 | End: 2023-09-12

## 2023-09-12 RX ADMIN — GUAIFENESIN 600 MG: 600 TABLET, EXTENDED RELEASE ORAL at 21:39

## 2023-09-12 RX ADMIN — SODIUM CHLORIDE 100 ML/HR: 9 INJECTION, SOLUTION INTRAVENOUS at 15:30

## 2023-09-12 RX ADMIN — ALBUTEROL SULFATE 5 PUFF: 90 AEROSOL, METERED RESPIRATORY (INHALATION) at 13:21

## 2023-09-12 RX ADMIN — Medication 3 MG: at 13:14

## 2023-09-12 RX ADMIN — PROPOFOL 200 MG: 10 INJECTION, EMULSION INTRAVENOUS at 12:14

## 2023-09-12 RX ADMIN — ALLOPURINOL 300 MG: 300 TABLET ORAL at 17:39

## 2023-09-12 RX ADMIN — ATORVASTATIN CALCIUM 40 MG: 40 TABLET, FILM COATED ORAL at 21:39

## 2023-09-12 RX ADMIN — Medication 140 MG: at 12:14

## 2023-09-12 RX ADMIN — Medication 10 ML: at 11:30

## 2023-09-12 RX ADMIN — ONDANSETRON 4 MG: 2 INJECTION INTRAMUSCULAR; INTRAVENOUS at 13:08

## 2023-09-12 RX ADMIN — METOROPROLOL TARTRATE 5 MG: 5 INJECTION, SOLUTION INTRAVENOUS at 11:31

## 2023-09-12 RX ADMIN — SODIUM CHLORIDE, POTASSIUM CHLORIDE, SODIUM LACTATE AND CALCIUM CHLORIDE 1 ML/KG/HR: 600; 310; 30; 20 INJECTION, SOLUTION INTRAVENOUS at 11:30

## 2023-09-12 RX ADMIN — HEPARIN SODIUM 5000 UNITS: 1000 INJECTION, SOLUTION INTRAVENOUS; SUBCUTANEOUS at 12:45

## 2023-09-12 RX ADMIN — CEFAZOLIN 2000 MG: 2 INJECTION, POWDER, FOR SOLUTION INTRAMUSCULAR; INTRAVENOUS at 12:23

## 2023-09-12 RX ADMIN — SUGAMMADEX 200 MG: 100 INJECTION, SOLUTION INTRAVENOUS at 13:23

## 2023-09-12 RX ADMIN — ASPIRIN 324 MG: 81 TABLET, CHEWABLE ORAL at 11:29

## 2023-09-12 RX ADMIN — ALBUTEROL SULFATE 3 PUFF: 90 AEROSOL, METERED RESPIRATORY (INHALATION) at 13:19

## 2023-09-12 RX ADMIN — TAMSULOSIN HYDROCHLORIDE 0.4 MG: 0.4 CAPSULE ORAL at 17:39

## 2023-09-12 RX ADMIN — GLYCOPYRROLATE 0.4 MG: 0.2 INJECTION INTRAMUSCULAR; INTRAVENOUS at 13:13

## 2023-09-12 RX ADMIN — ROCURONIUM BROMIDE 10 MG: 10 SOLUTION INTRAVENOUS at 12:14

## 2023-09-12 RX ADMIN — ROCURONIUM BROMIDE 40 MG: 10 SOLUTION INTRAVENOUS at 12:27

## 2023-09-12 RX ADMIN — HEPARIN SODIUM 5000 UNITS: 1000 INJECTION, SOLUTION INTRAVENOUS; SUBCUTANEOUS at 12:42

## 2023-09-13 ENCOUNTER — READMISSION MANAGEMENT (OUTPATIENT)
Dept: CALL CENTER | Facility: HOSPITAL | Age: 78
End: 2023-09-13
Payer: MEDICARE

## 2023-09-13 VITALS
DIASTOLIC BLOOD PRESSURE: 67 MMHG | RESPIRATION RATE: 16 BRPM | OXYGEN SATURATION: 98 % | HEIGHT: 67 IN | SYSTOLIC BLOOD PRESSURE: 131 MMHG | HEART RATE: 79 BPM | TEMPERATURE: 97.8 F | BODY MASS INDEX: 35.31 KG/M2 | WEIGHT: 225 LBS

## 2023-09-13 LAB
ANION GAP SERPL CALCULATED.3IONS-SCNC: 8 MMOL/L (ref 5–15)
BUN SERPL-MCNC: 8 MG/DL (ref 8–23)
BUN/CREAT SERPL: 9.4 (ref 7–25)
CALCIUM SPEC-SCNC: 8.1 MG/DL (ref 8.6–10.5)
CHLORIDE SERPL-SCNC: 101 MMOL/L (ref 98–107)
CO2 SERPL-SCNC: 28 MMOL/L (ref 22–29)
CREAT SERPL-MCNC: 0.85 MG/DL (ref 0.76–1.27)
DEPRECATED RDW RBC AUTO: 98.1 FL (ref 37–54)
EGFRCR SERPLBLD CKD-EPI 2021: 89.5 ML/MIN/1.73
ERYTHROCYTE [DISTWIDTH] IN BLOOD BY AUTOMATED COUNT: 24.8 % (ref 12.3–15.4)
GLUCOSE SERPL-MCNC: 84 MG/DL (ref 65–99)
HCT VFR BLD AUTO: 30.4 % (ref 37.5–51)
HGB BLD-MCNC: 9.2 G/DL (ref 13–17.7)
MCH RBC QN AUTO: 33.2 PG (ref 26.6–33)
MCHC RBC AUTO-ENTMCNC: 30.3 G/DL (ref 31.5–35.7)
MCV RBC AUTO: 109.7 FL (ref 79–97)
PLATELET # BLD AUTO: 138 10*3/MM3 (ref 140–450)
PMV BLD AUTO: 9.8 FL (ref 6–12)
POTASSIUM SERPL-SCNC: 3.7 MMOL/L (ref 3.5–5.2)
QT INTERVAL: 354 MS
QTC INTERVAL: 520 MS
RBC # BLD AUTO: 2.77 10*6/MM3 (ref 4.14–5.8)
SODIUM SERPL-SCNC: 137 MMOL/L (ref 136–145)
WBC NRBC COR # BLD: 4.23 10*3/MM3 (ref 3.4–10.8)

## 2023-09-13 PROCEDURE — 99239 HOSP IP/OBS DSCHRG MGMT >30: CPT | Performed by: NURSE PRACTITIONER

## 2023-09-13 PROCEDURE — 80048 BASIC METABOLIC PNL TOTAL CA: CPT | Performed by: INTERNAL MEDICINE

## 2023-09-13 PROCEDURE — 85027 COMPLETE CBC AUTOMATED: CPT | Performed by: INTERNAL MEDICINE

## 2023-09-13 RX ORDER — CLOPIDOGREL BISULFATE 75 MG/1
75 TABLET ORAL DAILY
Qty: 30 TABLET | Refills: 5 | Status: SHIPPED | OUTPATIENT
Start: 2023-09-13

## 2023-09-13 RX ORDER — ASPIRIN 81 MG/1
81 TABLET ORAL DAILY
Start: 2023-09-13

## 2023-09-13 RX ORDER — METOPROLOL TARTRATE 5 MG/5ML
2 INJECTION INTRAVENOUS
Status: DISCONTINUED | OUTPATIENT
Start: 2023-09-13 | End: 2023-09-13 | Stop reason: HOSPADM

## 2023-09-13 RX ORDER — CLOPIDOGREL BISULFATE 75 MG/1
75 TABLET ORAL DAILY
Qty: 30 TABLET | Refills: 5 | Status: SHIPPED | OUTPATIENT
Start: 2023-09-13 | End: 2023-09-13 | Stop reason: SDUPTHER

## 2023-09-13 RX ADMIN — GUAIFENESIN 600 MG: 600 TABLET, EXTENDED RELEASE ORAL at 08:49

## 2023-09-13 RX ADMIN — EMPAGLIFLOZIN 12.5 MG: 25 TABLET, FILM COATED ORAL at 08:49

## 2023-09-13 RX ADMIN — Medication 1 TABLET: at 08:49

## 2023-09-13 RX ADMIN — ASPIRIN 81 MG: 81 TABLET, COATED ORAL at 08:49

## 2023-09-13 RX ADMIN — METOPROLOL SUCCINATE 50 MG: 50 TABLET, EXTENDED RELEASE ORAL at 08:49

## 2023-09-13 RX ADMIN — FERROUS SULFATE TAB 325 MG (65 MG ELEMENTAL FE) 325 MG: 325 (65 FE) TAB at 08:49

## 2023-09-13 RX ADMIN — CLOPIDOGREL BISULFATE 75 MG: 75 TABLET, FILM COATED ORAL at 08:49

## 2023-09-13 RX ADMIN — ALLOPURINOL 300 MG: 300 TABLET ORAL at 08:49

## 2023-09-13 RX ADMIN — PANTOPRAZOLE SODIUM 40 MG: 40 TABLET, DELAYED RELEASE ORAL at 06:41

## 2023-09-13 RX ADMIN — SPIRONOLACTONE 25 MG: 25 TABLET ORAL at 08:49

## 2023-09-14 ENCOUNTER — TRANSITIONAL CARE MANAGEMENT TELEPHONE ENCOUNTER (OUTPATIENT)
Dept: CALL CENTER | Facility: HOSPITAL | Age: 78
End: 2023-09-14
Payer: MEDICARE

## 2023-09-14 LAB — ACT BLD: 269 SECONDS (ref 82–152)

## 2023-09-21 ENCOUNTER — OFFICE VISIT (OUTPATIENT)
Dept: INTERNAL MEDICINE | Facility: CLINIC | Age: 78
End: 2023-09-21
Payer: MEDICARE

## 2023-09-21 VITALS
HEIGHT: 67 IN | HEART RATE: 50 BPM | BODY MASS INDEX: 35.47 KG/M2 | OXYGEN SATURATION: 95 % | DIASTOLIC BLOOD PRESSURE: 70 MMHG | TEMPERATURE: 97.1 F | WEIGHT: 226 LBS | SYSTOLIC BLOOD PRESSURE: 132 MMHG

## 2023-09-21 DIAGNOSIS — I87.2 VENOUS INSUFFICIENCY: ICD-10-CM

## 2023-09-21 DIAGNOSIS — I48.21 PERMANENT ATRIAL FIBRILLATION: ICD-10-CM

## 2023-09-21 DIAGNOSIS — Z23 NEED FOR PNEUMOCOCCAL VACCINATION: ICD-10-CM

## 2023-09-21 DIAGNOSIS — I50.32 CHRONIC DIASTOLIC (CONGESTIVE) HEART FAILURE: ICD-10-CM

## 2023-09-21 DIAGNOSIS — Z95.818 PRESENCE OF LEFT ATRIAL APPENDAGE CLOSURE DEVICE: ICD-10-CM

## 2023-09-21 DIAGNOSIS — D50.0 CHRONIC BLOOD LOSS ANEMIA: ICD-10-CM

## 2023-09-21 DIAGNOSIS — Z23 FLU VACCINE NEED: Primary | ICD-10-CM

## 2023-09-21 DIAGNOSIS — I10 PRIMARY HYPERTENSION: ICD-10-CM

## 2023-09-21 DIAGNOSIS — Z79.01 CHRONIC ANTICOAGULATION: ICD-10-CM

## 2023-09-23 PROBLEM — Z95.818 PRESENCE OF LEFT ATRIAL APPENDAGE CLOSURE DEVICE: Status: ACTIVE | Noted: 2023-09-23

## 2023-10-25 ENCOUNTER — ANESTHESIA (OUTPATIENT)
Dept: CARDIOLOGY | Facility: HOSPITAL | Age: 78
End: 2023-10-25
Payer: MEDICARE

## 2023-10-25 ENCOUNTER — HOSPITAL ENCOUNTER (OUTPATIENT)
Dept: CARDIOLOGY | Facility: HOSPITAL | Age: 78
Discharge: HOME | End: 2023-10-25
Payer: MEDICARE

## 2023-10-25 ENCOUNTER — ANESTHESIA EVENT (OUTPATIENT)
Dept: CARDIOLOGY | Facility: HOSPITAL | Age: 78
End: 2023-10-25
Payer: MEDICARE

## 2023-10-25 VITALS
SYSTOLIC BLOOD PRESSURE: 100 MMHG | OXYGEN SATURATION: 99 % | HEIGHT: 67 IN | RESPIRATION RATE: 17 BRPM | DIASTOLIC BLOOD PRESSURE: 80 MMHG | WEIGHT: 219 LBS | TEMPERATURE: 97.3 F | HEART RATE: 99 BPM | BODY MASS INDEX: 34.37 KG/M2

## 2023-10-25 DIAGNOSIS — Z95.9 CARDIAC DEVICE IN SITU: ICD-10-CM

## 2023-10-25 DIAGNOSIS — I48.21 PERMANENT ATRIAL FIBRILLATION: ICD-10-CM

## 2023-10-25 PROCEDURE — 93325 DOPPLER ECHO COLOR FLOW MAPG: CPT

## 2023-10-25 PROCEDURE — 25010000002 PROPOFOL 1000 MG/100ML EMULSION: Performed by: NURSE ANESTHETIST, CERTIFIED REGISTERED

## 2023-10-25 RX ORDER — SODIUM CHLORIDE 9 MG/ML
INJECTION, SOLUTION INTRAVENOUS CONTINUOUS PRN
Status: DISCONTINUED | OUTPATIENT
Start: 2023-10-25 | End: 2023-10-25 | Stop reason: SURG

## 2023-10-25 RX ORDER — PROPOFOL 10 MG/ML
INJECTION, EMULSION INTRAVENOUS AS NEEDED
Status: DISCONTINUED | OUTPATIENT
Start: 2023-10-25 | End: 2023-10-25 | Stop reason: SURG

## 2023-10-25 RX ORDER — LIDOCAINE HYDROCHLORIDE 20 MG/ML
INJECTION, SOLUTION EPIDURAL; INFILTRATION; INTRACAUDAL; PERINEURAL AS NEEDED
Status: DISCONTINUED | OUTPATIENT
Start: 2023-10-25 | End: 2023-10-25 | Stop reason: SURG

## 2023-10-25 RX ADMIN — LIDOCAINE HYDROCHLORIDE 100 MG: 20 INJECTION, SOLUTION EPIDURAL; INFILTRATION; INTRACAUDAL; PERINEURAL at 09:42

## 2023-10-25 RX ADMIN — SODIUM CHLORIDE: 900 INJECTION INTRAVENOUS at 09:39

## 2023-10-25 RX ADMIN — PROPOFOL 50 MG: 10 INJECTION, EMULSION INTRAVENOUS at 09:42

## 2023-11-16 ENCOUNTER — OFFICE VISIT (OUTPATIENT)
Dept: CARDIOLOGY | Facility: CLINIC | Age: 78
End: 2023-11-16
Payer: MEDICARE

## 2023-11-16 VITALS
OXYGEN SATURATION: 96 % | HEIGHT: 67 IN | WEIGHT: 215 LBS | HEART RATE: 86 BPM | BODY MASS INDEX: 33.74 KG/M2 | DIASTOLIC BLOOD PRESSURE: 67 MMHG | SYSTOLIC BLOOD PRESSURE: 144 MMHG

## 2023-11-16 DIAGNOSIS — I50.32 CHRONIC DIASTOLIC (CONGESTIVE) HEART FAILURE: ICD-10-CM

## 2023-11-16 DIAGNOSIS — I48.21 PERMANENT ATRIAL FIBRILLATION: Primary | ICD-10-CM

## 2023-11-16 DIAGNOSIS — I10 PRIMARY HYPERTENSION: ICD-10-CM

## 2023-11-16 DIAGNOSIS — Z95.818 PRESENCE OF LEFT ATRIAL APPENDAGE CLOSURE DEVICE: ICD-10-CM

## 2023-11-16 PROBLEM — Z79.01 CHRONIC ANTICOAGULATION: Status: RESOLVED | Noted: 2023-05-18 | Resolved: 2023-11-16

## 2023-11-16 RX ORDER — MAGNESIUM 200 MG
TABLET ORAL
COMMUNITY

## 2023-12-08 ENCOUNTER — OFFICE VISIT (OUTPATIENT)
Dept: INTERNAL MEDICINE | Facility: CLINIC | Age: 78
End: 2023-12-08
Payer: MEDICARE

## 2023-12-08 VITALS
WEIGHT: 216.2 LBS | HEIGHT: 67 IN | SYSTOLIC BLOOD PRESSURE: 138 MMHG | HEART RATE: 111 BPM | DIASTOLIC BLOOD PRESSURE: 70 MMHG | BODY MASS INDEX: 33.93 KG/M2 | OXYGEN SATURATION: 98 % | TEMPERATURE: 97.9 F

## 2023-12-08 DIAGNOSIS — I10 PRIMARY HYPERTENSION: ICD-10-CM

## 2023-12-08 DIAGNOSIS — I50.32 CHRONIC DIASTOLIC (CONGESTIVE) HEART FAILURE: ICD-10-CM

## 2023-12-08 DIAGNOSIS — I87.2 VENOUS INSUFFICIENCY: ICD-10-CM

## 2023-12-08 DIAGNOSIS — I48.21 PERMANENT ATRIAL FIBRILLATION: ICD-10-CM

## 2023-12-08 DIAGNOSIS — R04.0 EPISTAXIS: ICD-10-CM

## 2023-12-08 DIAGNOSIS — D50.0 CHRONIC BLOOD LOSS ANEMIA: Primary | ICD-10-CM

## 2023-12-08 DIAGNOSIS — I50.22 CHRONIC SYSTOLIC HEART FAILURE: ICD-10-CM

## 2023-12-08 DIAGNOSIS — Z95.818 PRESENCE OF WATCHMAN LEFT ATRIAL APPENDAGE CLOSURE DEVICE: ICD-10-CM

## 2023-12-08 PROBLEM — I48.91 ATRIAL FIBRILLATION WITH RVR: Status: RESOLVED | Noted: 2023-07-22 | Resolved: 2023-12-08

## 2023-12-09 LAB
ALBUMIN SERPL-MCNC: 3.9 G/DL (ref 3.5–5.2)
ALBUMIN/GLOB SERPL: 2.3 G/DL
ALP SERPL-CCNC: 120 U/L (ref 39–117)
ALT SERPL-CCNC: 22 U/L (ref 1–41)
AST SERPL-CCNC: 34 U/L (ref 1–40)
BASOPHILS # BLD AUTO: 0.02 10*3/MM3 (ref 0–0.2)
BASOPHILS NFR BLD AUTO: 0.5 % (ref 0–1.5)
BILIRUB SERPL-MCNC: 1 MG/DL (ref 0–1.2)
BUN SERPL-MCNC: 6 MG/DL (ref 8–23)
BUN/CREAT SERPL: 7.4 (ref 7–25)
CALCIUM SERPL-MCNC: 8.9 MG/DL (ref 8.6–10.5)
CHLORIDE SERPL-SCNC: 101 MMOL/L (ref 98–107)
CO2 SERPL-SCNC: 29.6 MMOL/L (ref 22–29)
CREAT SERPL-MCNC: 0.81 MG/DL (ref 0.76–1.27)
EGFRCR SERPLBLD CKD-EPI 2021: 90.8 ML/MIN/1.73
EOSINOPHIL # BLD AUTO: 0.16 10*3/MM3 (ref 0–0.4)
EOSINOPHIL NFR BLD AUTO: 3.7 % (ref 0.3–6.2)
ERYTHROCYTE [DISTWIDTH] IN BLOOD BY AUTOMATED COUNT: 15.1 % (ref 12.3–15.4)
FERRITIN SERPL-MCNC: 90.1 NG/ML (ref 30–400)
GLOBULIN SER CALC-MCNC: 1.7 GM/DL
GLUCOSE SERPL-MCNC: 102 MG/DL (ref 65–99)
HCT VFR BLD AUTO: 33.6 % (ref 37.5–51)
HGB BLD-MCNC: 11.7 G/DL (ref 13–17.7)
IMM GRANULOCYTES # BLD AUTO: 0.02 10*3/MM3 (ref 0–0.05)
IMM GRANULOCYTES NFR BLD AUTO: 0.5 % (ref 0–0.5)
INR PPP: 1.09 (ref 0.91–1.09)
IRON SATN MFR SERPL: 40 % (ref 20–50)
IRON SERPL-MCNC: 133 MCG/DL (ref 59–158)
LYMPHOCYTES # BLD AUTO: 1.09 10*3/MM3 (ref 0.7–3.1)
LYMPHOCYTES NFR BLD AUTO: 25.5 % (ref 19.6–45.3)
MCH RBC QN AUTO: 37.3 PG (ref 26.6–33)
MCHC RBC AUTO-ENTMCNC: 34.8 G/DL (ref 31.5–35.7)
MCV RBC AUTO: 107 FL (ref 79–97)
MONOCYTES # BLD AUTO: 0.36 10*3/MM3 (ref 0.1–0.9)
MONOCYTES NFR BLD AUTO: 8.4 % (ref 5–12)
NEUTROPHILS # BLD AUTO: 2.63 10*3/MM3 (ref 1.7–7)
NEUTROPHILS NFR BLD AUTO: 61.4 % (ref 42.7–76)
PLATELET # BLD AUTO: 156 10*3/MM3 (ref 140–450)
POTASSIUM SERPL-SCNC: 3.5 MMOL/L (ref 3.5–5.2)
PROT SERPL-MCNC: 5.6 G/DL (ref 6–8.5)
PROTHROMBIN TIME: 14.2 SECONDS (ref 11.8–14.8)
RBC # BLD AUTO: 3.14 10*6/MM3 (ref 4.14–5.8)
SODIUM SERPL-SCNC: 141 MMOL/L (ref 136–145)
TIBC SERPL-MCNC: 329 MCG/DL
UIBC SERPL-MCNC: 196 MCG/DL (ref 112–346)
WBC # BLD AUTO: 4.28 10*3/MM3 (ref 3.4–10.8)

## 2024-03-19 ENCOUNTER — TELEPHONE (OUTPATIENT)
Dept: CARDIOLOGY | Facility: CLINIC | Age: 79
End: 2024-03-19
Payer: MEDICARE

## 2024-03-21 ENCOUNTER — PATIENT OUTREACH (OUTPATIENT)
Dept: CASE MANAGEMENT | Facility: OTHER | Age: 79
End: 2024-03-21
Payer: MEDICARE

## 2024-03-22 ENCOUNTER — OFFICE VISIT (OUTPATIENT)
Dept: INTERNAL MEDICINE | Facility: CLINIC | Age: 79
End: 2024-03-22
Payer: MEDICARE

## 2024-03-22 VITALS
DIASTOLIC BLOOD PRESSURE: 68 MMHG | OXYGEN SATURATION: 95 % | BODY MASS INDEX: 34.18 KG/M2 | TEMPERATURE: 97.6 F | HEIGHT: 67 IN | SYSTOLIC BLOOD PRESSURE: 132 MMHG | WEIGHT: 217.8 LBS | HEART RATE: 94 BPM

## 2024-03-22 DIAGNOSIS — I87.2 VENOUS INSUFFICIENCY: ICD-10-CM

## 2024-03-22 DIAGNOSIS — D50.9 IRON DEFICIENCY ANEMIA, UNSPECIFIED IRON DEFICIENCY ANEMIA TYPE: ICD-10-CM

## 2024-03-22 DIAGNOSIS — I10 PRIMARY HYPERTENSION: ICD-10-CM

## 2024-03-22 DIAGNOSIS — I50.22 CHRONIC SYSTOLIC HEART FAILURE: ICD-10-CM

## 2024-03-22 DIAGNOSIS — Z79.899 ENCOUNTER FOR LONG-TERM CURRENT USE OF MEDICATION: ICD-10-CM

## 2024-03-22 DIAGNOSIS — Z95.818 PRESENCE OF WATCHMAN LEFT ATRIAL APPENDAGE CLOSURE DEVICE: Primary | ICD-10-CM

## 2024-03-22 DIAGNOSIS — I50.32 CHRONIC DIASTOLIC (CONGESTIVE) HEART FAILURE: ICD-10-CM

## 2024-03-25 LAB
ALBUMIN SERPL-MCNC: 3.7 G/DL (ref 3.5–5.2)
ALBUMIN/GLOB SERPL: 2.2 G/DL
ALP SERPL-CCNC: 141 U/L (ref 39–117)
ALT SERPL-CCNC: 30 U/L (ref 1–41)
APPEARANCE UR: CLEAR
AST SERPL-CCNC: 49 U/L (ref 1–40)
BACTERIA #/AREA URNS HPF: NORMAL /HPF
BASOPHILS # BLD MANUAL: 0.04 10*3/MM3 (ref 0–0.2)
BASOPHILS NFR BLD MANUAL: 1 % (ref 0–1.5)
BILIRUB SERPL-MCNC: 1.1 MG/DL (ref 0–1.2)
BILIRUB UR QL STRIP: NEGATIVE
BUN SERPL-MCNC: 5 MG/DL (ref 8–23)
BUN/CREAT SERPL: 5.7 (ref 7–25)
CALCIUM SERPL-MCNC: 8.6 MG/DL (ref 8.6–10.5)
CASTS URNS MICRO: NORMAL
CHLORIDE SERPL-SCNC: 101 MMOL/L (ref 98–107)
CHOLEST SERPL-MCNC: 112 MG/DL (ref 0–200)
CO2 SERPL-SCNC: 32.2 MMOL/L (ref 22–29)
COLOR UR: ABNORMAL
CREAT SERPL-MCNC: 0.87 MG/DL (ref 0.76–1.27)
DIFFERENTIAL COMMENT: ABNORMAL
EGFRCR SERPLBLD CKD-EPI 2021: 88.9 ML/MIN/1.73
EOSINOPHIL # BLD MANUAL: 0.17 10*3/MM3 (ref 0–0.4)
EOSINOPHIL NFR BLD MANUAL: 4 % (ref 0.3–6.2)
EPI CELLS #/AREA URNS HPF: NORMAL /HPF
ERYTHROCYTE [DISTWIDTH] IN BLOOD BY AUTOMATED COUNT: 13.6 % (ref 12.3–15.4)
GLOBULIN SER CALC-MCNC: 1.7 GM/DL
GLUCOSE SERPL-MCNC: 107 MG/DL (ref 65–99)
GLUCOSE UR QL STRIP: ABNORMAL
HCT VFR BLD AUTO: 38.4 % (ref 37.5–51)
HDLC SERPL-MCNC: 51 MG/DL (ref 40–60)
HGB BLD-MCNC: 12.7 G/DL (ref 13–17.7)
HGB UR QL STRIP: NEGATIVE
KETONES UR QL STRIP: ABNORMAL
LDLC SERPL CALC-MCNC: 46 MG/DL (ref 0–100)
LEUKOCYTE ESTERASE UR QL STRIP: NEGATIVE
LYMPHOCYTES # BLD MANUAL: 0.56 10*3/MM3 (ref 0.7–3.1)
LYMPHOCYTES NFR BLD MANUAL: 11.1 % (ref 19.6–45.3)
MCH RBC QN AUTO: 34.9 PG (ref 26.6–33)
MCHC RBC AUTO-ENTMCNC: 33.1 G/DL (ref 31.5–35.7)
MCV RBC AUTO: 105.5 FL (ref 79–97)
MONOCYTES # BLD MANUAL: 0.43 10*3/MM3 (ref 0.1–0.9)
MONOCYTES NFR BLD MANUAL: 10.1 % (ref 5–12)
NEUTROPHILS # BLD MANUAL: 3.04 10*3/MM3 (ref 1.7–7)
NEUTROPHILS NFR BLD MANUAL: 70.7 % (ref 42.7–76)
NITRITE UR QL STRIP: NEGATIVE
NRBC BLD AUTO-RTO: 0 /100 WBC (ref 0–0.2)
PH UR STRIP: 5.5 [PH] (ref 5–8)
PLATELET # BLD AUTO: 159 10*3/MM3 (ref 140–450)
PLATELET BLD QL SMEAR: ABNORMAL
POTASSIUM SERPL-SCNC: 3.8 MMOL/L (ref 3.5–5.2)
PROT SERPL-MCNC: 5.4 G/DL (ref 6–8.5)
PROT UR QL STRIP: ABNORMAL
RBC # BLD AUTO: 3.64 10*6/MM3 (ref 4.14–5.8)
RBC #/AREA URNS HPF: NORMAL /HPF
RBC MORPH BLD: ABNORMAL
SODIUM SERPL-SCNC: 142 MMOL/L (ref 136–145)
SP GR UR STRIP: ABNORMAL (ref 1–1.03)
TRIGL SERPL-MCNC: 72 MG/DL (ref 0–150)
TSH SERPL DL<=0.005 MIU/L-ACNC: 3.25 UIU/ML (ref 0.27–4.2)
UROBILINOGEN UR STRIP-MCNC: ABNORMAL MG/DL
VLDLC SERPL CALC-MCNC: 15 MG/DL (ref 5–40)
WBC # BLD AUTO: 4.3 10*3/MM3 (ref 3.4–10.8)
WBC #/AREA URNS HPF: NORMAL /HPF
YEAST #/AREA URNS HPF: NORMAL /HPF

## 2024-03-27 ENCOUNTER — TELEPHONE (OUTPATIENT)
Dept: INTERNAL MEDICINE | Facility: CLINIC | Age: 79
End: 2024-03-27
Payer: MEDICARE

## 2024-05-16 ENCOUNTER — OFFICE VISIT (OUTPATIENT)
Dept: CARDIOLOGY | Facility: CLINIC | Age: 79
End: 2024-05-16
Payer: MEDICARE

## 2024-05-16 VITALS
DIASTOLIC BLOOD PRESSURE: 82 MMHG | WEIGHT: 220 LBS | HEIGHT: 67 IN | HEART RATE: 92 BPM | BODY MASS INDEX: 34.53 KG/M2 | OXYGEN SATURATION: 95 % | SYSTOLIC BLOOD PRESSURE: 130 MMHG

## 2024-05-16 DIAGNOSIS — Z95.818 PRESENCE OF WATCHMAN LEFT ATRIAL APPENDAGE CLOSURE DEVICE: ICD-10-CM

## 2024-05-16 DIAGNOSIS — I50.32 CHRONIC DIASTOLIC (CONGESTIVE) HEART FAILURE: ICD-10-CM

## 2024-05-16 DIAGNOSIS — I48.21 PERMANENT ATRIAL FIBRILLATION: Primary | ICD-10-CM

## 2024-05-16 PROCEDURE — 1160F RVW MEDS BY RX/DR IN RCRD: CPT | Performed by: INTERNAL MEDICINE

## 2024-05-16 PROCEDURE — 3075F SYST BP GE 130 - 139MM HG: CPT | Performed by: INTERNAL MEDICINE

## 2024-05-16 PROCEDURE — 1159F MED LIST DOCD IN RCRD: CPT | Performed by: INTERNAL MEDICINE

## 2024-05-16 PROCEDURE — 3079F DIAST BP 80-89 MM HG: CPT | Performed by: INTERNAL MEDICINE

## 2024-05-16 PROCEDURE — 99214 OFFICE O/P EST MOD 30 MIN: CPT | Performed by: INTERNAL MEDICINE

## 2024-05-16 PROCEDURE — 93000 ELECTROCARDIOGRAM COMPLETE: CPT | Performed by: INTERNAL MEDICINE

## 2024-08-21 ENCOUNTER — TELEPHONE (OUTPATIENT)
Dept: INTERNAL MEDICINE | Facility: CLINIC | Age: 79
End: 2024-08-21
Payer: MEDICARE

## 2024-08-21 DIAGNOSIS — E87.6 HYPOKALEMIA: Primary | ICD-10-CM

## 2024-08-21 RX ORDER — POTASSIUM CHLORIDE 750 MG/1
CAPSULE, EXTENDED RELEASE ORAL
Qty: 35 CAPSULE | Refills: 5 | Status: SHIPPED | OUTPATIENT
Start: 2024-08-21

## 2024-08-28 DIAGNOSIS — E87.6 HYPOKALEMIA: ICD-10-CM

## 2024-08-28 RX ORDER — POTASSIUM CHLORIDE 750 MG/1
CAPSULE, EXTENDED RELEASE ORAL
Qty: 30 CAPSULE | Refills: 5 | Status: SHIPPED | OUTPATIENT
Start: 2024-08-28

## 2024-09-23 ENCOUNTER — HOSPITAL ENCOUNTER (OUTPATIENT)
Dept: GENERAL RADIOLOGY | Facility: HOSPITAL | Age: 79
Discharge: HOME | End: 2024-09-23
Admitting: INTERNAL MEDICINE
Payer: MEDICARE

## 2024-09-23 ENCOUNTER — OFFICE VISIT (OUTPATIENT)
Dept: INTERNAL MEDICINE | Facility: CLINIC | Age: 79
End: 2024-09-23
Payer: MEDICARE

## 2024-09-23 VITALS
TEMPERATURE: 98.1 F | HEIGHT: 67 IN | SYSTOLIC BLOOD PRESSURE: 124 MMHG | HEART RATE: 103 BPM | DIASTOLIC BLOOD PRESSURE: 68 MMHG | WEIGHT: 223 LBS | BODY MASS INDEX: 35 KG/M2 | OXYGEN SATURATION: 96 %

## 2024-09-23 DIAGNOSIS — D50.9 IRON DEFICIENCY ANEMIA, UNSPECIFIED IRON DEFICIENCY ANEMIA TYPE: ICD-10-CM

## 2024-09-23 DIAGNOSIS — Z11.59 NEED FOR HEPATITIS C SCREENING TEST: ICD-10-CM

## 2024-09-23 DIAGNOSIS — Z79.899 ENCOUNTER FOR LONG-TERM CURRENT USE OF MEDICATION: ICD-10-CM

## 2024-09-23 DIAGNOSIS — M54.16 LUMBAR RADICULOPATHY: ICD-10-CM

## 2024-09-23 DIAGNOSIS — I10 PRIMARY HYPERTENSION: Primary | ICD-10-CM

## 2024-09-23 DIAGNOSIS — R63.0 DECREASED APPETITE: ICD-10-CM

## 2024-09-23 DIAGNOSIS — I50.32 CHRONIC DIASTOLIC (CONGESTIVE) HEART FAILURE: ICD-10-CM

## 2024-09-23 DIAGNOSIS — I48.21 PERMANENT ATRIAL FIBRILLATION: ICD-10-CM

## 2024-09-23 DIAGNOSIS — Z95.818 PRESENCE OF WATCHMAN LEFT ATRIAL APPENDAGE CLOSURE DEVICE: ICD-10-CM

## 2024-09-23 DIAGNOSIS — I87.2 VENOUS INSUFFICIENCY: ICD-10-CM

## 2024-09-23 DIAGNOSIS — E87.6 HYPOKALEMIA: ICD-10-CM

## 2024-09-23 PROCEDURE — 99214 OFFICE O/P EST MOD 30 MIN: CPT | Performed by: INTERNAL MEDICINE

## 2024-09-23 PROCEDURE — 3074F SYST BP LT 130 MM HG: CPT | Performed by: INTERNAL MEDICINE

## 2024-09-23 PROCEDURE — 1126F AMNT PAIN NOTED NONE PRSNT: CPT | Performed by: INTERNAL MEDICINE

## 2024-09-23 PROCEDURE — G2211 COMPLEX E/M VISIT ADD ON: HCPCS | Performed by: INTERNAL MEDICINE

## 2024-09-23 PROCEDURE — 3078F DIAST BP <80 MM HG: CPT | Performed by: INTERNAL MEDICINE

## 2024-09-23 PROCEDURE — 72100 X-RAY EXAM L-S SPINE 2/3 VWS: CPT

## 2024-09-23 RX ORDER — POTASSIUM CHLORIDE 750 MG/1
10 CAPSULE, EXTENDED RELEASE ORAL DAILY
Qty: 30 CAPSULE | Refills: 5 | Status: SHIPPED | OUTPATIENT
Start: 2024-09-23

## 2024-09-24 ENCOUNTER — PREP FOR SURGERY (OUTPATIENT)
Dept: OTHER | Facility: HOSPITAL | Age: 79
End: 2024-09-24
Payer: COMMERCIAL

## 2024-09-24 ENCOUNTER — TELEPHONE (OUTPATIENT)
Dept: CARDIOLOGY | Facility: CLINIC | Age: 79
End: 2024-09-24
Payer: COMMERCIAL

## 2024-09-24 ENCOUNTER — TELEPHONE (OUTPATIENT)
Dept: INTERNAL MEDICINE | Facility: CLINIC | Age: 79
End: 2024-09-24
Payer: COMMERCIAL

## 2024-09-24 ENCOUNTER — TELEPHONE (OUTPATIENT)
Dept: INTERNAL MEDICINE | Facility: CLINIC | Age: 79
End: 2024-09-24

## 2024-09-24 DIAGNOSIS — I48.21 PERMANENT ATRIAL FIBRILLATION: Primary | ICD-10-CM

## 2024-09-24 RX ORDER — SODIUM CHLORIDE 9 MG/ML
40 INJECTION, SOLUTION INTRAVENOUS AS NEEDED
Status: CANCELLED | OUTPATIENT
Start: 2024-09-24

## 2024-09-24 RX ORDER — SODIUM CHLORIDE 0.9 % (FLUSH) 0.9 %
10 SYRINGE (ML) INJECTION EVERY 12 HOURS SCHEDULED
Status: CANCELLED | OUTPATIENT
Start: 2024-09-24

## 2024-09-24 RX ORDER — SODIUM CHLORIDE 9 MG/ML
20 INJECTION, SOLUTION INTRAVENOUS CONTINUOUS
Status: CANCELLED | OUTPATIENT
Start: 2024-09-24

## 2024-09-24 RX ORDER — SODIUM CHLORIDE 0.9 % (FLUSH) 0.9 %
10 SYRINGE (ML) INJECTION AS NEEDED
Status: CANCELLED | OUTPATIENT
Start: 2024-09-24

## 2024-09-30 ENCOUNTER — ANESTHESIA (OUTPATIENT)
Dept: CARDIOLOGY | Facility: HOSPITAL | Age: 79
End: 2024-09-30
Payer: MEDICARE

## 2024-09-30 ENCOUNTER — HOSPITAL ENCOUNTER (OUTPATIENT)
Dept: CARDIOLOGY | Facility: HOSPITAL | Age: 79
Discharge: HOME | End: 2024-09-30
Payer: MEDICARE

## 2024-09-30 ENCOUNTER — ANESTHESIA EVENT (OUTPATIENT)
Dept: CARDIOLOGY | Facility: HOSPITAL | Age: 79
End: 2024-09-30
Payer: MEDICARE

## 2024-09-30 VITALS
RESPIRATION RATE: 14 BRPM | HEART RATE: 101 BPM | OXYGEN SATURATION: 98 % | DIASTOLIC BLOOD PRESSURE: 77 MMHG | HEIGHT: 67 IN | BODY MASS INDEX: 35 KG/M2 | TEMPERATURE: 97 F | SYSTOLIC BLOOD PRESSURE: 124 MMHG | WEIGHT: 223 LBS

## 2024-09-30 DIAGNOSIS — I48.21 PERMANENT ATRIAL FIBRILLATION: ICD-10-CM

## 2024-09-30 PROCEDURE — 93325 DOPPLER ECHO COLOR FLOW MAPG: CPT

## 2024-09-30 PROCEDURE — 25010000002 PROPOFOL 10 MG/ML EMULSION

## 2024-09-30 PROCEDURE — 25810000003 SODIUM CHLORIDE 0.9 % SOLUTION: Performed by: INTERNAL MEDICINE

## 2024-09-30 RX ORDER — SODIUM CHLORIDE 0.9 % (FLUSH) 0.9 %
10 SYRINGE (ML) INJECTION EVERY 12 HOURS SCHEDULED
Status: DISCONTINUED | OUTPATIENT
Start: 2024-09-30 | End: 2024-10-01 | Stop reason: HOSPADM

## 2024-09-30 RX ORDER — SODIUM CHLORIDE 9 MG/ML
20 INJECTION, SOLUTION INTRAVENOUS CONTINUOUS
Status: DISCONTINUED | OUTPATIENT
Start: 2024-09-30 | End: 2024-10-01 | Stop reason: HOSPADM

## 2024-09-30 RX ORDER — LIDOCAINE HYDROCHLORIDE 20 MG/ML
INJECTION, SOLUTION EPIDURAL; INFILTRATION; INTRACAUDAL; PERINEURAL AS NEEDED
Status: DISCONTINUED | OUTPATIENT
Start: 2024-09-30 | End: 2024-09-30 | Stop reason: SURG

## 2024-09-30 RX ORDER — SODIUM CHLORIDE 9 MG/ML
40 INJECTION, SOLUTION INTRAVENOUS AS NEEDED
Status: DISCONTINUED | OUTPATIENT
Start: 2024-09-30 | End: 2024-10-01 | Stop reason: HOSPADM

## 2024-09-30 RX ORDER — PROPOFOL 10 MG/ML
VIAL (ML) INTRAVENOUS AS NEEDED
Status: DISCONTINUED | OUTPATIENT
Start: 2024-09-30 | End: 2024-09-30 | Stop reason: SURG

## 2024-09-30 RX ORDER — SODIUM CHLORIDE 0.9 % (FLUSH) 0.9 %
10 SYRINGE (ML) INJECTION AS NEEDED
Status: DISCONTINUED | OUTPATIENT
Start: 2024-09-30 | End: 2024-10-01 | Stop reason: HOSPADM

## 2024-09-30 RX ADMIN — LIDOCAINE HYDROCHLORIDE 100 MG: 20 INJECTION, SOLUTION EPIDURAL; INFILTRATION; INTRACAUDAL; PERINEURAL at 10:32

## 2024-09-30 RX ADMIN — SODIUM CHLORIDE: 9 INJECTION, SOLUTION INTRAVENOUS at 10:32

## 2024-09-30 RX ADMIN — PROPOFOL 230 MG: 10 INJECTION, EMULSION INTRAVENOUS at 10:32

## 2024-10-23 ENCOUNTER — HOSPITAL ENCOUNTER (INPATIENT)
Facility: HOSPITAL | Age: 79
LOS: 5 days | Discharge: HOME | DRG: 433 | End: 2024-10-28
Attending: FAMILY MEDICINE | Admitting: FAMILY MEDICINE
Payer: MEDICARE

## 2024-10-23 DIAGNOSIS — Z74.09 IMPAIRED MOBILITY: Primary | ICD-10-CM

## 2024-10-23 PROBLEM — F10.10 ALCOHOL ABUSE: Status: ACTIVE | Noted: 2024-10-23

## 2024-10-23 PROBLEM — I48.20 CHRONIC ATRIAL FIBRILLATION: Status: ACTIVE | Noted: 2024-10-23

## 2024-10-23 PROBLEM — L03.90 CELLULITIS: Status: ACTIVE | Noted: 2024-10-23

## 2024-10-23 PROBLEM — R60.1 ANASARCA: Status: ACTIVE | Noted: 2024-10-23

## 2024-10-23 PROBLEM — E80.6 HYPERBILIRUBINEMIA: Status: ACTIVE | Noted: 2024-10-23

## 2024-10-23 PROBLEM — K70.10 ACUTE ALCOHOLIC HEPATITIS: Status: ACTIVE | Noted: 2024-10-23

## 2024-10-23 LAB
ALBUMIN SERPL-MCNC: 3.3 G/DL (ref 3.5–5.2)
ALBUMIN/GLOB SERPL: 1.6 G/DL
ALP SERPL-CCNC: 268 U/L (ref 39–117)
ALT SERPL W P-5'-P-CCNC: 48 U/L (ref 1–41)
ANION GAP SERPL CALCULATED.3IONS-SCNC: 12 MMOL/L (ref 5–15)
AST SERPL-CCNC: 136 U/L (ref 1–40)
BASOPHILS # BLD AUTO: 0.03 10*3/MM3 (ref 0–0.2)
BASOPHILS NFR BLD AUTO: 0.4 % (ref 0–1.5)
BILIRUB CONJ SERPL-MCNC: 7.7 MG/DL (ref 0–0.3)
BILIRUB SERPL-MCNC: 10.6 MG/DL (ref 0–1.2)
BUN SERPL-MCNC: 4 MG/DL (ref 8–23)
BUN/CREAT SERPL: 7.5 (ref 7–25)
CALCIUM SPEC-SCNC: 8 MG/DL (ref 8.6–10.5)
CHLORIDE SERPL-SCNC: 90 MMOL/L (ref 98–107)
CO2 SERPL-SCNC: 32 MMOL/L (ref 22–29)
CREAT SERPL-MCNC: 0.53 MG/DL (ref 0.76–1.27)
DEPRECATED RDW RBC AUTO: 75.7 FL (ref 37–54)
EGFRCR SERPLBLD CKD-EPI 2021: 102.6 ML/MIN/1.73
EOSINOPHIL # BLD AUTO: 0.02 10*3/MM3 (ref 0–0.4)
EOSINOPHIL NFR BLD AUTO: 0.3 % (ref 0.3–6.2)
ERYTHROCYTE [DISTWIDTH] IN BLOOD BY AUTOMATED COUNT: 20.1 % (ref 12.3–15.4)
GLOBULIN UR ELPH-MCNC: 2.1 GM/DL
GLUCOSE SERPL-MCNC: 125 MG/DL (ref 65–99)
HCT VFR BLD AUTO: 34.3 % (ref 37.5–51)
HGB BLD-MCNC: 12 G/DL (ref 13–17.7)
IMM GRANULOCYTES # BLD AUTO: 0.05 10*3/MM3 (ref 0–0.05)
IMM GRANULOCYTES NFR BLD AUTO: 0.7 % (ref 0–0.5)
INR PPP: 1.62 (ref 0.91–1.09)
LYMPHOCYTES # BLD AUTO: 1.65 10*3/MM3 (ref 0.7–3.1)
LYMPHOCYTES NFR BLD AUTO: 21.8 % (ref 19.6–45.3)
MCH RBC QN AUTO: 37 PG (ref 26.6–33)
MCHC RBC AUTO-ENTMCNC: 35 G/DL (ref 31.5–35.7)
MCV RBC AUTO: 105.9 FL (ref 79–97)
MONOCYTES # BLD AUTO: 0.66 10*3/MM3 (ref 0.1–0.9)
MONOCYTES NFR BLD AUTO: 8.7 % (ref 5–12)
NEUTROPHILS NFR BLD AUTO: 5.15 10*3/MM3 (ref 1.7–7)
NEUTROPHILS NFR BLD AUTO: 68.1 % (ref 42.7–76)
NRBC BLD AUTO-RTO: 0.4 /100 WBC (ref 0–0.2)
PLATELET # BLD AUTO: 179 10*3/MM3 (ref 140–450)
PMV BLD AUTO: 10.5 FL (ref 6–12)
POTASSIUM SERPL-SCNC: 3.5 MMOL/L (ref 3.5–5.2)
PROT SERPL-MCNC: 5.4 G/DL (ref 6–8.5)
PROTHROMBIN TIME: 19.9 SECONDS (ref 11.8–14.8)
RBC # BLD AUTO: 3.24 10*6/MM3 (ref 4.14–5.8)
SODIUM SERPL-SCNC: 134 MMOL/L (ref 136–145)
TSH SERPL DL<=0.05 MIU/L-ACNC: 5.74 UIU/ML (ref 0.27–4.2)
WBC NRBC COR # BLD AUTO: 7.56 10*3/MM3 (ref 3.4–10.8)

## 2024-10-23 PROCEDURE — 80053 COMPREHEN METABOLIC PANEL: CPT | Performed by: FAMILY MEDICINE

## 2024-10-23 PROCEDURE — 84443 ASSAY THYROID STIM HORMONE: CPT | Performed by: FAMILY MEDICINE

## 2024-10-23 PROCEDURE — 25010000002 FUROSEMIDE PER 20 MG: Performed by: FAMILY MEDICINE

## 2024-10-23 PROCEDURE — 85025 COMPLETE CBC W/AUTO DIFF WBC: CPT | Performed by: FAMILY MEDICINE

## 2024-10-23 PROCEDURE — 82248 BILIRUBIN DIRECT: CPT | Performed by: FAMILY MEDICINE

## 2024-10-23 PROCEDURE — 82390 ASSAY OF CERULOPLASMIN: CPT | Performed by: FAMILY MEDICINE

## 2024-10-23 PROCEDURE — 25010000002 CEFAZOLIN PER 500 MG: Performed by: FAMILY MEDICINE

## 2024-10-23 PROCEDURE — 86880 COOMBS TEST DIRECT: CPT | Performed by: FAMILY MEDICINE

## 2024-10-23 PROCEDURE — 85610 PROTHROMBIN TIME: CPT | Performed by: FAMILY MEDICINE

## 2024-10-23 PROCEDURE — 25010000002 THIAMINE HCL 200 MG/2ML SOLUTION: Performed by: FAMILY MEDICINE

## 2024-10-23 RX ORDER — LORAZEPAM 2 MG/ML
1 INJECTION INTRAMUSCULAR
Status: DISCONTINUED | OUTPATIENT
Start: 2024-10-23 | End: 2024-10-28 | Stop reason: HOSPADM

## 2024-10-23 RX ORDER — LORAZEPAM 2 MG/ML
2 INJECTION INTRAMUSCULAR
Status: DISCONTINUED | OUTPATIENT
Start: 2024-10-23 | End: 2024-10-28 | Stop reason: HOSPADM

## 2024-10-23 RX ORDER — ONDANSETRON 2 MG/ML
4 INJECTION INTRAMUSCULAR; INTRAVENOUS EVERY 6 HOURS PRN
Status: DISCONTINUED | OUTPATIENT
Start: 2024-10-23 | End: 2024-10-28 | Stop reason: HOSPADM

## 2024-10-23 RX ORDER — SPIRONOLACTONE 25 MG/1
25 TABLET ORAL
Status: DISCONTINUED | OUTPATIENT
Start: 2024-10-24 | End: 2024-10-28 | Stop reason: HOSPADM

## 2024-10-23 RX ORDER — FOLIC ACID 1 MG/1
1 TABLET ORAL DAILY
Status: DISCONTINUED | OUTPATIENT
Start: 2024-10-23 | End: 2024-10-28 | Stop reason: HOSPADM

## 2024-10-23 RX ORDER — ACETAMINOPHEN 325 MG/1
650 TABLET ORAL EVERY 4 HOURS PRN
Status: DISCONTINUED | OUTPATIENT
Start: 2024-10-23 | End: 2024-10-28 | Stop reason: HOSPADM

## 2024-10-23 RX ORDER — POLYETHYLENE GLYCOL 3350 17 G/17G
17 POWDER, FOR SOLUTION ORAL DAILY PRN
Status: DISCONTINUED | OUTPATIENT
Start: 2024-10-23 | End: 2024-10-28 | Stop reason: HOSPADM

## 2024-10-23 RX ORDER — LORAZEPAM 1 MG/1
1 TABLET ORAL
Status: DISCONTINUED | OUTPATIENT
Start: 2024-10-23 | End: 2024-10-28 | Stop reason: HOSPADM

## 2024-10-23 RX ORDER — FERROUS SULFATE 325(65) MG
325 TABLET ORAL
Status: DISCONTINUED | OUTPATIENT
Start: 2024-10-24 | End: 2024-10-28

## 2024-10-23 RX ORDER — METOPROLOL SUCCINATE 50 MG/1
50 TABLET, EXTENDED RELEASE ORAL EVERY MORNING
Status: DISCONTINUED | OUTPATIENT
Start: 2024-10-23 | End: 2024-10-28 | Stop reason: HOSPADM

## 2024-10-23 RX ORDER — TAMSULOSIN HYDROCHLORIDE 0.4 MG/1
0.4 CAPSULE ORAL EVERY EVENING
Status: DISCONTINUED | OUTPATIENT
Start: 2024-10-23 | End: 2024-10-28 | Stop reason: HOSPADM

## 2024-10-23 RX ORDER — SODIUM CHLORIDE 0.9 % (FLUSH) 0.9 %
10 SYRINGE (ML) INJECTION AS NEEDED
Status: DISCONTINUED | OUTPATIENT
Start: 2024-10-23 | End: 2024-10-28 | Stop reason: HOSPADM

## 2024-10-23 RX ORDER — POTASSIUM CHLORIDE 750 MG/1
40 CAPSULE, EXTENDED RELEASE ORAL EVERY 4 HOURS
Status: COMPLETED | OUTPATIENT
Start: 2024-10-23 | End: 2024-10-24

## 2024-10-23 RX ORDER — ALLOPURINOL 100 MG/1
300 TABLET ORAL EVERY MORNING
Status: DISCONTINUED | OUTPATIENT
Start: 2024-10-24 | End: 2024-10-28 | Stop reason: HOSPADM

## 2024-10-23 RX ORDER — PANTOPRAZOLE SODIUM 40 MG/1
40 TABLET, DELAYED RELEASE ORAL
Status: DISCONTINUED | OUTPATIENT
Start: 2024-10-24 | End: 2024-10-28 | Stop reason: HOSPADM

## 2024-10-23 RX ORDER — POTASSIUM CHLORIDE 750 MG/1
10 CAPSULE, EXTENDED RELEASE ORAL DAILY
Status: DISCONTINUED | OUTPATIENT
Start: 2024-10-23 | End: 2024-10-28 | Stop reason: HOSPADM

## 2024-10-23 RX ORDER — AMOXICILLIN 250 MG
2 CAPSULE ORAL 2 TIMES DAILY PRN
Status: DISCONTINUED | OUTPATIENT
Start: 2024-10-23 | End: 2024-10-28 | Stop reason: HOSPADM

## 2024-10-23 RX ORDER — ALUMINA, MAGNESIA, AND SIMETHICONE 2400; 2400; 240 MG/30ML; MG/30ML; MG/30ML
15 SUSPENSION ORAL EVERY 6 HOURS PRN
Status: DISCONTINUED | OUTPATIENT
Start: 2024-10-23 | End: 2024-10-28 | Stop reason: HOSPADM

## 2024-10-23 RX ORDER — ATORVASTATIN CALCIUM 10 MG/1
20 TABLET, FILM COATED ORAL NIGHTLY
Status: DISCONTINUED | OUTPATIENT
Start: 2024-10-23 | End: 2024-10-28 | Stop reason: HOSPADM

## 2024-10-23 RX ORDER — LORAZEPAM 1 MG/1
2 TABLET ORAL
Status: DISCONTINUED | OUTPATIENT
Start: 2024-10-23 | End: 2024-10-28 | Stop reason: HOSPADM

## 2024-10-23 RX ORDER — ACETAMINOPHEN 650 MG/1
650 SUPPOSITORY RECTAL EVERY 4 HOURS PRN
Status: DISCONTINUED | OUTPATIENT
Start: 2024-10-23 | End: 2024-10-28 | Stop reason: HOSPADM

## 2024-10-23 RX ORDER — METOPROLOL SUCCINATE 50 MG/1
50 TABLET, EXTENDED RELEASE ORAL EVERY MORNING
Status: DISCONTINUED | OUTPATIENT
Start: 2024-10-24 | End: 2024-10-23

## 2024-10-23 RX ORDER — BISACODYL 5 MG/1
5 TABLET, DELAYED RELEASE ORAL DAILY PRN
Status: DISCONTINUED | OUTPATIENT
Start: 2024-10-23 | End: 2024-10-28 | Stop reason: HOSPADM

## 2024-10-23 RX ORDER — SODIUM CHLORIDE 9 MG/ML
75 INJECTION, SOLUTION INTRAVENOUS CONTINUOUS
Status: DISCONTINUED | OUTPATIENT
Start: 2024-10-23 | End: 2024-10-23

## 2024-10-23 RX ORDER — BISACODYL 10 MG
10 SUPPOSITORY, RECTAL RECTAL DAILY PRN
Status: DISCONTINUED | OUTPATIENT
Start: 2024-10-23 | End: 2024-10-28 | Stop reason: HOSPADM

## 2024-10-23 RX ORDER — ASPIRIN 81 MG/1
81 TABLET ORAL DAILY
Status: DISCONTINUED | OUTPATIENT
Start: 2024-10-23 | End: 2024-10-28 | Stop reason: HOSPADM

## 2024-10-23 RX ORDER — ACETAMINOPHEN 160 MG/5ML
650 SOLUTION ORAL EVERY 4 HOURS PRN
Status: DISCONTINUED | OUTPATIENT
Start: 2024-10-23 | End: 2024-10-28 | Stop reason: HOSPADM

## 2024-10-23 RX ORDER — THIAMINE HYDROCHLORIDE 100 MG/ML
200 INJECTION, SOLUTION INTRAMUSCULAR; INTRAVENOUS EVERY 8 HOURS SCHEDULED
Status: DISCONTINUED | OUTPATIENT
Start: 2024-10-23 | End: 2024-10-28 | Stop reason: HOSPADM

## 2024-10-23 RX ORDER — SODIUM CHLORIDE 9 MG/ML
40 INJECTION, SOLUTION INTRAVENOUS AS NEEDED
Status: DISCONTINUED | OUTPATIENT
Start: 2024-10-23 | End: 2024-10-28 | Stop reason: HOSPADM

## 2024-10-23 RX ORDER — SODIUM CHLORIDE 0.9 % (FLUSH) 0.9 %
10 SYRINGE (ML) INJECTION EVERY 12 HOURS SCHEDULED
Status: DISCONTINUED | OUTPATIENT
Start: 2024-10-23 | End: 2024-10-28 | Stop reason: HOSPADM

## 2024-10-23 RX ADMIN — POTASSIUM CHLORIDE 40 MEQ: 750 CAPSULE, EXTENDED RELEASE ORAL at 22:11

## 2024-10-23 RX ADMIN — CEFAZOLIN 2000 MG: 2 INJECTION, POWDER, FOR SOLUTION INTRAMUSCULAR; INTRAVENOUS at 19:37

## 2024-10-23 RX ADMIN — FOLIC ACID 1 MG: 1 TABLET ORAL at 20:19

## 2024-10-23 RX ADMIN — ASPIRIN 81 MG: 81 TABLET, COATED ORAL at 19:37

## 2024-10-23 RX ADMIN — METOPROLOL SUCCINATE 50 MG: 50 TABLET, EXTENDED RELEASE ORAL at 20:18

## 2024-10-23 RX ADMIN — FUROSEMIDE 10 MG/HR: 10 INJECTION, SOLUTION INTRAMUSCULAR; INTRAVENOUS at 20:42

## 2024-10-23 RX ADMIN — EMPAGLIFLOZIN 10 MG: 10 TABLET, FILM COATED ORAL at 19:37

## 2024-10-23 RX ADMIN — POTASSIUM CHLORIDE 10 MEQ: 750 CAPSULE, EXTENDED RELEASE ORAL at 19:37

## 2024-10-23 RX ADMIN — TAMSULOSIN HYDROCHLORIDE 0.4 MG: 0.4 CAPSULE ORAL at 19:37

## 2024-10-23 RX ADMIN — THIAMINE HYDROCHLORIDE 200 MG: 100 INJECTION, SOLUTION INTRAMUSCULAR; INTRAVENOUS at 22:11

## 2024-10-23 RX ADMIN — ATORVASTATIN CALCIUM 20 MG: 10 TABLET, FILM COATED ORAL at 19:37

## 2024-10-24 ENCOUNTER — APPOINTMENT (OUTPATIENT)
Dept: ULTRASOUND IMAGING | Facility: HOSPITAL | Age: 79
DRG: 433 | End: 2024-10-24
Payer: MEDICARE

## 2024-10-24 LAB
ALBUMIN SERPL-MCNC: 2.8 G/DL (ref 3.5–5.2)
ALBUMIN/GLOB SERPL: 1.4 G/DL
ALP SERPL-CCNC: 237 U/L (ref 39–117)
ALT SERPL W P-5'-P-CCNC: 40 U/L (ref 1–41)
ANION GAP SERPL CALCULATED.3IONS-SCNC: 12 MMOL/L (ref 5–15)
AST SERPL-CCNC: 129 U/L (ref 1–40)
BILIRUB SERPL-MCNC: 9.3 MG/DL (ref 0–1.2)
BUN SERPL-MCNC: 5 MG/DL (ref 8–23)
BUN/CREAT SERPL: 9.3 (ref 7–25)
CALCIUM SPEC-SCNC: 7.8 MG/DL (ref 8.6–10.5)
CERULOPLASMIN SERPL-MCNC: 26 MG/DL (ref 16–31)
CHLORIDE SERPL-SCNC: 93 MMOL/L (ref 98–107)
CO2 SERPL-SCNC: 28 MMOL/L (ref 22–29)
CREAT SERPL-MCNC: 0.54 MG/DL (ref 0.76–1.27)
EGFRCR SERPLBLD CKD-EPI 2021: 102 ML/MIN/1.73
GLOBULIN UR ELPH-MCNC: 2 GM/DL
GLUCOSE SERPL-MCNC: 121 MG/DL (ref 65–99)
HAV IGM SERPL QL IA: NORMAL
HBV CORE IGM SERPL QL IA: NORMAL
HBV SURFACE AG SERPL QL IA: NORMAL
HCV AB SER QL: NORMAL
POTASSIUM SERPL-SCNC: 4.9 MMOL/L (ref 3.5–5.2)
PROT SERPL-MCNC: 4.8 G/DL (ref 6–8.5)
SODIUM SERPL-SCNC: 133 MMOL/L (ref 136–145)

## 2024-10-24 PROCEDURE — 25010000002 THIAMINE HCL 200 MG/2ML SOLUTION: Performed by: FAMILY MEDICINE

## 2024-10-24 PROCEDURE — 25010000002 LORAZEPAM PER 2 MG: Performed by: FAMILY MEDICINE

## 2024-10-24 PROCEDURE — 99221 1ST HOSP IP/OBS SF/LOW 40: CPT | Performed by: CLINICAL NURSE SPECIALIST

## 2024-10-24 PROCEDURE — 97161 PT EVAL LOW COMPLEX 20 MIN: CPT

## 2024-10-24 PROCEDURE — 80074 ACUTE HEPATITIS PANEL: CPT | Performed by: INTERNAL MEDICINE

## 2024-10-24 PROCEDURE — 25010000002 CEFAZOLIN PER 500 MG: Performed by: FAMILY MEDICINE

## 2024-10-24 PROCEDURE — 80053 COMPREHEN METABOLIC PANEL: CPT | Performed by: FAMILY MEDICINE

## 2024-10-24 RX ORDER — PENTOXIFYLLINE 400 MG/1
400 TABLET, EXTENDED RELEASE ORAL
Status: DISCONTINUED | OUTPATIENT
Start: 2024-10-24 | End: 2024-10-26

## 2024-10-24 RX ORDER — POTASSIUM CHLORIDE 1500 MG/1
10 TABLET, EXTENDED RELEASE ORAL DAILY
COMMUNITY

## 2024-10-24 RX ADMIN — EMPAGLIFLOZIN 10 MG: 10 TABLET, FILM COATED ORAL at 08:53

## 2024-10-24 RX ADMIN — LORAZEPAM 1 MG: 2 INJECTION INTRAMUSCULAR; INTRAVENOUS at 21:38

## 2024-10-24 RX ADMIN — POTASSIUM CHLORIDE 10 MEQ: 750 CAPSULE, EXTENDED RELEASE ORAL at 08:53

## 2024-10-24 RX ADMIN — THIAMINE HYDROCHLORIDE 200 MG: 100 INJECTION, SOLUTION INTRAMUSCULAR; INTRAVENOUS at 13:02

## 2024-10-24 RX ADMIN — FOLIC ACID 1 MG: 1 TABLET ORAL at 08:53

## 2024-10-24 RX ADMIN — Medication 10 ML: at 08:57

## 2024-10-24 RX ADMIN — METOPROLOL SUCCINATE 50 MG: 50 TABLET, EXTENDED RELEASE ORAL at 06:30

## 2024-10-24 RX ADMIN — FERROUS SULFATE TAB 325 MG (65 MG ELEMENTAL FE) 325 MG: 325 (65 FE) TAB at 08:53

## 2024-10-24 RX ADMIN — CEFAZOLIN 2000 MG: 2 INJECTION, POWDER, FOR SOLUTION INTRAMUSCULAR; INTRAVENOUS at 21:27

## 2024-10-24 RX ADMIN — POTASSIUM CHLORIDE 40 MEQ: 750 CAPSULE, EXTENDED RELEASE ORAL at 03:08

## 2024-10-24 RX ADMIN — ATORVASTATIN CALCIUM 20 MG: 10 TABLET, FILM COATED ORAL at 21:27

## 2024-10-24 RX ADMIN — CEFAZOLIN 2000 MG: 2 INJECTION, POWDER, FOR SOLUTION INTRAMUSCULAR; INTRAVENOUS at 11:36

## 2024-10-24 RX ADMIN — PANTOPRAZOLE SODIUM 40 MG: 40 TABLET, DELAYED RELEASE ORAL at 06:30

## 2024-10-24 RX ADMIN — LORAZEPAM 2 MG: 2 INJECTION INTRAMUSCULAR; INTRAVENOUS at 23:23

## 2024-10-24 RX ADMIN — THIAMINE HYDROCHLORIDE 200 MG: 100 INJECTION, SOLUTION INTRAMUSCULAR; INTRAVENOUS at 21:27

## 2024-10-24 RX ADMIN — PENTOXIFYLLINE 400 MG: 400 TABLET, EXTENDED RELEASE ORAL at 17:58

## 2024-10-24 RX ADMIN — ASPIRIN 81 MG: 81 TABLET, COATED ORAL at 08:53

## 2024-10-24 RX ADMIN — Medication 10 ML: at 21:28

## 2024-10-24 RX ADMIN — TAMSULOSIN HYDROCHLORIDE 0.4 MG: 0.4 CAPSULE ORAL at 17:58

## 2024-10-24 RX ADMIN — CEFAZOLIN 2000 MG: 2 INJECTION, POWDER, FOR SOLUTION INTRAMUSCULAR; INTRAVENOUS at 03:09

## 2024-10-24 RX ADMIN — ALLOPURINOL 300 MG: 100 TABLET ORAL at 06:30

## 2024-10-24 RX ADMIN — THIAMINE HYDROCHLORIDE 200 MG: 100 INJECTION, SOLUTION INTRAMUSCULAR; INTRAVENOUS at 06:51

## 2024-10-24 NOTE — PLAN OF CARE
Goal Outcome Evaluation:              Outcome Evaluation: ALOX4. up at [facility]. RA. on CIWA protocol. yellow eyes. SCD's

## 2024-10-24 NOTE — H&P
[facility] Medicine Services  HISTORY AND PHYSICAL    Date of Admission: 10/23/2024  Primary Care Physician: Norberto Candelario MD    Subjective   Primary Historian: The patient    Chief Complaint: Swollen and reddened arms, yellow eyes    History of Present Illness    This 79-year-old male presents on transfer from [facility] emergency department with a chief complaint of swollen and red and arms bilaterally and yellow eyes.  The patient states that he was prescribed Lasix 20 mg daily by his PCP and subsequently lost about 16 pounds.  [facility] and he has had a 14 pound weight gain since that time.  He was to receive physical therapy today but the therapist refused to treat him because of the inflammatory process present in both arms and he was subsequently sent to the emergency department.  Workup at the outlying facility revealed hyperbilirubinemia with a bilirubin of 10.6, alk phos of 268 and an AST of 143.  ALT was within normal limits.  CBC showed a white blood cell count of 7500 with hemoglobin 12.7 and platelet count of 164,000.  Comprehensive CT imaging from the chest through the abdomen and pelvis as well as ultrasound of the right upper quadrant revealed no evidence of biliary dilatation or obstruction.  Chest CT imaging and abdominal and pelvic imaging revealed body wall edema but was otherwise unremarkable.  Bilateral upper extremity Doppler studies were negative for DVT.  Because of elevated bilirubin, the patient was transferred to our facility to a higher level of care.  The patient has a history of chronic atrial fibrillation and has a Watchman device.  He is not anticoagulated as a result.  Rate control is achieved with metoprolol.    Review of Systems   Constitutional: Negative.    HENT: Negative.     Eyes: Negative.    Respiratory: Negative.     Cardiovascular:  Positive for leg swelling.   Gastrointestinal:  Negative.    Endocrine: Negative.    Genitourinary: Negative.    Musculoskeletal: Negative.    Skin:  Positive for color change.   Allergic/Immunologic: Negative.    Neurological: Negative.    Hematological: Negative.    Psychiatric/Behavioral: Negative.        Otherwise complete ROS reviewed and negative except as mentioned in the HPI.    Past Medical History:   Past Medical History:   Diagnosis Date    Arrhythmia     Arthritis     Asthma     Atrial fibrillation     Cancer     skin    Chronic anticoagulation     Clotting disorder     COPD (chronic obstructive pulmonary disease)     GERD (gastroesophageal reflux disease)     History of transfusion     1992 bleeding ulcers dr gonzalez    Hyperlipidemia     Hypertension     Non-ischemic cardiomyopathy 10/14/2021     Past Surgical History:  Past Surgical History:   Procedure Laterality Date    ATRIAL APPENDAGE EXCLUSION LEFT WITH TRANSESOPHAGEAL ECHOCARDIOGRAM Right 09/12/2023    Procedure: Atrial Appendage Occlusion;  Surgeon: Daniel Clayton MD;  Location: [facility] CATH INVASIVE LOCATION;  Service: Cardiology;  Laterality: Right;    CARDIAC CATHETERIZATION      CARDIOVERSION      10/21 and 11/21 dr dawson richardson    CATARACT EXTRACTION Bilateral     laser juanita yoni richardson    COLONOSCOPY N/A 04/15/2022    Procedure: COLONOSCOPY WITH ANESTHESIA;  Surgeon: Fly Seymour DO;  Location: [facility] ENDOSCOPY;  Service: Gastroenterology;  Laterality: N/A;  pre anemia, screen  post polyps, diverticulosis  Norberto Hannah MD    COLONOSCOPY N/A 06/30/2023    Procedure: COLONOSCOPY WITH ANESTHESIA;  Surgeon: Fly Seymour DO;  Location: [facility] ENDOSCOPY;  Service: Gastroenterology;  Laterality: N/A;  Pre: Anemia;  Post: Diverticulosis;  Norberto Candelario MD    ENDOSCOPY N/A 05/09/2023    Procedure: ESOPHAGOGASTRODUODENOSCOPY WITH ANESTHESIA;  Surgeon: Fly Seymour DO;  Location: [facility] ENDOSCOPY;  Service: Gastroenterology;  Laterality: N/A;  Pre:  Anemia  Post: Duodenitis, Gastritis  Norberto Candelario MD    JOINT REPLACEMENT Bilateral     hip    SKIN CANCER EXCISION Right 10/2023    TOTAL SHOULDER ARTHROPLASTY W/ DISTAL CLAVICLE EXCISION Left 01/03/2023    Procedure: LEFT REVERSE TOTAL SHOULDER ARTHROPLASTY;  Surgeon: Callum Hernandez MD;  Location: [facility];  Service: Orthopedics;  Laterality: Left;     Social History:  reports that he quit smoking about 19 years ago. His smoking use included cigarettes. He has been exposed to tobacco smoke. He has never used smokeless tobacco. He reports current alcohol use of about 11.0 standard drinks of alcohol per week. He reports that he does not use drugs.    Family History: family history includes Cancer in his mother; Colon cancer in his brother; Heart disease in his father; Heart failure in his father.       Allergies:  No Known Allergies    Medications:  Prior to Admission medications    Medication Sig Start Date End Date Taking? Authorizing Provider   albuterol sulfate  (90 Base) MCG/ACT inhaler Inhale 2 puffs 4 (Four) Times a Day.    Julianne Shen MD   allopurinol (ZYLOPRIM) 300 MG tablet Take 1 tablet by mouth Every Morning.    Julianne Shen MD   aspirin 81 MG EC tablet Take 1 tablet by mouth Daily. 9/13/23   Misty Castillo APRN   atorvastatin (LIPITOR) 40 MG tablet Take 0.5 tablets by mouth Every Night.    Julianne Shen MD   empagliflozin (JARDIANCE) 10 MG tablet tablet Take 1 tablet by mouth Daily.  Patient taking differently: Take 2.5 tablets by mouth Daily. Takes 1/2 tablet daily 9/21/23   Norberto Candelario MD   ferrous sulfate 325 (65 FE) MG tablet Take 1 tablet by mouth Daily With Breakfast.    Julianne Shen MD   furosemide (LASIX) 20 MG tablet Take 1 tablet by mouth Every Morning.    Julianne Shen MD   metoprolol succinate XL (TOPROL-XL) 100 MG 24 hr tablet Take 0.5 tablets by mouth Every Morning.    Julianne Shen MD   multivitamin with  "minerals tablet tablet Take 1 tablet by mouth Every Morning.    Julianne Shen MD   pantoprazole (PROTONIX) 40 MG EC tablet Take 1 tablet by mouth Every Morning.    Julianne Shen MD   potassium chloride (MICRO-K) 10 MEQ CR capsule Take 1 capsule by mouth Daily. 9/23/24   Norberto Candelario MD   spironolactone (ALDACTONE) 25 MG tablet Take 1 tablet by mouth Every Morning.    Julianne Shen MD   tamsulosin (FLOMAX) 0.4 MG capsule 24 hr capsule Take 1 capsule by mouth Every Evening.    Julianne Shen MD     I have utilized all available immediate resources to obtain, update, or review the patient's current medications (including all prescriptions, over-the-counter products, herbals, cannabis/cannabidiol products, and vitamin/mineral/dietary (nutritional) supplements).    Objective     Vital Signs: /72 (BP Location: Left arm, Patient Position: Sitting)   Pulse 120   Temp 98 °F (36.7 °C)   Resp 18   Ht 170.2 cm (67.01\")   Wt 105 kg (232 lb)   SpO2 92%   BMI 36.33 kg/m²   Physical Exam  Constitutional:       General: He is not in acute distress.     Appearance: Normal appearance. He is obese.   HENT:      Head: Normocephalic and atraumatic.      Right Ear: External ear normal.      Left Ear: External ear normal.      Mouth/Throat:      Mouth: Mucous membranes are moist.      Pharynx: Oropharynx is clear.   Eyes:      General: Scleral icterus present.      Extraocular Movements: Extraocular movements intact.      Pupils: Pupils are equal, round, and reactive to light.   Cardiovascular:      Rate and Rhythm: Tachycardia present. Rhythm irregularly irregular.      Pulses: Normal pulses.      Heart sounds: Normal heart sounds.   Pulmonary:      Effort: Pulmonary effort is normal. No respiratory distress.      Breath sounds: Normal breath sounds.   Abdominal:      General: Abdomen is protuberant.      Palpations: Abdomen is soft. There is no mass.      Tenderness: There is no " abdominal tenderness.      Comments: Pitting edema of the abdominal wall.   Musculoskeletal:         General: Normal range of motion.      Right lower leg: Edema (4/4) present.      Left lower leg: Edema (4/4) present.   Skin:     General: Skin is warm and dry.   Neurological:      General: No focal deficit present.      Mental Status: He is alert and oriented to person, place, and time. Mental status is at baseline.      Cranial Nerves: No cranial nerve deficit.      Comments: No asterixis noted.   Psychiatric:         Mood and Affect: Mood normal.         Judgment: Judgment normal.        Results Reviewed:  Lab Results (last 24 hours)       ** No results found for the last 24 hours. **          Imaging Results (Last 24 Hours)       ** No results found for the last 24 hours. **          I have personally reviewed and interpreted the radiology studies and ECG obtained at time of admission.     Assessment / Plan   Assessment:   Active Hospital Problems    Diagnosis     **Acute alcoholic hepatitis     Anasarca     Alcohol abuse     Cellulitis both upper extremeties     Hyperbilirubinemia     Permanent atrial fibrillation        Treatment Plan  Admit to the medical surgical floor  Lasix drip at 10 mg/h  CMP, CBC, direct bilirubin, ceruloplasmin  Continue the bulk of home medications  Cefazolin 2 g IV every 8 hours x 7 days    Medical Decision Making  Number and Complexity of problems:   5 acute, high complexity conditions    Differential Diagnosis: Arnoldo's disease, hemolytic anemia    Conditions and Status        Condition is unchanged.     [facility] Data  External documents reviewed: Care Everywhere  Cardiac tracing (EKG, telemetry) interpretation: See HPI  Radiology interpretation: See HPI  Labs reviewed: See HPI  Any tests that were considered but not ordered: None     Decision rules/scores evaluated (example [id number], [place], etc): [id number]     Discussed with: The patient     Care Planning  Shared decision  making: The patient and gastroenterology  Code status and discussions: Full code    Disposition  Social Determinants of Health that impact treatment or disposition: None  Estimated length of stay is 4-5 days.     I confirmed that the patient's advanced care plan is present, code status is documented, and a surrogate decision maker is listed in the patient's medical record.     The patient's surrogate decision maker is his wife.     The patient was seen and examined by me on 10/23/2024 at 1830.    Electronically signed by Lv Jean-Bpatiste DO, 10/23/24, 19:03 CDT.

## 2024-10-24 NOTE — CASE MANAGEMENT/SOCIAL WORK
Discharge Planning Assessment  [facility]     Patient Name: Kristian Mir  MRN: [id number]  Today's Date: 10/24/2024    Admit Date: 10/23/2024        Discharge Needs Assessment       Row Name 10/24/24 0951       Living Environment    People in Home spouse    Name(s) of People in Home HAMZAH MIR (Spouse)  [phone] (Mobile)    Current Living Arrangements home    Potentially Unsafe Housing Conditions none    In the past 12 months has the electric, gas, oil, or water company threatened to shut off services in your home? No    Primary Care Provided by self    Provides Primary Care For no one    Family Caregiver if Needed spouse    Family Caregiver Names wife, Hamzah    Quality of Family Relationships helpful;involved;supportive    Able to Return to Prior Arrangements yes       Resource/Environmental Concerns    Resource/Environmental Concerns none    Transportation Concerns none       Transportation Needs    In the past 12 months, has lack of transportation kept you from medical appointments or from getting medications? no    In the past 12 months, has lack of transportation kept you from meetings, work, or from getting things needed for daily living? No       Food Insecurity    Within the past 12 months, you worried that your food would run out before you got the money to buy more. Never true    Within the past 12 months, the food you bought just didn't last and you didn't have money to get more. Never true       Transition Planning    Patient/Family Anticipates Transition to home with family    Patient/Family Anticipated Services at Transition none    Transportation Anticipated family or friend will provide       Discharge Needs Assessment    Equipment Currently Used at Home none    Concerns to be Addressed denies needs/concerns at this time    Do you want help finding or keeping work or a job? I do not need or want help    Do you want help with school or training? For example, starting or completing job  training or getting a high school diploma, GED or equivalent No    Anticipated Changes Related to Illness none    Equipment Needed After Discharge none    Discharge Coordination/Progress Patient lives at home with wife. No DME in use.  Has Medicare coverage and PCP is Dr. Norberto Candelario.  Denies any needs at this time.  CM/SS will follow and assist if any Etoh abuse teaching or information is needed (once pt is medically stable and closer to time of discharge)                    Discharge Plan    No documentation.                 Continued Care and Services - Admitted Since 10/23/2024    No active coordination exists for this encounter.          Demographic Summary    No documentation.                  Functional Status    No documentation.                  Psychosocial    No documentation.                  Abuse/Neglect    No documentation.                  Legal    No documentation.                  Substance Abuse    No documentation.                  Patient Forms    No documentation.                     Renata Reis RN

## 2024-10-24 NOTE — PLAN OF CARE
Goal Outcome Evaluation:  Plan of Care Reviewed With: patient, spouse           Outcome Evaluation: PT eval complete. Pt in recliner upon entry, AOx4 and agreeable to session now that he has clothes and shoes. Today Mr. Mir stood with Spv and verbal cues and tolerated reaching outside ASHLEY to put his room phone onto table. No formal AROM or MMT performed but remains WFL and has no functional deficits. Pt ambulated in hallway 150' with CGA but required 2 short standing rest breaks. Pt reports his activity tolerance is much decreased and he has been limited getting into his truck due to low back pain. Pt left in recliner with sposue at bedside. Pt will benefit from skilled PT services to improve functional mobility to household and community distances, pain control with mobility and returning to PLOF. Pt will likely meet goals quickly and will be d/c from PT services when goals are met. Recommend home with assist and OPPT when medically stable.    Anticipated Discharge Disposition (PT): home with assist, home with outpatient therapy services

## 2024-10-24 NOTE — PLAN OF CARE
Goal Outcome Evaluation:  Plan of Care Reviewed With: patient        Progress: improving  Outcome Evaluation: No s/sx of withdrawal, pleasant ,Lasix drip continues. No c/o pain, worked with therapy, encourage to keep up with output. New IV x 2 per VAT team today.

## 2024-10-24 NOTE — CONSULTS
General acute hospital GASTROENTEROLOGY              Initial Inpatient Consult Note  Kristian Mir  1945    Referring Provider: JONG Watts    Admission: 10/23/2024  Consult date: 10/24/2024  Chief complaint: alcohol hepatitis    Subjective     History of present illness:  Pt is a 79 year old male admitted with complaints of swollen red arms bilaterally and yellow color of the eyes. His doctor at [facility] noticed his jaundice yesterday leading him to the ER and he was transferred here in ambulance. Hyperbilirubemia on labs with bili 10.6. ALKP 268, , ALT WNL. WBC WNL. Platelets are 179K. Albumin 3.3 Today his bilirubin is 9.3.  He has a long hx of beer drinking up to 6 per day and over the past 4 months it has been vodka and cranberry 3-4 mixed drinks per day. He denies any known hx for liver disease.   He has seen Dr Seymour prior for endo colon evaluations.   No rectal bleeding. No abdominal pain. No nausea or vomiting. No fever chills or sweats. He has had a 14 lb wt gain. SOB     Past Medical History:  Past Medical History:   Diagnosis Date    Arrhythmia     Arthritis     Asthma     Atrial fibrillation     Cancer     skin    Chronic anticoagulation     Clotting disorder     COPD (chronic obstructive pulmonary disease)     GERD (gastroesophageal reflux disease)     History of transfusion     1992 bleeding ulcers dr gonzalez    Hyperlipidemia     Hypertension     Non-ischemic cardiomyopathy 10/14/2021       Past Surgical History:  Past Surgical History:   Procedure Laterality Date    ATRIAL APPENDAGE EXCLUSION LEFT WITH TRANSESOPHAGEAL ECHOCARDIOGRAM Right 09/12/2023    Procedure: Atrial Appendage Occlusion;  Surgeon: Daniel Clayton MD;  Location: [facility] INVASIVE LOCATION;  Service: Cardiology;  Laterality: Right;    CARDIAC CATHETERIZATION      CARDIOVERSION      10/21 and 11/21 dr dawson richardson    CATARACT EXTRACTION Bilateral     laser juanita richardson    COLONOSCOPY  N/A 04/15/2022    Procedure: COLONOSCOPY WITH ANESTHESIA;  Surgeon: Fly Seymour DO;  Location: [facility] ENDOSCOPY;  Service: Gastroenterology;  Laterality: N/A;  pre anemia, screen  post polyps, diverticulosis  Norberto Hannah MD    COLONOSCOPY N/A 2023    Procedure: COLONOSCOPY WITH ANESTHESIA;  Surgeon: Fly Seymour DO;  Location: [facility] ENDOSCOPY;  Service: Gastroenterology;  Laterality: N/A;  Pre: Anemia;  Post: Diverticulosis;  Norberto Candelario MD    ENDOSCOPY N/A 2023    Procedure: ESOPHAGOGASTRODUODENOSCOPY WITH ANESTHESIA;  Surgeon: Fly Seymour DO;  Location: [facility] ENDOSCOPY;  Service: Gastroenterology;  Laterality: N/A;  Pre: Anemia  Post: Duodenitis, Gastritis  Norberto Candelario MD    JOINT REPLACEMENT Bilateral     hip    SKIN CANCER EXCISION Right 10/2023    TOTAL SHOULDER ARTHROPLASTY W/ DISTAL CLAVICLE EXCISION Left 2023    Procedure: LEFT REVERSE TOTAL SHOULDER ARTHROPLASTY;  Surgeon: Callum Hernandez MD;  Location: [facility] OR;  Service: Orthopedics;  Laterality: Left;       Social History:   Social History     Tobacco Use    Smoking status: Former     Current packs/day: 0.00     Types: Cigarettes     Quit date: 2005     Years since quittin.8     Passive exposure: Past    Smokeless tobacco: Never   Substance Use Topics    Alcohol use: Yes     Alcohol/week: 11.0 standard drinks of alcohol     Types: 3 Cans of beer, 8 Shots of liquor per week     Comment: daily 2-3- vodka cranberry        Family History:  Family History   Problem Relation Age of Onset    Cancer Mother     Heart disease Father     Heart failure Father     Colon cancer Brother     Colon polyps Neg Hx     Esophageal cancer Neg Hx        Home Meds:  Medications Prior to Admission   Medication Sig Dispense Refill Last Dose/Taking    albuterol sulfate  (90 Base) MCG/ACT inhaler Inhale 2 puffs 4 (Four) Times a Day.       allopurinol (ZYLOPRIM) 300 MG tablet Take 1 tablet by  "mouth Every Morning.       aspirin 81 MG EC tablet Take 1 tablet by mouth Daily.       atorvastatin (LIPITOR) 40 MG tablet Take 0.5 tablets by mouth Every Night.       empagliflozin (JARDIANCE) 10 MG tablet tablet Take 1 tablet by mouth Daily. (Patient taking differently: Take 2.5 tablets by mouth Daily. Takes 1/2 tablet daily) 14 tablet 0     ferrous sulfate 325 (65 FE) MG tablet Take 1 tablet by mouth Daily With Breakfast.       furosemide (LASIX) 20 MG tablet Take 1 tablet by mouth Every Morning.       metoprolol succinate XL (TOPROL-XL) 100 MG 24 hr tablet Take 0.5 tablets by mouth Every Morning.       multivitamin with minerals tablet tablet Take 1 tablet by mouth Every Morning.       pantoprazole (PROTONIX) 40 MG EC tablet Take 1 tablet by mouth Every Morning.       potassium chloride (MICRO-K) 10 MEQ CR capsule Take 1 capsule by mouth Daily. 30 capsule 5     spironolactone (ALDACTONE) 25 MG tablet Take 1 tablet by mouth Every Morning.       tamsulosin (FLOMAX) 0.4 MG capsule 24 hr capsule Take 1 capsule by mouth Every Evening.          Current Meds:   Hospital Medications (active)         Dose Frequency Start End    acetaminophen (TYLENOL) 160 MG/5ML oral solution 650 mg 650 mg Every 4 Hours PRN 10/23/2024 --    Admin Instructions: If given for fever, use fever parameter: fever greater than 100.4 °F  Based on patient request - if ordered for moderate or severe pain, provider allows for administration of a medication prescribed for a lower pain scale.    Do not exceed 4 grams of acetaminophen in a 24 hr period. Max dose of 2gm for AST/ALT greater than 120 units/L.    If given for pain, use the following pain scale:   Mild Pain = Pain Score of 1-3, CPOT 1-2  Moderate Pain = Pain Score of 4-6, CPOT 3-4  Severe Pain = Pain Score of 7-10, CPOT 5-8    Route: Oral    Linked Group 1: Placed in \"Or\" Linked Group        acetaminophen (TYLENOL) suppository 650 mg 650 mg Every 4 Hours PRN 10/23/2024 --    Admin " "Instructions: If given for fever, use fever parameter: fever greater than 100.4 °F  Based on patient request - if ordered for moderate or severe pain, provider allows for administration of a medication prescribed for a lower pain scale.    Do not exceed 4 grams of acetaminophen in a 24 hr period. Max dose of 2gm for AST/ALT greater than 120 units/L.    If given for pain, use the following pain scale:   Mild Pain = Pain Score of 1-3, CPOT 1-2  Moderate Pain = Pain Score of 4-6, CPOT 3-4  Severe Pain = Pain Score of 7-10, CPOT 5-8    Route: Rectal    Linked Group 1: Placed in \"Or\" Linked Group        acetaminophen (TYLENOL) tablet 650 mg 650 mg Every 4 Hours PRN 10/23/2024 --    Admin Instructions: If given for fever, use fever parameter: fever greater than 100.4 °F  Based on patient request - if ordered for moderate or severe pain, provider allows for administration of a medication prescribed for a lower pain scale.    Do not exceed 4 grams of acetaminophen in a 24 hr period. Max dose of 2gm for AST/ALT greater than 120 units/L.    If given for pain, use the following pain scale:   Mild Pain = Pain Score of 1-3, CPOT 1-2  Moderate Pain = Pain Score of 4-6, CPOT 3-4  Severe Pain = Pain Score of 7-10, CPOT 5-8    Route: Oral    Linked Group 1: Placed in \"Or\" Linked Group        allopurinol (ZYLOPRIM) tablet 300 mg 300 mg Every Morning 10/24/2024 --    Admin Instructions: ([facility]KC) Take with food if GI upset occurs.    Route: Oral    aluminum-magnesium hydroxide-simethicone (MAALOX MAX) 400-400-40 MG/5ML suspension 15 mL 15 mL Every 6 Hours PRN 10/23/2024 --    Admin Instructions: Maximum 60 mL in 24 hours.    Route: Oral    aspirin EC tablet 81 mg 81 mg Daily 10/23/2024 --    Admin Instructions: Do not crush or chew the capsules or tablets. The drug may not work as designed if the capsule or tablet is crushed or chewed. Swallow whole.  Do not exceed 4 grams of aspirin in a 24 hr period.    If given for pain, use the " "following pain scale:   Mild Pain = Pain Score of 1-3, CPOT 1-2  Moderate Pain = Pain Score of 4-6, CPOT 3-4  Severe Pain = Pain Score of 7-10, CPOT 5-8    Route: Oral    atorvastatin (LIPITOR) tablet 20 mg 20 mg Nightly 10/23/2024 --    Admin Instructions: Avoid grapefruit juice.    Route: Oral    bisacodyl (DULCOLAX) EC tablet 5 mg 5 mg Daily PRN 10/23/2024 --    Admin Instructions: Use if no bowel movement after 12 hours.  Swallow whole. Do not crush, split, or chew tablet.    Route: Oral    Linked Group 2: Placed in \"And\" Linked Group        bisacodyl (DULCOLAX) suppository 10 mg 10 mg Daily PRN 10/23/2024 --    Admin Instructions: Use if no bowel movement after 12 hours.  Hold for diarrhea    Route: Rectal    Linked Group 2: Placed in \"And\" Linked Group        Calcium Replacement - Follow Nurse / BPA Driven Protocol  As Needed 10/23/2024 --    Admin Instructions: Open Order & Select \"BHS Electrolyte Replacement Protocol Algorithm\" to View Details    Route: Does not apply    ceFAZolin 2000 mg IVPB in 100 mL NS (MBP) 2,000 mg Every 8 Hours 10/23/2024 10/30/2024    Admin Instructions: Caution: Look alike/sound alike drug alert    Route: Intravenous    empagliflozin (JARDIANCE) tablet 10 mg 10 mg Daily 10/23/2024 --    Route: Oral    ferrous sulfate tablet 325 mg 325 mg Daily With Breakfast 10/24/2024 --    Admin Instructions: Swallow whole. Do not crush, split, or chew. Take with food if GI upset occurs.    Route: Oral    folic acid (FOLVITE) tablet 1 mg 1 mg Daily 10/23/2024 --    Route: Oral    furosemide (LASIX) 500 mg in 50mL infusion 10 mg/hr Continuous 10/23/2024 --    Admin Instructions: FOR CONTINUOUS INFUSION ONLY.  NOT FOR BOLUS ADMINISTRATION.    Notes to Pharmacy: Product must be prepared by using Compounding and Repackaging.    Route: Intravenous    LORazepam (ATIVAN) injection 1 mg 1 mg Every 1 Hour PRN 10/23/2024 10/28/2024    Admin Instructions: Reassess 1 Hour After Administration   Caution: Look " "alike/sound alike drug alert. Dilute 1:1 with normal saline.    Route: Intravenous    Linked Group 3: Placed in \"Or\" Linked Group        LORazepam (ATIVAN) injection 2 mg 2 mg Every 1 Hour PRN 10/23/2024 10/28/2024    Admin Instructions: Reassess 1 Hour After Administration   Caution: Look alike/sound alike drug alert. Dilute 1:1 with normal saline.    Route: Intravenous    Linked Group 3: Placed in \"Or\" Linked Group        LORazepam (ATIVAN) injection 2 mg 2 mg Every 15 Minutes PRN 10/23/2024 10/28/2024    Admin Instructions: Reassess 15 Minutes After Each Administration.  If CIWA-Ar Does Not Decrease Contact Provider To Discuss Transfer to Higher Level of Care.   Caution: Look alike/sound alike drug alert. Dilute 1:1 with normal saline.    Route: Intravenous    Linked Group 3: Placed in \"Or\" Linked Group        LORazepam (ATIVAN) injection 2 mg 2 mg Every 15 Minutes PRN 10/23/2024 10/28/2024    Admin Instructions: Reassess 15 Minutes After Each Administration.  If CIWA-Ar Does Not Decrease Contact Provider To Discuss Transfer to Higher Level of Care.   Caution: Look alike/sound alike drug alert. Dilute 1:1 with normal saline.    Route: Intramuscular    Linked Group 3: Placed in \"Or\" Linked Group        LORazepam (ATIVAN) tablet 1 mg 1 mg Every 1 Hour PRN 10/23/2024 10/28/2024    Admin Instructions: Reassess 1 Hour After Administration   Caution: Look alike/sound alike drug alert    Route: Oral    Linked Group 3: Placed in \"Or\" Linked Group        LORazepam (ATIVAN) tablet 2 mg 2 mg Every 1 Hour PRN 10/23/2024 10/28/2024    Admin Instructions: Reassess 1 Hour After Administration.  Caution: Look alike/sound alike drug alert    Route: Oral    Linked Group 3: Placed in \"Or\" Linked Group        Magnesium Standard Dose Replacement - Follow Nurse / BPA Driven Protocol  As Needed 10/23/2024 --    Admin Instructions: Open Order & Select \"BHS Electrolyte Replacement Protocol Algorithm\" to View Details    Route: Does not " "apply    metoprolol succinate XL (TOPROL-XL) 24 hr tablet 50 mg 50 mg Every Morning 10/23/2024 --    Admin Instructions: Hold for SBP less than 100, DBP less than 60, or heart rate less than 50    Do not crush or chew the capsules or tablets. The drug may not work as designed if the capsule or tablet is crushed or chewed. Swallow whole.  Do not crush or chew.    Route: Oral    ondansetron (ZOFRAN) injection 4 mg 4 mg Every 6 Hours PRN 10/23/2024 --    Admin Instructions: If BOTH ondansetron (ZOFRAN) and promethazine (PHENERGAN) are ordered use ondansetron first and THEN promethazine IF ondansetron is ineffective.    Route: Intravenous    pantoprazole (PROTONIX) EC tablet 40 mg 40 mg Every Early Morning 10/24/2024 --    Admin Instructions: Do not crush or chew the capsules or tablets. The drug may not work as designed if the capsule or tablet is crushed or chewed. Swallow whole.  Swallow whole; do not crush, split, or chew.    Route: Oral    Phosphorus Replacement - Follow Nurse / BPA Driven Protocol  As Needed 10/23/2024 --    Admin Instructions: Open Order & Select \"BHS Electrolyte Replacement Protocol Algorithm\" to View Details    Route: Does not apply    polyethylene glycol (MIRALAX) packet 17 g 17 g Daily PRN 10/23/2024 --    Admin Instructions: Use if no bowel movement after 12 hours. Mix in 6-8 ounces of water.  Use 4-8 ounces of water, tea, or juice for each 17 gram dose.    Route: Oral    Linked Group 2: Placed in \"And\" Linked Group        potassium chloride (MICRO-K/KLOR-CON) CR capsule 10 mEq Daily 10/23/2024 --    Admin Instructions: Do not crush or chew the capsules or tablets. The drug may not work as designed if the capsule or tablet is crushed or chewed. Swallow whole.  Take with food.    Route: Oral    Potassium Replacement - Follow Nurse / BPA Driven Protocol  As Needed 10/23/2024 --    Admin Instructions: Open Order & Select \"BHS Electrolyte Replacement Protocol Algorithm\" to View Details    Route: " "Does not apply    sennosides-docusate (PERICOLACE) 8.6-50 MG per tablet 2 tablet 2 tablet 2 Times Daily PRN 10/23/2024 --    Admin Instructions: Start bowel management regimen if patient has not had a bowel movement after 12 hours.    Route: Oral    Linked Group 2: Placed in \"And\" Linked Group        sodium chloride 0.9 % flush 10 mL 10 mL Every 12 Hours Scheduled 10/23/2024 --    Route: Intravenous    sodium chloride 0.9 % flush 10 mL 10 mL As Needed 10/23/2024 --    Route: Intravenous    sodium chloride 0.9 % infusion 40 mL 40 mL As Needed 10/23/2024 1/30/2025    Admin Instructions: Following administration of an IV intermittent medication, flush line with 40mL NS at 100mL/hr.    Route: Intravenous    spironolactone (ALDACTONE) tablet 25 mg 25 mg Every Early Morning 10/24/2024 --    Admin Instructions: Hold for SBP less than 100, DBP less than 60.  Group 1 (Yellow) Hazardous Drug - See Handling Guide    Route: Oral    tamsulosin (FLOMAX) 24 hr capsule 0.4 mg 0.4 mg Every Evening 10/23/2024 --    Admin Instructions: Do not crush or chew the capsules or tablets. The drug may not work as designed if the capsule or tablet is crushed or chewed. Swallow whole. If patient unable to swallow whole, contact pharmacy for alternative.    Route: Oral    thiamine (B-1) injection 200 mg 200 mg Every 8 Hours Scheduled 10/23/2024 10/28/2024    Admin Instructions: Doses of up to 250mg, give over 1-2 minutes (IV push). Doses 250mg and above, give over 30 minutes.    Route: Intravenous    Linked Group 4: Placed in \"Followed by\" Linked Group        thiamine (VITAMIN B-1) tablet 100 mg 100 mg Daily 10/29/2024 --    Route: Oral    Linked Group 4: Placed in \"Followed by\" Linked Group                Allergies:  No Known Allergies    Review of Systems  Review of Systems   Constitutional:  Positive for fatigue. Negative for activity change, appetite change, chills, diaphoresis, fever and unexpected weight change.   HENT:  Negative for ear " pain, hearing loss, mouth sores, sore throat, trouble swallowing and voice change.    Eyes: Negative.         Yellow eyes   Respiratory:  Negative for cough, choking, shortness of breath and wheezing.    Cardiovascular:  Negative for chest pain and palpitations.   Gastrointestinal:  Negative for abdominal pain, blood in stool, constipation, diarrhea, nausea and vomiting.   Endocrine: Negative for cold intolerance and heat intolerance.   Genitourinary:  Negative for decreased urine volume, dysuria, frequency, hematuria and urgency.   Musculoskeletal:  Negative for back pain, gait problem and myalgias.   Skin:  Positive for color change. Negative for pallor and rash.        Redness and swelling of upper extremeties   Allergic/Immunologic: Negative for food allergies and immunocompromised state.   Neurological:  Positive for weakness. Negative for dizziness, tremors, seizures, syncope, light-headedness, numbness and headaches.   Hematological:  Negative for adenopathy. Does not bruise/bleed easily.   Psychiatric/Behavioral:  Negative for agitation and confusion. The patient is not nervous/anxious.    All other systems reviewed and are negative.       Objective     Vital Signs  Temp:  [98 °F (36.7 °C)-98.3 °F (36.8 °C)] 98 °F (36.7 °C)  Heart Rate:  [110-125] 110  Resp:  [18] 18  BP: ()/(53-72) 95/57  Body mass index is 36.3 kg/m².    Physical Exam:  General Appearance:    Alert, cooperative, in no acute distress   Head:    Normocephalic, without obvious abnormality, atraumatic   Eyes:            Lids and lashes normal, conjunctivae and sclerae normal, positive icterus, conjunctival pallor   Throat:   No oral lesions, no thrush, oral mucosa moist, posterior oropharynx clear   Neck:   No adenopathy, supple, trachea midline, no thyromegaly, no   carotid bruit, no JVD   Lungs:     Clear to auscultation,respirations regular, even and            unlabored    Heart:    Regular rhythm and normal rate, normal S1 and S2,            no   murmur   Chest Wall:    No abnormalities observed   Abdomen:     Normal bowel sounds, no masses, no organomegaly,     soft nontender, nondistended, no guarding, no rebound      tenderness   Rectal:     Deferred   Extremities:   no edema, no cyanosis   Skin:   No open lesions, redness arms bilaterally   Lymph nodes:   No palpable cervical adenopathy   Psychiatric:  Judgment and insight: normal   Orientation to person place and time: normal   Mood and affect: normal     Results Review:  Results from last 7 days   Lab Units 10/23/24  1917   WBC 10*3/mm3 7.56   HEMOGLOBIN g/dL 12.0*   HEMATOCRIT % 34.3*   PLATELETS 10*3/mm3 179       Results from last 7 days   Lab Units 10/24/24  0810 10/23/24  1917   SODIUM mmol/L 133* 134*   POTASSIUM mmol/L 4.9 3.5   CHLORIDE mmol/L 93* 90*   CO2 mmol/L 28.0 32.0*   BUN mg/dL 5* 4*   CREATININE mg/dL 0.54* 0.53*   CALCIUM mg/dL 7.8* 8.0*   BILIRUBIN mg/dL 9.3* 10.6*   ALK PHOS U/L 237* 268*   ALT (SGPT) U/L 40 48*   AST (SGOT) U/L 129* 136*   GLUCOSE mg/dL 121* 125*       Results from last 7 days   Lab Units 10/23/24  1917   INR  1.62*        Radiology Review:  Imaging Results (Last 72 Hours)       ** No results found for the last 72 hours. **            Assessment & Plan         Acute alcoholic hepatitis    Permanent atrial fibrillation    Presence of Watchman left atrial appendage closure device    Anasarca    Alcohol abuse    Cellulitis both upper extremeties    Hyperbilirubinemia    Chronic atrial fibrillation      ETOH hepatitis  Hyperbilirubinemia  Cellulitis upper extremities bilaterally  Anasarca  Icteric sclera  Bilirubin slowly trending down, getting lasix gtt and Aldactone, diuresing well.   Cefazolin   Discriminant Function 34.1 anything greater than 32 patient may benefit from steroid therapy however will wait at this time due to cellulitis and improvement in bilirubin. Patient is stable. Will follow.     I have requested ultrasound and CT reports to chart  from [facility] for review. He will need workup for underlying liver disease, cirrhosis as outpatient with Dr Seymour/Kaz. He is aware and agrees.       EMR Dragon/transcription disclaimer: Much of this encounter note is electronic transcription/translation of spoken language to printed text. The electronic translation of spoken language may be erroneous, or at times, nonsensical words or phrases may be inadvertently transcribed. Although I have reviewed the note for such errors, some may still exist.  JONG Marie  [facility]astroenter[facility]  10/24/24  09:32 CDT

## 2024-10-24 NOTE — NURSING NOTE
"Midlevel provider from GI called to speak with nurse asking if US reports were available from transferring facility. I found an imaging CD in chart, informed her of this, she states she \"needs a report, or she will have to start repeating tests.\" I asked her to wait momentarily for me to grab the rest of the paperwork from transfer and I would check for a report, the call was disconnected at this time. Attempts to call her back were unsuccessful, message left with office, no return call ever made. Records from [place] Faxed to HIM, and medical records called and asked to fax over all imaging reports they had available from his visit. Never received fax. Ultrasound was reordered, pt was not NPO, they state keep npo and they will do tomorrow.   "

## 2024-10-24 NOTE — THERAPY EVALUATION
Patient Name: Kristian Mir  : 1945    MRN: [id number]                              Today's Date: 10/24/2024       Admit Date: 10/23/2024    Visit Dx:     ICD-10-CM ICD-9-CM   1. Impaired mobility [Z74.09]  Z74.09 799.89     Patient Active Problem List   Diagnosis    Permanent atrial fibrillation    Hypertension    Non-ischemic cardiomyopathy    Encounter for screening for malignant neoplasm of colon    Macrocytic anemia    Primary osteoarthritis of left shoulder    H/O gastric ulcer    Alcohol use    Gastritis and duodenitis, chronic    Skin abrasion    Skin tear of right elbow without complication    Venous insufficiency    Iron deficiency anemia    Chronic blood loss anemia    Chronic diastolic (congestive) heart failure    Chronic systolic heart failure    Presence of Watchman left atrial appendage closure device    Acute alcoholic hepatitis    Anasarca    Alcohol abuse    Cellulitis both upper extremeties    Hyperbilirubinemia    Chronic atrial fibrillation     Past Medical History:   Diagnosis Date    Arrhythmia     Arthritis     Asthma     Atrial fibrillation     Cancer     skin    Chronic anticoagulation     Clotting disorder     COPD (chronic obstructive pulmonary disease)     GERD (gastroesophageal reflux disease)     History of transfusion      bleeding ulcers dr gonzalez    Hyperlipidemia     Hypertension     Non-ischemic cardiomyopathy 10/14/2021     Past Surgical History:   Procedure Laterality Date    ATRIAL APPENDAGE EXCLUSION LEFT WITH TRANSESOPHAGEAL ECHOCARDIOGRAM Right 2023    Procedure: Atrial Appendage Occlusion;  Surgeon: Daniel Clayton MD;  Location: [facility] INVASIVE LOCATION;  Service: Cardiology;  Laterality: Right;    CARDIAC CATHETERIZATION      CARDIOVERSION      10/21 and  dr dawson richardson    CATARACT EXTRACTION Bilateral     laser juanita richardson    COLONOSCOPY N/A 04/15/2022    Procedure: COLONOSCOPY WITH ANESTHESIA;  Surgeon: Fly Seymour,  ;  Location: [facility] ENDOSCOPY;  Service: Gastroenterology;  Laterality: N/A;  pre anemia, screen  post polyps, diverticulosis  Norberto Hannah MD    COLONOSCOPY N/A 06/30/2023    Procedure: COLONOSCOPY WITH ANESTHESIA;  Surgeon: Fly Seymour DO;  Location: [facility] ENDOSCOPY;  Service: Gastroenterology;  Laterality: N/A;  Pre: Anemia;  Post: Diverticulosis;  Norberto Candelario MD    ENDOSCOPY N/A 05/09/2023    Procedure: ESOPHAGOGASTRODUODENOSCOPY WITH ANESTHESIA;  Surgeon: Fly Seymour DO;  Location: [facility] ENDOSCOPY;  Service: Gastroenterology;  Laterality: N/A;  Pre: Anemia  Post: Duodenitis, Gastritis  Norberto Candelario MD    JOINT REPLACEMENT Bilateral     hip    SKIN CANCER EXCISION Right 10/2023    TOTAL SHOULDER ARTHROPLASTY W/ DISTAL CLAVICLE EXCISION Left 01/03/2023    Procedure: LEFT REVERSE TOTAL SHOULDER ARTHROPLASTY;  Surgeon: Callum Hernandez MD;  Location: [facility] OR;  Service: Orthopedics;  Laterality: Left;      General Information       Row Name 10/24/24 1047          Physical Therapy Time and Intention    Document Type evaluation  presents with worsening redness in UE and yellowing eyes Dx; Acute alcohol hepatitis  -AJ     Mode of Treatment physical therapy  -AJ       Row Name 10/24/24 1047          General Information    Patient Profile Reviewed yes  -AJ     Prior Level of Function independent:;all household mobility;community mobility;home management;gait;ADL's  -AJ     Existing Precautions/Restrictions fall  -AJ     Barriers to Rehab medically complex  -AJ       Row Name 10/24/24 1047          Living Environment    People in Home spouse  -AJ       Row Name 10/24/24 1047          Home Main Entrance    Number of Stairs, Main Entrance three  -AJ     Stair Railings, Main Entrance none  -AJ       Row Name 10/24/24 1047          Stairs Within Home, Primary    Number of Stairs, Within Home, Primary none  -AJ       Row Name 10/24/24 1047          Cognition    Orientation  Status (Cognition) oriented x 4  -       Row Name 10/24/24 1047          Safety Issues/Impairments Affecting Functional Mobility    Impairments Affecting Function (Mobility) endurance/activity tolerance  -               User Key  (r) = Recorded By, (t) = Taken By, (c) = Cosigned By      Initials Name Provider Type    Rodger Monique PT DPT Physical Therapist                   Mobility       Row Name 10/24/24 1047          Bed Mobility    Comment, (Bed Mobility) in recliner with spouse at bedside  -       Row Name 10/24/24 1047          Bed-Chair Transfer    Bed-Chair Christian (Transfers) standby assist;supervision  -       Row Name 10/24/24 1047          Sit-Stand Transfer    Sit-Stand Christian (Transfers) standby assist  -       Row Name 10/24/24 1047          Gait/Stairs (Locomotion)    Christian Level (Gait) contact guard  -     Patient was able to Ambulate yes  -AJ     Distance in Feet (Gait) 150  -     Deviations/Abnormal Patterns (Gait) bilateral deviations  -     Bilateral Gait Deviations forward flexed posture  -               User Key  (r) = Recorded By, (t) = Taken By, (c) = Cosigned By      Initials Name Provider Type    Rodger Monique PT DPT Physical Therapist                   Obj/Interventions       Row Name 10/24/24 1047          Range of Motion Comprehensive    General Range of Motion no range of motion deficits identified  -     Comment, General Range of Motion Pt stood from recliner as PT entered room, remains WFL and no functional deficits in AROM currently  -       Row Name 10/24/24 1047          Strength Comprehensive (MMT)    General Manual Muscle Testing (MMT) Assessment no strength deficits identified  -     Comment, General Manual Muscle Testing (MMT) Assessment Pt stood from recliner as PT entered room, remains WFL and no functional deficits in MMTcurrently  -       Row Name 10/24/24 1047          Balance    Balance Assessment sitting static  balance;sitting dynamic balance;standing static balance;standing dynamic balance  -AJ     Static Sitting Balance contact guard  -AJ     Dynamic Sitting Balance contact guard  -AJ     Position, Sitting Balance unsupported  -AJ     Static Standing Balance contact guard  -AJ     Dynamic Standing Balance contact guard;minimal assist  -AJ     Position/Device Used, Standing Balance supported;unsupported  -AJ     Balance Interventions sitting;standing;supported;sit to stand;static;dynamic  -AJ       Row Name 10/24/24 1047          Sensory Assessment (Somatosensory)    Sensory Assessment (Somatosensory) sensation intact  -AJ               User Key  (r) = Recorded By, (t) = Taken By, (c) = Cosigned By      Initials Name Provider Type    Rodger Monique, PT DPT Physical Therapist                   Goals/Plan       Row Name 10/24/24 1047          Bed Mobility Goal 1 (PT)    Activity/Assistive Device (Bed Mobility Goal 1, PT) bed mobility activities, all;rolling to left;rolling to right;supine to sit;sit to supine  -AJ     Sutton Level/Cues Needed (Bed Mobility Goal 1, PT) supervision required  -AJ     Time Frame (Bed Mobility Goal 1, PT) long term goal (LTG);10 days  -AJ     Progress/Outcomes (Bed Mobility Goal 1, PT) new goal  -AJ       Row Name 10/24/24 1047          Transfer Goal 1 (PT)    Activity/Assistive Device (Transfer Goal 1, PT) sit-to-stand/stand-to-sit;bed-to-chair/chair-to-bed;toilet;car transfer;walk-in shower  -AJ     Sutton Level/Cues Needed (Transfer Goal 1, PT) independent  -AJ     Time Frame (Transfer Goal 1, PT) long term goal (LTG);10 days  -AJ     Strategies/Barriers (Transfers Goal 1, PT) pain under 3/10 in low back  -AJ     Progress/Outcome (Transfer Goal 1, PT) new goal  -AJ       Row Name 10/24/24 1047          Gait Training Goal 1 (PT)    Activity/Assistive Device (Gait Training Goal 1, PT) gait (walking locomotion);decrease asymmetrical patterns;decrease fall risk;diminish gait  deviation;forward stepping;improve balance and speed;increase endurance/gait distance;increase energy conservation   -AJ     Newcomb Level (Gait Training Goal 1, PT) standby assist  -AJ     Distance (Gait Training Goal 1, PT) 350' as pt reports he wants to walk farther at home   -AJ     Time Frame (Gait Training Goal 1, PT) long term goal (LTG);10 days  -AJ     Progress/Outcome (Gait Training Goal 1, PT) new goal  -AJ       Row Name 10/24/24 1047          Stairs Goal 1 (PT)    Activity/Assistive Device (Stairs Goal 1, PT) ascending stairs;descending stairs;step-to-step;decrease fall risk;improve balance and speed  -AJ     Newcomb Level/Cues Needed (Stairs Goal 1, PT) independent  -AJ     Number of Stairs (Stairs Goal 1, PT) 3 small steps for home safety  -AJ     Time Frame (Stairs Goal 1, PT) long term goal (LTG);10 days  -AJ     Progress/Outcome (Stairs Goal 1, PT) new goal  -AJ       Row Name 10/24/24 1040          Patient Education Goal (PT)    Activity (Patient Education Goal, PT) Pt will demo independence with HEP as needed for returning to PLOF  -AJ     Newcomb/Cues/Accuracy (Memory Goal 2, PT) demonstrates adequately;independent;verbalizes understanding  -AJ     Time Frame (Patient Education Goal, PT) long term goal (LTG);10 days  -AJ     Progress/Outcome (Patient Education Goal, PT) new goal  -AJ       Row Name 10/24/24 1661          Therapy Assessment/Plan (PT)    Planned Therapy Interventions (PT) balance training;bed mobility training;gait training;home exercise program;postural re-education;patient/family education;transfer training;stretching;strengthening;stair training;ROM (range of motion);neuromuscular re-education  -AJ               User Key  (r) = Recorded By, (t) = Taken By, (c) = Cosigned By      Initials Name Provider Type    Rodger Monique, PT DPT Physical Therapist                   Clinical Impression       Row Name 10/24/24 5459          Pain    Pretreatment Pain Rating  0/10 - no pain  -AJ     Posttreatment Pain Rating 0/10 - no pain  -AJ     Pain Management Interventions nursing notified  -     Response to Pain Interventions activity level improved;functional ability improved  -       Row Name 10/24/24 1047          Plan of Care Review    Plan of Care Reviewed With patient;spouse  -     Outcome Evaluation PT eval complete. Pt in recliner upon entry, AOx4 and agreeable to session now that he has clothes and shoes. Today Mr. Mir stood with Spv and verbal cues and tolerated reaching outside ASHLEY to put his room phone onto table. No formal AROM or MMT performed but remains WFL and has no functional deficits. Pt ambulated in hallway 150' with CGA but required 2 short standing rest breaks. Pt reports his activity tolerance is much decreased and he has been limited getting into his truck due to low back pain. Pt left in recliner with sposue at bedside. Pt will benefit from skilled PT services to improve functional mobility to household and community distances, pain control with mobility and returning to PLOF. Pt will likely meet goals quickly and will be d/c from PT services when goals are met. Recommend home with assist and OPPT when medically stable.  -       Row Name 10/24/24 1047          Therapy Assessment/Plan (PT)    Patient/Family Therapy Goals Statement (PT) none stated  -     Rehab Potential (PT) good  -     Criteria for Skilled Interventions Met (PT) yes;meets criteria;skilled treatment is necessary  -     Therapy Frequency (PT) 2 times/day  -     Predicted Duration of Therapy Intervention (PT) until d/c  -       Row Name 10/24/24 1047          Vital Signs    Pre Patient Position Sitting  -AJ     Intra Patient Position Standing  -AJ     Post Patient Position Sitting  -       Row Name 10/24/24 1047          Positioning and Restraints    Pre-Treatment Position sitting in chair/recliner  -AJ     Post Treatment Position chair  -AJ     In Chair notified  nsg;reclined;call light within reach;encouraged to call for assist;with family/caregiver  -               User Key  (r) = Recorded By, (t) = Taken By, (c) = Cosigned By      Initials Name Provider Type    Rodger Monique PT DPT Physical Therapist                   Outcome Measures       Row Name 10/24/24 1047          How much help from another person do you currently need...    Turning from your back to your side while in flat bed without using bedrails? 4  -AJ     Moving from lying on back to sitting on the side of a flat bed without bedrails? 4  -AJ     Moving to and from a bed to a chair (including a wheelchair)? 3  -AJ     Standing up from a chair using your arms (e.g., wheelchair, bedside chair)? 3  -AJ     Climbing 3-5 steps with a railing? 3  -AJ     To walk in hospital room? 4  -AJ     AM-PAC 6 Clicks Score (PT) 21  -AJ     Highest Level of Mobility Goal 6 --> Walk 10 steps or more  -TALIA       Row Name 10/24/24 1047          Functional Assessment    Outcome Measure Options AM-PAC 6 Clicks Basic Mobility (PT)  -               User Key  (r) = Recorded By, (t) = Taken By, (c) = Cosigned By      Initials Name Provider Type    Rodger Monique PT DPT Physical Therapist                                 Physical Therapy Education       Title: PT OT SLP Therapies (Done)       Topic: Physical Therapy (Done)       Point: Mobility training (Done)       Learning Progress Summary            Patient Acceptance, E, VU by TALIA at 10/24/2024 1139    Comment: role of skilled PT, low fall risk, beginning HEP                      Point: Home exercise program (Done)       Learning Progress Summary            Patient Acceptance, E, VU by TALIA at 10/24/2024 1139    Comment: role of skilled PT, low fall risk, beginning HEP                      Point: Body mechanics (Done)       Learning Progress Summary            Patient Acceptance, E, VU by TALIA at 10/24/2024 1139    Comment: role of skilled PT, low fall risk, beginning HEP                       Point: Precautions (Done)       Learning Progress Summary            Patient Acceptance, E, VU by TALIA at 10/24/2024 0695    Comment: role of skilled PT, low fall risk, beginning HEP                                      User Key       Initials Effective Dates Name Provider Type Discipline    TALIA 08/15/24 -  Rodger Raines, PT DPT Physical Therapist PT                  PT Recommendation and Plan  Planned Therapy Interventions (PT): balance training, bed mobility training, gait training, home exercise program, postural re-education, patient/family education, transfer training, stretching, strengthening, stair training, ROM (range of motion), neuromuscular re-education  Outcome Evaluation: PT eval complete. Pt in recliner upon entry, AOx4 and agreeable to session now that he has clothes and shoes. Today Mr. Mir stood with Spv and verbal cues and tolerated reaching outside ASHLEY to put his room phone onto table. No formal AROM or MMT performed but remains WFL and has no functional deficits. Pt ambulated in hallway 150' with CGA but required 2 short standing rest breaks. Pt reports his activity tolerance is much decreased and he has been limited getting into his truck due to low back pain. Pt left in recliner with sposue at bedside. Pt will benefit from skilled PT services to improve functional mobility to household and community distances, pain control with mobility and returning to PLOF. Pt will likely meet goals quickly and will be d/c from PT services when goals are met. Recommend home with assist and OPPT when medically stable.     Time Calculation:         PT Charges       Row Name 10/24/24 1047             Time Calculation    Start Time 1047  -      Stop Time 1108  +10 for chart review and attempted session prior to spouse arrival with clothes  -TALIA      Time Calculation (min) 21 min  -TALIA      PT Received On 10/24/24  -TALIA      PT Goal Re-Cert Due Date 11/03/24  -TALIA         Untimed Charges    PT  Eval/Re-eval Minutes 31  -AJ         Total Minutes    Untimed Charges Total Minutes 31  -AJ       Total Minutes 31  -AJ                User Key  (r) = Recorded By, (t) = Taken By, (c) = Cosigned By      Initials Name Provider Type    Rodger Monique, PT DPT Physical Therapist                  Therapy Charges for Today       Code Description Service Date Service Provider Modifiers Qty    [id number] HC PT EVAL LOW COMPLEXITY 2 10/24/2024 Rodger Raines, PT DPT GP 1            PT G-Codes  Outcome Measure Options: AM-PAC 6 Clicks Basic Mobility (PT)  AM-PAC 6 Clicks Score (PT): 21  PT Discharge Summary  Anticipated Discharge Disposition (PT): home with assist, home with outpatient therapy services    Rodger Raines PT DPT  10/24/2024

## 2024-10-24 NOTE — PROGRESS NOTES
[facility] Medicine Services  INPATIENT PROGRESS NOTE    Patient Name: Kristian Mir  Date of Admission: 10/23/2024  Today's Date: 10/24/24  Length of Stay: 1  Primary Care Physician: Norberto Candelario MD    Subjective   Chief Complaint: Swollen and reddened arms, yellow eyes  HPI     The patient seems to be doing well today.  He is sitting in chair at bedside.  His wife is present with him.  He has been seen and evaluated by gastroenterology.  Recommendation is for conservative treatment.  Maddrey's discriminant function 34.1 per gastroenterology.  Steroid therapy not recommended because of the presence of bilateral upper extremity cellulitis.  Bilirubin has improved from 10.6-9.3.  He remains on a Lasix drip and upper extremity edema is significantly improved.  Abdominal wall edema is slightly improved.  Urine output has not been accurately measured to this point with 2 urinations unaccounted for.  Measured output has been 765 cc.  The patient is adamant that he has had significantly more output than what has been measured.  Acute hepatitis panel, ceruloplasmin and direct Julissa pending.  Sodium is stable today at 133.  ,  and alkaline phosphatase 237.  Renal function is within normal limits.    Review of Systems   All pertinent negatives and positives are as above. All other systems have been reviewed and are negative unless otherwise stated.     Objective    Temp:  [98 °F (36.7 °C)-98.6 °F (37 °C)] 98.6 °F (37 °C)  Heart Rate:  [110-125] 110  Resp:  [18] 18  BP: ()/(53-72) 91/58  Physical Exam    Constitutional:       General: He is not in acute distress.     Appearance: Normal appearance. He is obese.   HENT:      Head: Normocephalic and atraumatic.      Right Ear: External ear normal.      Left Ear: External ear normal.      Mouth: Mucous membranes are moist.      Pharynx: Oropharynx is clear.   Eyes:      General: Scleral icterus present.  "  Cardiovascular:      Rate and Rhythm: Tachycardia present. Rhythm irregularly irregular.      Pulses: Normal pulses.      Heart sounds: Normal heart sounds.   Pulmonary:      Effort: Pulmonary effort is normal. No respiratory distress.      Breath sounds: Normal breath sounds.   Abdominal:      General: Abdomen is protuberant.      Palpations: Abdomen is soft. There is no mass.      Tenderness: There is no abdominal tenderness.      Comments: Pitting edema of the abdominal wall.   Musculoskeletal:         General: Normal range of motion.      Right lower leg: Edema (4/4) present.      Left lower leg: Edema (4/4) present.   Skin:     General: Skin is warm and dry.   Neurological:      General: No focal deficit present.      Mental Status: He is alert and oriented to person, place, and time. Mental status is at baseline.      Comments: No asterixis noted.   Psychiatric:         Mood and Affect: Mood normal.         Judgment: Judgment normal.     Results Review:  I have reviewed the labs, radiology results, and diagnostic studies.    Laboratory Data:   Results from last 7 days   Lab Units 10/23/24  1917   WBC 10*3/mm3 7.56   HEMOGLOBIN g/dL 12.0*   HEMATOCRIT % 34.3*   PLATELETS 10*3/mm3 179        Results from last 7 days   Lab Units 10/24/24  0810 10/23/24  1917   SODIUM mmol/L 133* 134*   POTASSIUM mmol/L 4.9 3.5   CHLORIDE mmol/L 93* 90*   CO2 mmol/L 28.0 32.0*   BUN mg/dL 5* 4*   CREATININE mg/dL 0.54* 0.53*   CALCIUM mg/dL 7.8* 8.0*   BILIRUBIN mg/dL 9.3* 10.6*   ALK PHOS U/L 237* 268*   ALT (SGPT) U/L 40 48*   AST (SGOT) U/L 129* 136*   GLUCOSE mg/dL 121* 125*       Culture Data:   No results found for: \"BLOODCX\", \"URINECX\", \"WOUNDCX\", \"MRSACX\", \"RESPCX\", \"STOOLCX\"    Radiology Data:   Imaging Results (Last 24 Hours)       ** No results found for the last 24 hours. **            I have reviewed the patient's current medications.     Assessment/Plan   Assessment  Active Hospital Problems    Diagnosis     " **Acute alcoholic hepatitis     Anasarca     Alcohol abuse     Cellulitis both upper extremeties     Hyperbilirubinemia     Chronic atrial fibrillation     Presence of Watchman left atrial appendage closure device     Permanent atrial fibrillation        Treatment Plan  Continue conservative treatment  Continue Lasix drip at 10 mg/h  Continue IV cefazolin for cellulitis    Medical Decision Making  Number and Complexity of problems:   5 acute, high complexity conditions     Differential Diagnosis: Arnoldo's disease, hemolytic anemia     Conditions and Status        Condition is unchanged.     MDM Data  External documents reviewed: Care Everywhere  Cardiac tracing (EKG, telemetry) interpretation: See HPI  Radiology interpretation: See HPI  Labs reviewed: See HPI  Any tests that were considered but not ordered: None     Decision rules/scores evaluated (example [id number], Wells, etc): [id number]     Discussed with: The patient     Care Planning  Shared decision making: The patient and gastroenterology  Code status and discussions: Full code     Disposition  Social Determinants of Health that impact treatment or disposition: None  I expect the patient to be discharged to home in 3 days for days.     Electronically signed by Lv Jean-Baptiste DO, 10/24/24, 12:57 CDT.

## 2024-10-24 NOTE — CONSULTS
20 gauge 1.75 inch USGPIV placed in right forearm with 1 attempt(s). 20 gauge 1.88 inch USGPIV placed in right upper arm with 1 attempt(s).       decreased weight-shifting ability

## 2024-10-25 ENCOUNTER — APPOINTMENT (OUTPATIENT)
Dept: ULTRASOUND IMAGING | Facility: HOSPITAL | Age: 79
DRG: 433 | End: 2024-10-25
Payer: MEDICARE

## 2024-10-25 LAB
ALBUMIN SERPL-MCNC: 2.8 G/DL (ref 3.5–5.2)
ALBUMIN/GLOB SERPL: 1.4 G/DL
ALP SERPL-CCNC: 240 U/L (ref 39–117)
ALT SERPL W P-5'-P-CCNC: 23 U/L (ref 1–41)
ANION GAP SERPL CALCULATED.3IONS-SCNC: 14 MMOL/L (ref 5–15)
AST SERPL-CCNC: 119 U/L (ref 1–40)
BILIRUB SERPL-MCNC: 8.5 MG/DL (ref 0–1.2)
BUN SERPL-MCNC: 7 MG/DL (ref 8–23)
BUN/CREAT SERPL: 6.4 (ref 7–25)
CALCIUM SPEC-SCNC: 7.6 MG/DL (ref 8.6–10.5)
CHLORIDE SERPL-SCNC: 91 MMOL/L (ref 98–107)
CO2 SERPL-SCNC: 29 MMOL/L (ref 22–29)
CREAT SERPL-MCNC: 1.09 MG/DL (ref 0.76–1.27)
DAT IGG GEL: POSITIVE
DAT POLY-SP REAG RBC QL: POSITIVE
EGFRCR SERPLBLD CKD-EPI 2021: 69.5 ML/MIN/1.73
GLOBULIN UR ELPH-MCNC: 2 GM/DL
GLUCOSE SERPL-MCNC: 114 MG/DL (ref 65–99)
INR PPP: 2.09 (ref 0.91–1.09)
POTASSIUM SERPL-SCNC: 3.4 MMOL/L (ref 3.5–5.2)
POTASSIUM SERPL-SCNC: 4.2 MMOL/L (ref 3.5–5.2)
PROT SERPL-MCNC: 4.8 G/DL (ref 6–8.5)
PROTHROMBIN TIME: 24.3 SECONDS (ref 11.8–14.8)
SODIUM SERPL-SCNC: 134 MMOL/L (ref 136–145)

## 2024-10-25 PROCEDURE — 25010000002 CEFAZOLIN PER 500 MG: Performed by: FAMILY MEDICINE

## 2024-10-25 PROCEDURE — 25010000002 FUROSEMIDE PER 20 MG: Performed by: FAMILY MEDICINE

## 2024-10-25 PROCEDURE — 25010000002 THIAMINE HCL 200 MG/2ML SOLUTION: Performed by: FAMILY MEDICINE

## 2024-10-25 PROCEDURE — 76705 ECHO EXAM OF ABDOMEN: CPT

## 2024-10-25 PROCEDURE — 85610 PROTHROMBIN TIME: CPT | Performed by: INTERNAL MEDICINE

## 2024-10-25 PROCEDURE — 25010000002 LORAZEPAM PER 2 MG: Performed by: FAMILY MEDICINE

## 2024-10-25 PROCEDURE — 80053 COMPREHEN METABOLIC PANEL: CPT | Performed by: FAMILY MEDICINE

## 2024-10-25 PROCEDURE — 99232 SBSQ HOSP IP/OBS MODERATE 35: CPT | Performed by: INTERNAL MEDICINE

## 2024-10-25 PROCEDURE — 84132 ASSAY OF SERUM POTASSIUM: CPT | Performed by: FAMILY MEDICINE

## 2024-10-25 PROCEDURE — 97530 THERAPEUTIC ACTIVITIES: CPT

## 2024-10-25 PROCEDURE — 97110 THERAPEUTIC EXERCISES: CPT

## 2024-10-25 RX ORDER — POTASSIUM CHLORIDE 750 MG/1
40 CAPSULE, EXTENDED RELEASE ORAL EVERY 4 HOURS
Status: COMPLETED | OUTPATIENT
Start: 2024-10-25 | End: 2024-10-25

## 2024-10-25 RX ADMIN — CEFAZOLIN 2000 MG: 2 INJECTION, POWDER, FOR SOLUTION INTRAMUSCULAR; INTRAVENOUS at 04:18

## 2024-10-25 RX ADMIN — EMPAGLIFLOZIN 10 MG: 10 TABLET, FILM COATED ORAL at 11:09

## 2024-10-25 RX ADMIN — LORAZEPAM 1 MG: 2 INJECTION INTRAMUSCULAR; INTRAVENOUS at 11:15

## 2024-10-25 RX ADMIN — ASPIRIN 81 MG: 81 TABLET, COATED ORAL at 11:12

## 2024-10-25 RX ADMIN — ATORVASTATIN CALCIUM 20 MG: 10 TABLET, FILM COATED ORAL at 20:19

## 2024-10-25 RX ADMIN — THIAMINE HYDROCHLORIDE 200 MG: 100 INJECTION, SOLUTION INTRAMUSCULAR; INTRAVENOUS at 06:23

## 2024-10-25 RX ADMIN — FERROUS SULFATE TAB 325 MG (65 MG ELEMENTAL FE) 325 MG: 325 (65 FE) TAB at 11:10

## 2024-10-25 RX ADMIN — TAMSULOSIN HYDROCHLORIDE 0.4 MG: 0.4 CAPSULE ORAL at 18:20

## 2024-10-25 RX ADMIN — LORAZEPAM 1 MG: 1 TABLET ORAL at 13:15

## 2024-10-25 RX ADMIN — FUROSEMIDE 10 MG/HR: 10 INJECTION, SOLUTION INTRAMUSCULAR; INTRAVENOUS at 06:23

## 2024-10-25 RX ADMIN — POTASSIUM CHLORIDE 40 MEQ: 750 CAPSULE, EXTENDED RELEASE ORAL at 11:13

## 2024-10-25 RX ADMIN — THIAMINE HYDROCHLORIDE 200 MG: 100 INJECTION, SOLUTION INTRAMUSCULAR; INTRAVENOUS at 13:15

## 2024-10-25 RX ADMIN — POTASSIUM CHLORIDE 10 MEQ: 750 CAPSULE, EXTENDED RELEASE ORAL at 11:13

## 2024-10-25 RX ADMIN — THIAMINE HYDROCHLORIDE 200 MG: 100 INJECTION, SOLUTION INTRAMUSCULAR; INTRAVENOUS at 20:19

## 2024-10-25 RX ADMIN — LORAZEPAM 2 MG: 2 INJECTION INTRAMUSCULAR; INTRAVENOUS at 01:38

## 2024-10-25 RX ADMIN — POTASSIUM CHLORIDE 40 MEQ: 750 CAPSULE, EXTENDED RELEASE ORAL at 13:15

## 2024-10-25 RX ADMIN — METOPROLOL SUCCINATE 50 MG: 50 TABLET, EXTENDED RELEASE ORAL at 11:12

## 2024-10-25 RX ADMIN — CEFAZOLIN 2000 MG: 2 INJECTION, POWDER, FOR SOLUTION INTRAMUSCULAR; INTRAVENOUS at 20:20

## 2024-10-25 RX ADMIN — LORAZEPAM 2 MG: 2 INJECTION INTRAMUSCULAR; INTRAVENOUS at 04:17

## 2024-10-25 RX ADMIN — PENTOXIFYLLINE 400 MG: 400 TABLET, EXTENDED RELEASE ORAL at 18:20

## 2024-10-25 RX ADMIN — FOLIC ACID 1 MG: 1 TABLET ORAL at 11:10

## 2024-10-25 RX ADMIN — PENTOXIFYLLINE 400 MG: 400 TABLET, EXTENDED RELEASE ORAL at 11:11

## 2024-10-25 RX ADMIN — Medication 10 ML: at 21:12

## 2024-10-25 RX ADMIN — LORAZEPAM 2 MG: 2 INJECTION INTRAMUSCULAR; INTRAVENOUS at 06:23

## 2024-10-25 RX ADMIN — LORAZEPAM 2 MG: 2 INJECTION INTRAMUSCULAR; INTRAVENOUS at 00:24

## 2024-10-25 RX ADMIN — CEFAZOLIN 2000 MG: 2 INJECTION, POWDER, FOR SOLUTION INTRAMUSCULAR; INTRAVENOUS at 11:09

## 2024-10-25 NOTE — CASE MANAGEMENT/SOCIAL WORK
Continued Stay Note  HARMONY Pimentel     Patient Name: Kristian Mir  MRN: [id number]  Today's Date: 10/25/2024    Admit Date: 10/23/2024    Plan: Home   Discharge Plan       Row Name 10/25/24 1617       Plan    Plan Home    Patient/Family in Agreement with Plan yes    Plan Comments Continuing to follow pt. He lives at home with his spouse and plans to return home at d/c. Will follow in case d/c needs arise.                   Discharge Codes    No documentation.                       GUS Chandler

## 2024-10-25 NOTE — PROGRESS NOTES
[facility] Medicine Services  INPATIENT PROGRESS NOTE    Patient Name: Kristian Mir  Date of Admission: 10/23/2024  Today's Date: 10/25/24  Length of Stay: 2  Primary Care Physician: Norberto Candelario MD    Subjective   Chief Complaint: Elevated bilirubin  HPI     The patient continues to do well.  He is lying in bed.  He is quite somnolent and there is evidence of obstructive sleep apnea with interrupted respirations during periods of sleep.  Bilirubin improved from 9.3-8.5.  He is at 1600 cc measured out in 6 or greater unmeasured urinations so far.  INR is elevated at 2.09.  Potassium level 3.4 and will be replaced per protocol.  Serial CMP will continue.  The patient has had significant response to diuresis with wrinkles noted in both arms and legs today.  There is less body wall edema as well.    Review of Systems   All pertinent negatives and positives are as above. All other systems have been reviewed and are negative unless otherwise stated.     Objective    Temp:  [97.8 °F (36.6 °C)-98.8 °F (37.1 °C)] 97.8 °F (36.6 °C)  Heart Rate:  [] 61  Resp:  [18-22] 20  BP: ()/(55-79) 105/55  Physical Exam    Constitutional:       General: He is not in acute distress.     Appearance: Normal appearance. He is obese.   HENT:      Head: Normocephalic and atraumatic.      Right Ear: External ear normal.      Left Ear: External ear normal.      Mouth: Mucous membranes are moist.      Pharynx: Oropharynx is clear.   Eyes:      General: Scleral icterus present.   Cardiovascular:      Rate and Rhythm: Normal rate.  Rhythm irregularly irregular.      Pulses: Normal pulses.      Heart sounds: Normal heart sounds.   Pulmonary:      Effort: Pulmonary effort is normal. No respiratory distress.      Breath sounds: Normal breath sounds.   Abdominal:      General: Abdomen is protuberant.      Palpations: Abdomen is soft. There is no mass.      Tenderness: There is no abdominal  "tenderness.      Comments: Pitting edema of the abdominal wall significantly improved.   Musculoskeletal:         General: Normal range of motion.      Right lower leg: Edema (3/4) present.      Left lower leg: Edema (3/4) present.   Skin:     General: Skin is warm and dry.   Neurological:      General: No focal deficit present.      Mental Status: He is alert and oriented to person, place, and time. Mental status is at baseline.      Comments: No asterixis noted.   Psychiatric:         Mood and Affect: Mood normal.         Judgment: Judgment normal.     Results Review:  I have reviewed the labs, radiology results, and diagnostic studies.    Laboratory Data:   Results from last 7 days   Lab Units 10/23/24  1917   WBC 10*3/mm3 7.56   HEMOGLOBIN g/dL 12.0*   HEMATOCRIT % 34.3*   PLATELETS 10*3/mm3 179        Results from last 7 days   Lab Units 10/25/24  0723 10/24/24  0810 10/23/24  1917   SODIUM mmol/L 134* 133* 134*   POTASSIUM mmol/L 3.4* 4.9 3.5   CHLORIDE mmol/L 91* 93* 90*   CO2 mmol/L 29.0 28.0 32.0*   BUN mg/dL 7* 5* 4*   CREATININE mg/dL 1.09 0.54* 0.53*   CALCIUM mg/dL 7.6* 7.8* 8.0*   BILIRUBIN mg/dL 8.5* 9.3* 10.6*   ALK PHOS U/L 240* 237* 268*   ALT (SGPT) U/L 23 40 48*   AST (SGOT) U/L 119* 129* 136*   GLUCOSE mg/dL 114* 121* 125*       Culture Data:   No results found for: \"BLOODCX\", \"URINECX\", \"WOUNDCX\", \"MRSACX\", \"RESPCX\", \"STOOLCX\"    Radiology Data:   Imaging Results (Last 24 Hours)       Procedure Component Value Units Date/Time    US Abdomen Limited [[id number]] Collected: 10/25/24 0902     Updated: 10/25/24 0908    Narrative:      US ABDOMEN LIMITED-10/25/2024 7:15 AM     REASON FOR EXAM: elevated liver enzymes, alcoholic hepatitis;  Z74.09-Other reduced mobility       COMPARISON: Abdominal CT dated 6/23/2023     TECHNIQUE: Multiple longitudinal and transverse realtime sonographic  images of the right upper quadrant of the abdomen are obtained.  Ultrasound images and report are permanently stored " in the PACS.     FINDINGS:    Pancreas: Included portions of the pancreatic head, neck and body are  unremarkable. The tail of the pancreas is obscured from view.     Liver: Liver is diffusely echogenic and difficult to penetrate. Deep  portions of the liver are obscured from assessment by beam attenuation.  Liver surface contour appears smooth. Portal veins are patent with  hepatopetal flow. No obvious focal lesion.     Gallbladder: No intraluminal echoes, wall thickening, or pericholecystic  fluid.     Bile ducts: The CBD measures 0.7 cm in diameter. There is no  intrahepatic or extrahepatic ductal dilation.     Right kidney: Normal in size, shape and contour. No mass, hydronephrosis  or calculi. No perinephric fluid collection.       Other: No ascites.       Impression:      1. Liver appears enlarged and diffusely echogenic (likely steatosis).  Deep portions of the liver are poorly assessed due to attenuation of the  ultrasound beam.  2. Gallbladder and biliary tree are unremarkable.  3. No ascites.           This report was signed and finalized on 10/25/2024 9:05 AM by Dr Yoni Malin.               I have reviewed the patient's current medications.     Assessment/Plan   Assessment  Active Hospital Problems    Diagnosis     **Acute alcoholic hepatitis     Anasarca     Alcohol abuse     Cellulitis both upper extremeties     Hyperbilirubinemia     Chronic atrial fibrillation     Presence of Watchman left atrial appendage closure device     Permanent atrial fibrillation        Treatment Plan  Continue Lasix drip  Continue IV cefazolin  Continue serial CMP    Medical Decision Making  Number and Complexity of problems:   5 acute, high complexity conditions     Differential Diagnosis: Arnoldo's disease, hemolytic anemia     Conditions and Status        Condition is unchanged.     MDM Data  External documents reviewed: Care Everywhere  Cardiac tracing (EKG, telemetry) interpretation: See HPI  Radiology interpretation:  See HPI  Labs reviewed: See HPI  Any tests that were considered but not ordered: None     Decision rules/scores evaluated (example [id number], Wells, etc): [id number]     Discussed with: The patient     Care Planning  Shared decision making: The patient and gastroenterology  Code status and discussions: Full code     Disposition  Social Determinants of Health that impact treatment or disposition: None  I expect the patient to be discharged to home in 2-3 days.     Electronically signed by Lv Jean-Baptiset DO, 10/25/24, 16:20 CDT.

## 2024-10-25 NOTE — PROGRESS NOTES
[facility]enterology  Inpatient Progress Note  Today's date:  10/25/24    Kristian Mir  1945       Reason for Follow Up:   Elevated liver enzymes    Subjective:   Patient became somewhat agitated and confused overnight and early this morning. Now resting comfortably in bed.      Current Facility-Administered Medications:     acetaminophen (TYLENOL) tablet 650 mg, 650 mg, Oral, Q4H PRN **OR** acetaminophen (TYLENOL) 160 MG/5ML oral solution 650 mg, 650 mg, Oral, Q4H PRN **OR** acetaminophen (TYLENOL) suppository 650 mg, 650 mg, Rectal, Q4H PRN, Lv Jean-Baptiste DO    allopurinol (ZYLOPRIM) tablet 300 mg, 300 mg, Oral, QAM, Lv Jean-Baptiste DO, 300 mg at 10/24/24 0630    aluminum-magnesium hydroxide-simethicone (MAALOX MAX) 400-400-40 MG/5ML suspension 15 mL, 15 mL, Oral, Q6H PRN, Lv Jean-Baptiste DO    aspirin EC tablet 81 mg, 81 mg, Oral, Daily, Lv Jean-Baptiste DO, 81 mg at 10/24/24 0853    atorvastatin (LIPITOR) tablet 20 mg, 20 mg, Oral, Nightly, Lv Jean-Baptiste DO, 20 mg at 10/24/24 2127    sennosides-docusate (PERICOLACE) 8.6-50 MG per tablet 2 tablet, 2 tablet, Oral, BID PRN **AND** polyethylene glycol (MIRALAX) packet 17 g, 17 g, Oral, Daily PRN **AND** bisacodyl (DULCOLAX) EC tablet 5 mg, 5 mg, Oral, Daily PRN **AND** bisacodyl (DULCOLAX) suppository 10 mg, 10 mg, Rectal, Daily PRN, Lv Jean-Baptiste DO    Calcium Replacement - Follow Nurse / BPA Driven Protocol, , Does not apply, PRN, Lv Jean-Baptiste DO    ceFAZolin 2000 mg IVPB in 100 mL NS (MBP), 2,000 mg, Intravenous, Q8H, Lv Jean-Baptiste DO, 2,000 mg at 10/25/24 0418    empagliflozin (JARDIANCE) tablet 10 mg, 10 mg, Oral, Daily, Lv Jean-Baptiste DO, 10 mg at 10/24/24 0853    ferrous sulfate tablet 325 mg, 325 mg, Oral, Daily With Breakfast, Lv Jean-Baptiste DO, 325 mg at 10/24/24 0853    folic acid (FOLVITE) tablet 1 mg, 1 mg, Oral, Daily, Lv Jean-Baptiste DO, 1 mg at 10/24/24  0853    furosemide (LASIX) 500 mg in 50mL infusion, 10 mg/hr, Intravenous, Continuous, Lv Jean-Baptiste DO, Last Rate: 1 mL/hr at 10/25/24 0623, 10 mg/hr at 10/25/24 0623    LORazepam (ATIVAN) tablet 1 mg, 1 mg, Oral, Q1H PRN **OR** LORazepam (ATIVAN) injection 1 mg, 1 mg, Intravenous, Q1H PRN, 1 mg at 10/24/24 2138 **OR** LORazepam (ATIVAN) tablet 2 mg, 2 mg, Oral, Q1H PRN **OR** LORazepam (ATIVAN) injection 2 mg, 2 mg, Intravenous, Q1H PRN, 2 mg at 10/25/24 0623 **OR** LORazepam (ATIVAN) injection 2 mg, 2 mg, Intravenous, Q15 Min PRN, 2 mg at 10/24/24 2323 **OR** LORazepam (ATIVAN) injection 2 mg, 2 mg, Intramuscular, Q15 Min PRN, Lv Jean-Baptiste DO    Magnesium Standard Dose Replacement - Follow Nurse / BPA Driven Protocol, , Does not apply, PRN, Lv Jean-Baptiste DO    metoprolol succinate XL (TOPROL-XL) 24 hr tablet 50 mg, 50 mg, Oral, QAM, Lv Jean-Baptiste DO, 50 mg at 10/24/24 0630    ondansetron (ZOFRAN) injection 4 mg, 4 mg, Intravenous, Q6H PRN, Lv Jean-Baptiste DO    pantoprazole (PROTONIX) EC tablet 40 mg, 40 mg, Oral, Q AM, Lv Jean-Baptiste DO, 40 mg at 10/24/24 0630    pentoxifylline (TRENtal) CR tablet 400 mg, 400 mg, Oral, TID With Meals, Mina Wilcox MD, 400 mg at 10/24/24 1758    Phosphorus Replacement - Follow Nurse / BPA Driven Protocol, , Does not apply, PRN, Lv Jean-Baptiste DO    potassium chloride (MICRO-K/KLOR-CON) CR capsule, 10 mEq, Oral, Daily, Lv Jean-Baptiste DO, 10 mEq at 10/24/24 0853    potassium chloride (MICRO-K/KLOR-CON) CR capsule, 40 mEq, Oral, Q4H, Lv Jean-Baptiste DO    Potassium Replacement - Follow Nurse / BPA Driven Protocol, , Does not apply, PRN, Lv Jean-Baptiste DO    sodium chloride 0.9 % flush 10 mL, 10 mL, Intravenous, Q12H, Lv Jean-Baptiste DO, 10 mL at 10/24/24 2128    sodium chloride 0.9 % flush 10 mL, 10 mL, Intravenous, PRN, Lv Jean-Baptiste DO    sodium chloride 0.9 % infusion 40 mL, 40 mL, Intravenous, PRN,  "Lv Jean-Baptiste DO    spironolactone (ALDACTONE) tablet 25 mg, 25 mg, Oral, Q AM, Lv Jean-Baptiste DO    tamsulosin (FLOMAX) 24 hr capsule 0.4 mg, 0.4 mg, Oral, Q PM, Lv Jean-Baptiste DO, 0.4 mg at 10/24/24 1758    thiamine (B-1) injection 200 mg, 200 mg, Intravenous, Q8H, 200 mg at 10/25/24 0623 **FOLLOWED BY** [START ON 10/29/2024] thiamine (VITAMIN B-1) tablet 100 mg, 100 mg, Oral, Daily, Lv Jean-Baptiste DO      Vital Signs:  Temp:  [97.9 °F (36.6 °C)-98.8 °F (37.1 °C)] 97.9 °F (36.6 °C)  Heart Rate:  [] 116  Resp:  [18-22] 20  BP: ()/(58-79) 107/79    Physical Exam:  General: no acute distress, alert and oriented  HEENT: perrl, no sclera icterus  CV: rrr, no murmur  Resp: lungs clear to auscultation, no wheeze or rhonchi  Abd: soft, nontender, nondistended, no rebound our guarding  Skin: no rash, no jaundice     Results Review:   I have reviewed all of the patient's current test results    Results from last 7 days   Lab Units 10/23/24  1917   WBC 10*3/mm3 7.56   HEMOGLOBIN g/dL 12.0*   HEMATOCRIT % 34.3*   PLATELETS 10*3/mm3 179       Results from last 7 days   Lab Units 10/25/24  0723 10/24/24  0810 10/23/24  1917   SODIUM mmol/L 134* 133* 134*   POTASSIUM mmol/L 3.4* 4.9 3.5   CHLORIDE mmol/L 91* 93* 90*   CO2 mmol/L 29.0 28.0 32.0*   BUN mg/dL 7* 5* 4*   CREATININE mg/dL 1.09 0.54* 0.53*   CALCIUM mg/dL 7.6* 7.8* 8.0*   BILIRUBIN mg/dL 8.5* 9.3* 10.6*   ALK PHOS U/L 240* 237* 268*   ALT (SGPT) U/L 23 40 48*   AST (SGOT) U/L 119* 129* 136*   GLUCOSE mg/dL 114* 121* 125*       Results from last 7 days   Lab Units 10/25/24  0723 10/23/24  1917   INR  2.09* 1.62*       No results found for: \"LIPASE\"        Impression:  Elevated liver enzymes. Hyperbilirubinemia with elevated AST/ALT, consistent with alcoholic hepatitis.  Acute alcoholic hepatitis. Discriminant function today increased, up to 65. Bilirubin slightly improved but PT/INR increased.  Alcohol abuse with " withdrawal.  Cellulitis of upper extremity.    Plan:  Continue treatment for alcohol withdrawal and dts. Trend liver enzymes and PT/INR. Started on Pentoxifylline yesterday for treatment of alcoholic hepatitis. Steroids held due to cellulitis. However, if labs are worse tomorrow, may consider starting steroids now that he has been on antibiotics.     Mina Wilcox MD  10/25/24  10:55 CDT

## 2024-10-25 NOTE — THERAPY TREATMENT NOTE
Acute Care - Physical Therapy Treatment Note  [facility]     Patient Name: Kristian Mir  : 1945  MRN: [id number]  Today's Date: 10/25/2024      Visit Dx:     ICD-10-CM ICD-9-CM   1. Impaired mobility [Z74.09]  Z74.09 799.89     Patient Active Problem List   Diagnosis    Permanent atrial fibrillation    Hypertension    Non-ischemic cardiomyopathy    Encounter for screening for malignant neoplasm of colon    Macrocytic anemia    Primary osteoarthritis of left shoulder    H/O gastric ulcer    Alcohol use    Gastritis and duodenitis, chronic    Skin abrasion    Skin tear of right elbow without complication    Venous insufficiency    Iron deficiency anemia    Chronic blood loss anemia    Chronic diastolic (congestive) heart failure    Chronic systolic heart failure    Presence of Watchman left atrial appendage closure device    Acute alcoholic hepatitis    Anasarca    Alcohol abuse    Cellulitis both upper extremeties    Hyperbilirubinemia    Chronic atrial fibrillation     Past Medical History:   Diagnosis Date    Arrhythmia     Arthritis     Asthma     Atrial fibrillation     Cancer     skin    Chronic anticoagulation     Clotting disorder     COPD (chronic obstructive pulmonary disease)     GERD (gastroesophageal reflux disease)     History of transfusion      bleeding ulcers dr gonzalez    Hyperlipidemia     Hypertension     Non-ischemic cardiomyopathy 10/14/2021     Past Surgical History:   Procedure Laterality Date    ATRIAL APPENDAGE EXCLUSION LEFT WITH TRANSESOPHAGEAL ECHOCARDIOGRAM Right 2023    Procedure: Atrial Appendage Occlusion;  Surgeon: Daniel Clayton MD;  Location: [facility] INVASIVE LOC[facility];  Service: Cardiology;  Laterality: Right;    CARDIAC CATHETERIZATION      CARDIOVERSION      10/21 and  dr dawson richardson    CATARACT EXTRACTION Bilateral     laser juanita richardson    COLONOSCOPY N/A 04/15/2022    Procedure: COLONOSCOPY WITH ANESTHESIA;  Surgeon: Lion  Fly REECE DO;  Location: [facility] ENDOSCOPY;  Service: Gastroenterology;  Laterality: N/A;  pre anemia, screen  post polyps, diverticulosis  Norberto Hannah MD    COLONOSCOPY N/A 06/30/2023    Procedure: COLONOSCOPY WITH ANESTHESIA;  Surgeon: Fly Seymour DO;  Location: [facility] ENDOSCOPY;  Service: Gastroenterology;  Laterality: N/A;  Pre: Anemia;  Post: Diverticulosis;  Norberto Candelario MD    ENDOSCOPY N/A 05/09/2023    Procedure: ESOPHAGOGASTRODUODENOSCOPY WITH ANESTHESIA;  Surgeon: Fly Seymour DO;  Location: [facility] ENDOSCOPY;  Service: Gastroenterology;  Laterality: N/A;  Pre: Anemia  Post: Duodenitis, Gastritis  Norberto Candelario MD    JOINT REPLACEMENT Bilateral     hip    SKIN CANCER EXCISION Right 10/2023    TOTAL SHOULDER ARTHROPLASTY W/ DISTAL CLAVICLE EXCISION Left 01/03/2023    Procedure: LEFT REVERSE TOTAL SHOULDER ARTHROPLASTY;  Surgeon: Callum Hernandez MD;  Location: [facility] OR;  Service: Orthopedics;  Laterality: Left;     PT Assessment (Last 12 Hours)       PT Evaluation and Treatment       Row Name 10/25/24 1435 10/25/24 1100       Physical Therapy Time and Intention    Subjective Information complains of;weakness;fatigue  -JEANETTE --    Document Type therapy note (daily note)  -JEANETTE therapy note (daily note)  -JEANETTE    Mode of Treatment physical therapy  -JEANETTE physical therapy  -JEANETTE    Session Not Performed -- other (see comments)  -JEANETTE    Comment, Session Not Performed -- nsg stated pt has been receiving ativan due to increase agitation last night, will check back this afternoon  -JEANETTE      Row Name 10/25/24 1433          General Information    Patient/Family/Caregiver Comments/Observations wife present  -JEANETTE     Existing Precautions/Restrictions fall  -JEANETTE       Row Name 10/25/24 4400          Pain    Pretreatment Pain Rating 0/10 - no pain  -JEANETTE     Posttreatment Pain Rating 0/10 - no pain  -JEANETTE       Row Name 10/25/24 1432          Bed Mobility    Bed Mobility supine-sit;sit-supine  -JEANETTE      Supine-Sit Watkins (Bed Mobility) verbal cues;minimum assist (75% patient effort)  -JEANETTE     Sit-Supine Watkins (Bed Mobility) verbal cues;contact guard  -JEANETTE     Assistive Device (Bed Mobility) bed rails;head of bed elevated  -JEANETTE       Row Name 10/25/24 1435          Transfers    Transfers sit-stand transfer;stand-sit transfer  -JEANETTE       Row Name 10/25/24 1435          Sit-Stand Transfer    Sit-Stand Watkins (Transfers) verbal cues;minimum assist (75% patient effort);moderate assist (50% patient effort)  -JEANETTE     Assistive Device (Sit-Stand Transfers) walker, front-wheeled  -JEANETTE       Row Name 10/25/24 1435          Stand-Sit Transfer    Stand-Sit Watkins (Transfers) verbal cues;minimum assist (75% patient effort)  -JEANETTE     Assistive Device (Stand-Sit Transfers) walker, front-wheeled  -JEANETTE       Row Name 10/25/24 1435          Gait/Stairs (Locomotion)    Watkins Level (Gait) verbal cues;minimum assist (75% patient effort)  -JEANETTE     Assistive Device (Gait) walker, front-wheeled  -JEANETTE     Distance in Feet (Gait) 20  -JEANETTE     Deviations/Abnormal Patterns (Gait) gait speed decreased;stride length decreased  -JEANETTE     Bilateral Gait Deviations forward flexed posture  -JEANETTE     Comment, (Gait/Stairs) pt would lean to the right, difficult to correct balance.  Cues to  his feet and take a step instead of shuffling  -JEANETTE       Row Name 10/25/24 1435          Motor Skills    Comments, Therapeutic Exercise sitting AROM BLE x 10  -JEANETTE     Additional Documentation Comments, Therapeutic Exercise (Row)  -JEANETTE       Row Name 10/25/24 1436          Positioning and Restraints    Pre-Treatment Position in bed  -JEANETTE     Post Treatment Position bed  -JEANETTE     In Bed fowlers;call light within reach;encouraged to call for assist;exit alarm on;with family/caregiver;side rails up x2  -JEANETTE               User Key  (r) = Recorded By, (t) = Taken By, (c) = Cosigned By      Initials Name Provider Type    Steven Betancourt, PTA  Physical Therapist Assistant                    Physical Therapy Education       Title: PT OT SLP Therapies (Done)       Topic: Physical Therapy (Done)       Point: Mobility training (Done)       Learning Progress Summary            Patient Acceptance, E, VU,NR by JEANETTE at 10/25/2024 1435    Comment: gait, safety    Acceptance, E, VU by AJ at 10/24/2024 1139    Comment: role of skilled PT, low fall risk, beginning HEP                      Point: Home exercise program (Done)       Learning Progress Summary            Patient Acceptance, E, VU by TALIA at 10/24/2024 1139    Comment: role of skilled PT, low fall risk, beginning HEP                      Point: Body mechanics (Done)       Learning Progress Summary            Patient Acceptance, E, VU by TALIA at 10/24/2024 1139    Comment: role of skilled PT, low fall risk, beginning HEP                      Point: Precautions (Done)       Learning Progress Summary            Patient Acceptance, E, VU by TALIA at 10/24/2024 1139    Comment: role of skilled PT, low fall risk, beginning HEP                                      User Key       Initials Effective Dates Name Provider Type Discipline    JEANETTE 02/03/23 -  Steven Layne PTA Physical Therapist Assistant PT    TALIA 08/15/24 -  Rodger Raines PT DPT Physical Therapist PT                  PT Recommendation and Plan     Progress: declining  Outcome Evaluation: Pt in bed with family present, some confusion, able to follow cues.  Transfered supine to sitting with min assist using the bed rail..  Transfered sit to stand with min/mod assist.  Pt had increase difficulty maintaining balance ,brought pt a RWX.  Amb 20' with RWX and min assist with cues for gait and for safety.   Outcome Measures       Row Name 10/25/24 1437             How much help from another person do you currently need...    Turning from your back to your side while in flat bed without using bedrails? 3  -JEANETTE      Moving from lying on back to sitting on the side of  a flat bed without bedrails? 3  -JEANETTE      Moving to and from a bed to a chair (including a wheelchair)? 3  -JEANETTE      Standing up from a chair using your arms (e.g., wheelchair, bedside chair)? 3  -JEANETTE      Climbing 3-5 steps with a railing? 2  -JEANETTE      To walk in hospital room? 3  -JEANETTE      AM-PAC 6 Clicks Score (PT) 17  -JEANETTE         Functional Assessment    Outcome Measure Options AM-PAC 6 Clicks Basic Mobility (PT)  -JEANETTE                User Key  (r) = Recorded By, (t) = Taken By, (c) = Cosigned By      Initials Name Provider Type    Steven Betancourt PTA Physical Therapist Assistant                     Time Calculation:    PT Charges       Row Name 10/25/24 1435             Time Calculation    Start Time 1435  -JEANETTE      Stop Time 1515  -JEANETTE      Time Calculation (min) 40 min  -JEANETTE      PT Received On 10/25/24  -JEANETTE         Time Calculation- PT    Total Timed Code Minutes- PT 40 minute(s)  -JEANETTE         Timed Charges    27788 - PT Therapeutic Exercise Minutes 15  -JEANETTE      59503 - PT Therapeutic Activity Minutes 25  -JEANETTE         Total Minutes    Timed Charges Total Minutes 40  -JEANETTE       Total Minutes 40  -JEANETTE                User Key  (r) = Recorded By, (t) = Taken By, (c) = Cosigned By      Initials Name Provider Type    Steven Betancourt PTA Physical Therapist Assistant                  Therapy Charges for Today       Code Description Service Date Service Provider Modifiers Qty    21151535901 HC PT THERAPEUTIC ACT EA 15 MIN 10/25/2024 Steven Layne PTA GP 2    66092768921 HC PT THER PROC EA 15 MIN 10/25/2024 Steven Layne PTA GP 1            PT G-Codes  Outcome Measure Options: AM-PAC 6 Clicks Basic Mobility (PT)  AM-PAC 6 Clicks Score (PT): 17    Steven Layne PTA  10/25/2024

## 2024-10-25 NOTE — PLAN OF CARE
Goal Outcome Evaluation:  Plan of Care Reviewed With: patient        Progress: declining  Outcome Evaluation: Pt in bed with family present, some confusion, able to follow cues.  Transfered supine to sitting with min assist using the bed rail..  Transfered sit to stand with min/mod assist.  Pt had increase difficulty maintaining balance ,brought pt a RWX.  Amb 20' with RWX and min assist with cues for gait and for safety.

## 2024-10-25 NOTE — PLAN OF CARE
Goal Outcome Evaluation:  Plan of Care Reviewed With: patient           Outcome Evaluation: Patient confused at times. CIWA protocol in place; ativan given as needed. IV bumex infusing. Voiding/incontinent - good urine output. Generalized edema. Safety maintained

## 2024-10-26 LAB
ALBUMIN SERPL-MCNC: 2.7 G/DL (ref 3.5–5.2)
ALBUMIN/GLOB SERPL: 1.4 G/DL
ALP SERPL-CCNC: 211 U/L (ref 39–117)
ALT SERPL W P-5'-P-CCNC: 11 U/L (ref 1–41)
ANION GAP SERPL CALCULATED.3IONS-SCNC: 10 MMOL/L (ref 5–15)
AST SERPL-CCNC: 115 U/L (ref 1–40)
BILIRUB SERPL-MCNC: 7.3 MG/DL (ref 0–1.2)
BUN SERPL-MCNC: 7 MG/DL (ref 8–23)
BUN/CREAT SERPL: 6.9 (ref 7–25)
CALCIUM SPEC-SCNC: 7.2 MG/DL (ref 8.6–10.5)
CHLORIDE SERPL-SCNC: 96 MMOL/L (ref 98–107)
CO2 SERPL-SCNC: 33 MMOL/L (ref 22–29)
CREAT SERPL-MCNC: 1.01 MG/DL (ref 0.76–1.27)
EGFRCR SERPLBLD CKD-EPI 2021: 76.1 ML/MIN/1.73
GLOBULIN UR ELPH-MCNC: 1.9 GM/DL
GLUCOSE SERPL-MCNC: 81 MG/DL (ref 65–99)
INR PPP: 3.71 (ref 0.91–1.09)
INR PPP: 4.1 (ref 0.91–1.09)
POTASSIUM SERPL-SCNC: 3.7 MMOL/L (ref 3.5–5.2)
PROT SERPL-MCNC: 4.6 G/DL (ref 6–8.5)
PROTHROMBIN TIME: 38.2 SECONDS (ref 11.8–14.8)
PROTHROMBIN TIME: 41.3 SECONDS (ref 11.8–14.8)
SODIUM SERPL-SCNC: 139 MMOL/L (ref 136–145)

## 2024-10-26 PROCEDURE — 25010000002 THIAMINE HCL 200 MG/2ML SOLUTION: Performed by: FAMILY MEDICINE

## 2024-10-26 PROCEDURE — 80053 COMPREHEN METABOLIC PANEL: CPT | Performed by: FAMILY MEDICINE

## 2024-10-26 PROCEDURE — 85610 PROTHROMBIN TIME: CPT | Performed by: INTERNAL MEDICINE

## 2024-10-26 PROCEDURE — 63710000001 PREDNISOLONE 15 MG/5ML SOLUTION: Performed by: INTERNAL MEDICINE

## 2024-10-26 PROCEDURE — 99232 SBSQ HOSP IP/OBS MODERATE 35: CPT | Performed by: INTERNAL MEDICINE

## 2024-10-26 PROCEDURE — 25010000002 PHYTONADIONE 10 MG/ML SOLUTION: Performed by: INTERNAL MEDICINE

## 2024-10-26 PROCEDURE — 25010000002 FUROSEMIDE PER 20 MG: Performed by: FAMILY MEDICINE

## 2024-10-26 PROCEDURE — 25010000002 CEFAZOLIN PER 500 MG: Performed by: FAMILY MEDICINE

## 2024-10-26 RX ORDER — PHYTONADIONE 2 MG/ML
10 INJECTION, EMULSION INTRAMUSCULAR; INTRAVENOUS; SUBCUTANEOUS ONCE
Status: COMPLETED | OUTPATIENT
Start: 2024-10-26 | End: 2024-10-26

## 2024-10-26 RX ORDER — PREDNISOLONE 15 MG/5ML
40 SOLUTION ORAL
Status: DISCONTINUED | OUTPATIENT
Start: 2024-10-26 | End: 2024-10-28 | Stop reason: HOSPADM

## 2024-10-26 RX ORDER — FUROSEMIDE 10 MG/ML
60 INJECTION INTRAMUSCULAR; INTRAVENOUS EVERY 12 HOURS
Status: DISCONTINUED | OUTPATIENT
Start: 2024-10-26 | End: 2024-10-27

## 2024-10-26 RX ADMIN — THIAMINE HYDROCHLORIDE 200 MG: 100 INJECTION, SOLUTION INTRAMUSCULAR; INTRAVENOUS at 04:48

## 2024-10-26 RX ADMIN — POTASSIUM CHLORIDE 10 MEQ: 750 CAPSULE, EXTENDED RELEASE ORAL at 09:55

## 2024-10-26 RX ADMIN — PENTOXIFYLLINE 400 MG: 400 TABLET, EXTENDED RELEASE ORAL at 09:55

## 2024-10-26 RX ADMIN — TAMSULOSIN HYDROCHLORIDE 0.4 MG: 0.4 CAPSULE ORAL at 18:31

## 2024-10-26 RX ADMIN — ALLOPURINOL 300 MG: 100 TABLET ORAL at 06:46

## 2024-10-26 RX ADMIN — METOPROLOL SUCCINATE 50 MG: 50 TABLET, EXTENDED RELEASE ORAL at 06:46

## 2024-10-26 RX ADMIN — ATORVASTATIN CALCIUM 20 MG: 10 TABLET, FILM COATED ORAL at 20:53

## 2024-10-26 RX ADMIN — FERROUS SULFATE TAB 325 MG (65 MG ELEMENTAL FE) 325 MG: 325 (65 FE) TAB at 09:55

## 2024-10-26 RX ADMIN — ASPIRIN 81 MG: 81 TABLET, COATED ORAL at 09:55

## 2024-10-26 RX ADMIN — Medication 10 ML: at 09:55

## 2024-10-26 RX ADMIN — PREDNISOLONE ORAL 40 MG: 15 SOLUTION ORAL at 13:52

## 2024-10-26 RX ADMIN — FOLIC ACID 1 MG: 1 TABLET ORAL at 09:55

## 2024-10-26 RX ADMIN — CEFAZOLIN 2000 MG: 2 INJECTION, POWDER, FOR SOLUTION INTRAMUSCULAR; INTRAVENOUS at 20:52

## 2024-10-26 RX ADMIN — SPIRONOLACTONE 25 MG: 25 TABLET ORAL at 04:48

## 2024-10-26 RX ADMIN — FUROSEMIDE 60 MG: 10 INJECTION, SOLUTION INTRAMUSCULAR; INTRAVENOUS at 20:52

## 2024-10-26 RX ADMIN — EMPAGLIFLOZIN 10 MG: 10 TABLET, FILM COATED ORAL at 09:55

## 2024-10-26 RX ADMIN — PHYTONADIONE 10 MG: 10 INJECTION, EMULSION INTRAMUSCULAR; INTRAVENOUS; SUBCUTANEOUS at 13:52

## 2024-10-26 RX ADMIN — FUROSEMIDE 10 MG/HR: 10 INJECTION, SOLUTION INTRAMUSCULAR; INTRAVENOUS at 13:56

## 2024-10-26 RX ADMIN — CEFAZOLIN 2000 MG: 2 INJECTION, POWDER, FOR SOLUTION INTRAMUSCULAR; INTRAVENOUS at 04:19

## 2024-10-26 RX ADMIN — THIAMINE HYDROCHLORIDE 200 MG: 100 INJECTION, SOLUTION INTRAMUSCULAR; INTRAVENOUS at 23:04

## 2024-10-26 RX ADMIN — Medication 10 ML: at 20:52

## 2024-10-26 RX ADMIN — THIAMINE HYDROCHLORIDE 200 MG: 100 INJECTION, SOLUTION INTRAMUSCULAR; INTRAVENOUS at 13:56

## 2024-10-26 RX ADMIN — CEFAZOLIN 2000 MG: 2 INJECTION, POWDER, FOR SOLUTION INTRAMUSCULAR; INTRAVENOUS at 13:52

## 2024-10-26 RX ADMIN — LORAZEPAM 2 MG: 1 TABLET ORAL at 04:47

## 2024-10-26 RX ADMIN — PANTOPRAZOLE SODIUM 40 MG: 40 TABLET, DELAYED RELEASE ORAL at 04:48

## 2024-10-26 NOTE — PROGRESS NOTES
[facility] Gastroenterology  Inpatient Progress Note  Today's date:  10/26/24    Kristian Mir  1945       Reason for Follow Up:   Alcoholic hepatitis    Subjective:   More alert today. Sitting up in chair. Denies abdominal pain, nausea, vomiting.       Current Facility-Administered Medications:     acetaminophen (TYLENOL) tablet 650 mg, 650 mg, Oral, Q4H PRN **OR** acetaminophen (TYLENOL) 160 MG/5ML oral solution 650 mg, 650 mg, Oral, Q4H PRN **OR** acetaminophen (TYLENOL) suppository 650 mg, 650 mg, Rectal, Q4H PRN, Lv Jean-Baptiste DO    allopurinol (ZYLOPRIM) tablet 300 mg, 300 mg, Oral, QAM, Lv Jean-Baptiste DO, 300 mg at 10/26/24 0646    aluminum-magnesium hydroxide-simethicone (MAALOX MAX) 400-400-40 MG/5ML suspension 15 mL, 15 mL, Oral, Q6H PRN, Lv Jean-Baptiste DO    aspirin EC tablet 81 mg, 81 mg, Oral, Daily, Lv Jean-Baptiste DO, 81 mg at 10/25/24 1112    atorvastatin (LIPITOR) tablet 20 mg, 20 mg, Oral, Nightly, Lv Jean-Baptiste DO, 20 mg at 10/25/24 2019    sennosides-docusate (PERICOLACE) 8.6-50 MG per tablet 2 tablet, 2 tablet, Oral, BID PRN **AND** polyethylene glycol (MIRALAX) packet 17 g, 17 g, Oral, Daily PRN **AND** bisacodyl (DULCOLAX) EC tablet 5 mg, 5 mg, Oral, Daily PRN **AND** bisacodyl (DULCOLAX) suppository 10 mg, 10 mg, Rectal, Daily PRN, Lv Jean-Baptiste DO    Calcium Replacement - Follow Nurse / BPA Driven Protocol, , Does not apply, PRN, Lv Jean-Baptiste DO    ceFAZolin 2000 mg IVPB in 100 mL NS (MBP), 2,000 mg, Intravenous, Q8H, Lv Jean-Baptiste DO, 2,000 mg at 10/26/24 0419    empagliflozin (JARDIANCE) tablet 10 mg, 10 mg, Oral, Daily, Lv Jean-Baptiste DO, 10 mg at 10/25/24 1109    ferrous sulfate tablet 325 mg, 325 mg, Oral, Daily With Breakfast, Lv Jean-Baptiste DO, 325 mg at 10/25/24 1110    folic acid (FOLVITE) tablet 1 mg, 1 mg, Oral, Daily, Lv Jean-Baptiste DO, 1 mg at 10/25/24 1110    furosemide (LASIX) 500 mg in  50mL infusion, 10 mg/hr, Intravenous, Continuous, Lv Jean-Baptiste DO, Last Rate: 1 mL/hr at 10/25/24 0623, 10 mg/hr at 10/25/24 0623    LORazepam (ATIVAN) tablet 1 mg, 1 mg, Oral, Q1H PRN, 1 mg at 10/25/24 1315 **OR** LORazepam (ATIVAN) injection 1 mg, 1 mg, Intravenous, Q1H PRN, 1 mg at 10/25/24 1115 **OR** LORazepam (ATIVAN) tablet 2 mg, 2 mg, Oral, Q1H PRN, 2 mg at 10/26/24 0447 **OR** LORazepam (ATIVAN) injection 2 mg, 2 mg, Intravenous, Q1H PRN, 2 mg at 10/25/24 0623 **OR** LORazepam (ATIVAN) injection 2 mg, 2 mg, Intravenous, Q15 Min PRN, 2 mg at 10/24/24 2323 **OR** LORazepam (ATIVAN) injection 2 mg, 2 mg, Intramuscular, Q15 Min PRN, Lv Jean-Baptiste DO    Magnesium Standard Dose Replacement - Follow Nurse / BPA Driven Protocol, , Does not apply, PRN, Lv Jean-Baptiste DO    metoprolol succinate XL (TOPROL-XL) 24 hr tablet 50 mg, 50 mg, Oral, QAM, Lv Jean-Baptiste DO, 50 mg at 10/26/24 0646    ondansetron (ZOFRAN) injection 4 mg, 4 mg, Intravenous, Q6H PRN, vL Jean-Baptiste DO    pantoprazole (PROTONIX) EC tablet 40 mg, 40 mg, Oral, Q AM, Lv Jean-Baptiste DO, 40 mg at 10/26/24 0448    pentoxifylline (TRENtal) CR tablet 400 mg, 400 mg, Oral, TID With Meals, Mina Wilcox MD, 400 mg at 10/25/24 1820    Phosphorus Replacement - Follow Nurse / BPA Driven Protocol, , Does not apply, PRN, Lv Jean-Baptiste DO    potassium chloride (MICRO-K/KLOR-CON) CR capsule, 10 mEq, Oral, Daily, Lv Jean-Baptiste DO, 10 mEq at 10/25/24 1113    Potassium Replacement - Follow Nurse / BPA Driven Protocol, , Does not apply, PRN, Lv Jean-Baptiste,     sodium chloride 0.9 % flush 10 mL, 10 mL, Intravenous, Q12H, Lv Jean-Baptiste, , 10 mL at 10/25/24 2112    sodium chloride 0.9 % flush 10 mL, 10 mL, Intravenous, PRN, Lv Jean-Baptiste,     sodium chloride 0.9 % infusion 40 mL, 40 mL, Intravenous, PRN, Lv Jean-Baptiste, DO    spironolactone (ALDACTONE) tablet 25 mg, 25 mg, Oral, Q AM,  "Lv Jean-Baptiste DO, 25 mg at 10/26/24 0448    tamsulosin (FLOMAX) 24 hr capsule 0.4 mg, 0.4 mg, Oral, Q PM, Lv Jean-Baptiste DO, 0.4 mg at 10/25/24 1820    thiamine (B-1) injection 200 mg, 200 mg, Intravenous, Q8H, 200 mg at 10/26/24 0448 **FOLLOWED BY** [START ON 10/29/2024] thiamine (VITAMIN B-1) tablet 100 mg, 100 mg, Oral, Daily, Lv Jean-Baptiste DO      Vital Signs:  Temp:  [97.2 °F (36.2 °C)-97.8 °F (36.6 °C)] 97.3 °F (36.3 °C)  Heart Rate:  [] 106  Resp:  [20] 20  BP: (103-106)/(46-55) 103/46    Physical Exam:  General: no acute distress, alert and oriented  HEENT: perrl, sclera icterus  CV: rrr, no murmur  Resp: lungs clear to auscultation, no wheeze or rhonchi  Abd: soft, nontender, nondistended, no rebound our guarding  Skin: no rash, jaundice     Results Review:   I have reviewed all of the patient's current test results    Results from last 7 days   Lab Units 10/23/24  1917   WBC 10*3/mm3 7.56   HEMOGLOBIN g/dL 12.0*   HEMATOCRIT % 34.3*   PLATELETS 10*3/mm3 179       Results from last 7 days   Lab Units 10/26/24  0519 10/25/24  1640 10/25/24  0723 10/24/24  0810   SODIUM mmol/L 139  --  134* 133*   POTASSIUM mmol/L 3.7 4.2 3.4* 4.9   CHLORIDE mmol/L 96*  --  91* 93*   CO2 mmol/L 33.0*  --  29.0 28.0   BUN mg/dL 7*  --  7* 5*   CREATININE mg/dL 1.01  --  1.09 0.54*   CALCIUM mg/dL 7.2*  --  7.6* 7.8*   BILIRUBIN mg/dL 7.3*  --  8.5* 9.3*   ALK PHOS U/L 211*  --  240* 237*   ALT (SGPT) U/L 11  --  23 40   AST (SGOT) U/L 115*  --  119* 129*   GLUCOSE mg/dL 81  --  114* 121*       Results from last 7 days   Lab Units 10/26/24  0519 10/25/24  0723 10/23/24  1917   INR  3.71* 2.09* 1.62*       No results found for: \"LIPASE\"    Summary:  80 y/o male initially presenting to outside hospital with increased swelling in his arms. Found to have jaundice and elevated liver enzymes. Transferred here due to the elevated liver enzymes. Ultrasound and CT at outside hospital unremarkable. Ultrasound " here with fatty infiltration of the liver but otherwise unremarkable. Acute hepatitis panel negative. Suspect alcoholic hepatitis. Started on antibiotics for upper extremity cellulites. Started on Pentoxifylline for alcoholic hepatitis. Steroids initially not started due to the cellulitis and bilirubin has been trending down. However, has had increased PT/INR.     Impression:  Elevated liver enzymes with suspected alcoholic hepatitis. Bilirubin trending down. However, PT/INR trending up. Discriminant function significantly elevated today due to the elevated PT.   Upper extremity cellulitis.    Plan:  Continue to trend liver enzymes. Continue Pentoxifylline. Will repeat PT/INR now. If still elevated, will give dose of Vitamin K and continue starting him on steroids.    Mina Wilcox MD  10/26/24  09:36 CDT

## 2024-10-26 NOTE — PROGRESS NOTES
Gastroenterology Note:  Bilirubin trending down, but repeat INR is increasing. Will give Vitamin K 10 mg PO x 1 now. He is on Pentoxifylline. Increased PT/INR can be seen with this, but usually in patients with concomitant use with anticoagulant. Will stop Pentoxifylline. Due to the increasing PT/INR and rising discriminant function, will start him on steroids. Will start Prednisolone 40 mg daily. He does have upper extremity cellulitis, but this appears mild and he has been on antibiotics for a few days and has no associated fever or leukocytosis. Will continue to trend liver enzymes and PT/INR closely. Highly doubt that he would be a liver transplant candidate based on his age, active alcohol abuse, and other comorbidities.

## 2024-10-26 NOTE — PLAN OF CARE
Goal Outcome Evaluation:              Outcome Evaluation: Disoriented to situation at times. CIWA protocol. IV furosemide gtt per order. Voiding; incontinent. Resting comfortably between care. Safety maintained.

## 2024-10-26 NOTE — PROGRESS NOTES
[facility] Medicine Services  INPATIENT PROGRESS NOTE    Patient Name: Kristian Mir  Date of Admission: 10/23/2024  Today's Date: 10/26/24  Length of Stay: 3  Primary Care Physician: Norberto Candelario MD    Subjective   Chief Complaint: Elevated bilirubin  HPI     Bilirubin continues to trend down to today at 7.3.  INR is higher at 4.1.  Maddrey's discriminant function score is 142.  Cellulitis appears to be improved and Dr. Wilcox has started steroid treatment with oral prednisolone.  Will recheck CMP in a.m. creatinine 1.01.  Chloride is low at 96.  Potassium is within normal limits.  Will transition from Lasix drip to intermittent IV Lasix.    Review of Systems   All pertinent negatives and positives are as above. All other systems have been reviewed and are negative unless otherwise stated.     Objective    Temp:  [97.2 °F (36.2 °C)-98.2 °F (36.8 °C)] 98 °F (36.7 °C)  Heart Rate:  [] 79  Resp:  [18-20] 18  BP: (103-109)/(46-66) 108/64  Physical Exam    Constitutional:       General: He is not in acute distress.     Appearance: Normal appearance. He is obese.   HENT:      Head: Normocephalic and atraumatic.      Right Ear: External ear normal.      Left Ear: External ear normal.      Mouth: Mucous membranes are moist.      Pharynx: Oropharynx is clear.   Eyes:      General: Scleral icterus present.   Cardiovascular:      Rate and Rhythm: Normal rate.  Rhythm irregularly irregular.      Pulses: Normal pulses.      Heart sounds: Normal heart sounds.   Pulmonary:      Effort: Pulmonary effort is normal. No respiratory distress.      Breath sounds: Normal breath sounds.   Abdominal:      General: Abdomen is protuberant.      Palpations: Abdomen is soft. There is no mass.      Tenderness: There is no abdominal tenderness.      Comments: Pitting edema of the abdominal wall significantly improved.   Musculoskeletal:         General: Normal range of motion.      Right  "lower leg: Edema (3/4) present.      Left lower leg: Edema (3/4) present.   Skin:     General: Skin is warm and dry.   Neurological:      General: No focal deficit present.      Mental Status: He is alert and oriented to person, place, and time. Mental status is at baseline.      Comments: No asterixis noted.   Psychiatric:         Mood and Affect: Mood normal.         Judgment: Judgment normal.     Results Review:  I have reviewed the labs, radiology results, and diagnostic studies.    Laboratory Data:   Results from last 7 days   Lab Units 10/23/24  1917   WBC 10*3/mm3 7.56   HEMOGLOBIN g/dL 12.0*   HEMATOCRIT % 34.3*   PLATELETS 10*3/mm3 179        Results from last 7 days   Lab Units 10/26/24  0519 10/25/24  1640 10/25/24  0723 10/24/24  0810   SODIUM mmol/L 139  --  134* 133*   POTASSIUM mmol/L 3.7 4.2 3.4* 4.9   CHLORIDE mmol/L 96*  --  91* 93*   CO2 mmol/L 33.0*  --  29.0 28.0   BUN mg/dL 7*  --  7* 5*   CREATININE mg/dL 1.01  --  1.09 0.54*   CALCIUM mg/dL 7.2*  --  7.6* 7.8*   BILIRUBIN mg/dL 7.3*  --  8.5* 9.3*   ALK PHOS U/L 211*  --  240* 237*   ALT (SGPT) U/L 11  --  23 40   AST (SGOT) U/L 115*  --  119* 129*   GLUCOSE mg/dL 81  --  114* 121*       Culture Data:   No results found for: \"BLOODCX\", \"URINECX\", \"WOUNDCX\", \"MRSACX\", \"RESPCX\", \"STOOLCX\"    Radiology Data:   Imaging Results (Last 24 Hours)       ** No results found for the last 24 hours. **            I have reviewed the patient's current medications.     Assessment/Plan   Assessment  Active Hospital Problems    Diagnosis     **Acute alcoholic hepatitis     Anasarca     Alcohol abuse     Cellulitis both upper extremeties     Hyperbilirubinemia     Chronic atrial fibrillation     Presence of Watchman left atrial appendage closure device     Permanent atrial fibrillation        Treatment Plan  Discontinue Lasix drip  Lasix 60 mg IV every 12 hours  Continue daily CMP    Medical Decision Making  Number and Complexity of problems:   5 acute, high " complexity conditions     Differential Diagnosis: Arnoldo's disease, hemolytic anemia     Conditions and Status        Condition is unchanged.     [facility] Data  External documents reviewed: Care Everywhere  Cardiac tracing (EKG, telemetry) interpretation: See HPI  Radiology interpretation: See HPI  Labs reviewed: See HPI  Any tests that were considered but not ordered: None     Decision rules/scores evaluated (example [id number], Wells, etc): [id number]     Discussed with: The patient     Care Planning  Shared decision making: The patient and gastroenterology  Code status and discussions: Full code     Disposition  Social Determinants of Health that impact treatment or disposition: None  I expect the patient to be discharged to home in 2-3 days.     Electronically signed by Lv Jean-Baptiste DO, 10/26/24, 18:14 CDT.

## 2024-10-26 NOTE — PLAN OF CARE
Goal Outcome Evaluation:           Progress: improving                            CIWA SCORE 0, NO PRN ATIVAN REQUIRED. SITTER BEDSIDE. LASIX DRIP INFUSING PER ORDER. IV ABX. NO DISTRESS NOTED.

## 2024-10-27 PROBLEM — E03.9 ACQUIRED HYPOTHYROIDISM: Status: ACTIVE | Noted: 2024-10-27

## 2024-10-27 LAB
ALBUMIN SERPL-MCNC: 2.4 G/DL (ref 3.5–5.2)
ALBUMIN/GLOB SERPL: 1.3 G/DL
ALP SERPL-CCNC: 210 U/L (ref 39–117)
ALT SERPL W P-5'-P-CCNC: <5 U/L (ref 1–41)
ANION GAP SERPL CALCULATED.3IONS-SCNC: 14 MMOL/L (ref 5–15)
APTT PPP: 35.4 SECONDS (ref 24.5–36)
AST SERPL-CCNC: 111 U/L (ref 1–40)
BILIRUB SERPL-MCNC: 6.5 MG/DL (ref 0–1.2)
BUN SERPL-MCNC: 9 MG/DL (ref 8–23)
BUN/CREAT SERPL: 7.4 (ref 7–25)
CALCIUM SPEC-SCNC: 7.1 MG/DL (ref 8.6–10.5)
CHLORIDE SERPL-SCNC: 94 MMOL/L (ref 98–107)
CO2 SERPL-SCNC: 29 MMOL/L (ref 22–29)
CREAT SERPL-MCNC: 1.21 MG/DL (ref 0.76–1.27)
EGFRCR SERPLBLD CKD-EPI 2021: 61.3 ML/MIN/1.73
FERRITIN SERPL-MCNC: 855.6 NG/ML (ref 30–400)
GLOBULIN UR ELPH-MCNC: 1.9 GM/DL
GLUCOSE SERPL-MCNC: 135 MG/DL (ref 65–99)
INR PPP: 1.49 (ref 0.91–1.09)
INR PPP: 2.28 (ref 0.91–1.09)
IRON 24H UR-MRATE: 74 MCG/DL (ref 59–158)
IRON SATN MFR SERPL: 36 % (ref 20–50)
LDH SERPL-CCNC: 523 U/L (ref 135–225)
POTASSIUM SERPL-SCNC: 3.5 MMOL/L (ref 3.5–5.2)
POTASSIUM SERPL-SCNC: 4.2 MMOL/L (ref 3.5–5.2)
PROT SERPL-MCNC: 4.3 G/DL (ref 6–8.5)
PROTHROMBIN TIME: 18.6 SECONDS (ref 11.8–14.8)
PROTHROMBIN TIME: 26.1 SECONDS (ref 11.8–14.8)
PT MIX W PLASMA: 13.8 SECONDS (ref 11.8–14.8)
SODIUM SERPL-SCNC: 137 MMOL/L (ref 136–145)
TIBC SERPL-MCNC: 206 MCG/DL (ref 298–536)
TRANSFERRIN SERPL-MCNC: 138 MG/DL (ref 200–360)

## 2024-10-27 PROCEDURE — 84132 ASSAY OF SERUM POTASSIUM: CPT | Performed by: FAMILY MEDICINE

## 2024-10-27 PROCEDURE — 83615 LACTATE (LD) (LDH) ENZYME: CPT | Performed by: INTERNAL MEDICINE

## 2024-10-27 PROCEDURE — 80053 COMPREHEN METABOLIC PANEL: CPT | Performed by: FAMILY MEDICINE

## 2024-10-27 PROCEDURE — 25010000002 THIAMINE HCL 200 MG/2ML SOLUTION: Performed by: FAMILY MEDICINE

## 2024-10-27 PROCEDURE — 99232 SBSQ HOSP IP/OBS MODERATE 35: CPT | Performed by: INTERNAL MEDICINE

## 2024-10-27 PROCEDURE — 85610 PROTHROMBIN TIME: CPT | Performed by: INTERNAL MEDICINE

## 2024-10-27 PROCEDURE — 82607 VITAMIN B-12: CPT | Performed by: INTERNAL MEDICINE

## 2024-10-27 PROCEDURE — 85730 THROMBOPLASTIN TIME PARTIAL: CPT | Performed by: INTERNAL MEDICINE

## 2024-10-27 PROCEDURE — 83540 ASSAY OF IRON: CPT | Performed by: INTERNAL MEDICINE

## 2024-10-27 PROCEDURE — 85611 PROTHROMBIN TEST: CPT | Performed by: INTERNAL MEDICINE

## 2024-10-27 PROCEDURE — 97116 GAIT TRAINING THERAPY: CPT

## 2024-10-27 PROCEDURE — 82746 ASSAY OF FOLIC ACID SERUM: CPT | Performed by: INTERNAL MEDICINE

## 2024-10-27 PROCEDURE — 84466 ASSAY OF TRANSFERRIN: CPT | Performed by: INTERNAL MEDICINE

## 2024-10-27 PROCEDURE — 97110 THERAPEUTIC EXERCISES: CPT

## 2024-10-27 PROCEDURE — 63710000001 PREDNISOLONE 15 MG/5ML SOLUTION: Performed by: INTERNAL MEDICINE

## 2024-10-27 PROCEDURE — 25010000002 FUROSEMIDE PER 20 MG: Performed by: FAMILY MEDICINE

## 2024-10-27 PROCEDURE — 99222 1ST HOSP IP/OBS MODERATE 55: CPT | Performed by: INTERNAL MEDICINE

## 2024-10-27 PROCEDURE — 82728 ASSAY OF FERRITIN: CPT | Performed by: INTERNAL MEDICINE

## 2024-10-27 PROCEDURE — 83010 ASSAY OF HAPTOGLOBIN QUANT: CPT | Performed by: INTERNAL MEDICINE

## 2024-10-27 PROCEDURE — 25010000002 CEFAZOLIN PER 500 MG: Performed by: FAMILY MEDICINE

## 2024-10-27 RX ORDER — LEVOTHYROXINE SODIUM 50 UG/1
50 TABLET ORAL
Status: DISCONTINUED | OUTPATIENT
Start: 2024-10-28 | End: 2024-10-28 | Stop reason: HOSPADM

## 2024-10-27 RX ORDER — POTASSIUM CHLORIDE 750 MG/1
40 CAPSULE, EXTENDED RELEASE ORAL EVERY 4 HOURS
Status: DISPENSED | OUTPATIENT
Start: 2024-10-27 | End: 2024-10-27

## 2024-10-27 RX ORDER — FUROSEMIDE 10 MG/ML
40 INJECTION INTRAMUSCULAR; INTRAVENOUS EVERY 12 HOURS
Status: DISCONTINUED | OUTPATIENT
Start: 2024-10-27 | End: 2024-10-28 | Stop reason: HOSPADM

## 2024-10-27 RX ORDER — CEFDINIR 300 MG/1
300 CAPSULE ORAL EVERY 12 HOURS SCHEDULED
Status: DISCONTINUED | OUTPATIENT
Start: 2024-10-27 | End: 2024-10-28 | Stop reason: HOSPADM

## 2024-10-27 RX ORDER — PHYTONADIONE 2 MG/ML
10 INJECTION, EMULSION INTRAMUSCULAR; INTRAVENOUS; SUBCUTANEOUS ONCE
Status: COMPLETED | OUTPATIENT
Start: 2024-10-27 | End: 2024-10-27

## 2024-10-27 RX ADMIN — POTASSIUM CHLORIDE 40 MEQ: 750 CAPSULE, EXTENDED RELEASE ORAL at 11:15

## 2024-10-27 RX ADMIN — FERROUS SULFATE TAB 325 MG (65 MG ELEMENTAL FE) 325 MG: 325 (65 FE) TAB at 11:15

## 2024-10-27 RX ADMIN — POTASSIUM CHLORIDE 10 MEQ: 750 CAPSULE, EXTENDED RELEASE ORAL at 11:15

## 2024-10-27 RX ADMIN — Medication 10 ML: at 21:54

## 2024-10-27 RX ADMIN — THIAMINE HYDROCHLORIDE 200 MG: 100 INJECTION, SOLUTION INTRAMUSCULAR; INTRAVENOUS at 15:28

## 2024-10-27 RX ADMIN — ALLOPURINOL 300 MG: 100 TABLET ORAL at 06:37

## 2024-10-27 RX ADMIN — EMPAGLIFLOZIN 10 MG: 10 TABLET, FILM COATED ORAL at 11:15

## 2024-10-27 RX ADMIN — ATORVASTATIN CALCIUM 20 MG: 10 TABLET, FILM COATED ORAL at 21:53

## 2024-10-27 RX ADMIN — CEFAZOLIN 2000 MG: 2 INJECTION, POWDER, FOR SOLUTION INTRAMUSCULAR; INTRAVENOUS at 04:39

## 2024-10-27 RX ADMIN — THIAMINE HYDROCHLORIDE 200 MG: 100 INJECTION, SOLUTION INTRAMUSCULAR; INTRAVENOUS at 06:38

## 2024-10-27 RX ADMIN — TAMSULOSIN HYDROCHLORIDE 0.4 MG: 0.4 CAPSULE ORAL at 21:53

## 2024-10-27 RX ADMIN — SPIRONOLACTONE 25 MG: 25 TABLET ORAL at 06:37

## 2024-10-27 RX ADMIN — Medication 10 ML: at 11:16

## 2024-10-27 RX ADMIN — PANTOPRAZOLE SODIUM 40 MG: 40 TABLET, DELAYED RELEASE ORAL at 06:37

## 2024-10-27 RX ADMIN — FOLIC ACID 1 MG: 1 TABLET ORAL at 11:15

## 2024-10-27 RX ADMIN — PHYTONADIONE 10 MG: 2 INJECTION, EMULSION INTRAMUSCULAR; INTRAVENOUS; SUBCUTANEOUS at 21:53

## 2024-10-27 RX ADMIN — CEFDINIR 300 MG: 300 CAPSULE ORAL at 11:26

## 2024-10-27 RX ADMIN — PREDNISOLONE ORAL 40 MG: 15 SOLUTION ORAL at 11:14

## 2024-10-27 RX ADMIN — FUROSEMIDE 40 MG: 10 INJECTION, SOLUTION INTRAVENOUS at 21:53

## 2024-10-27 RX ADMIN — CEFDINIR 300 MG: 300 CAPSULE ORAL at 21:53

## 2024-10-27 RX ADMIN — THIAMINE HYDROCHLORIDE 200 MG: 100 INJECTION, SOLUTION INTRAMUSCULAR; INTRAVENOUS at 21:53

## 2024-10-27 RX ADMIN — METOPROLOL SUCCINATE 50 MG: 50 TABLET, EXTENDED RELEASE ORAL at 06:37

## 2024-10-27 RX ADMIN — ASPIRIN 81 MG: 81 TABLET, COATED ORAL at 11:17

## 2024-10-27 NOTE — THERAPY TREATMENT NOTE
Acute Care - Physical Therapy Treatment Note  [facility]     Patient Name: Kristian Mir  : 1945  MRN: [id number]  Today's Date: 10/27/2024      Visit Dx:     ICD-10-CM ICD-9-CM   1. Impaired mobility [Z74.09]  Z74.09 799.89     Patient Active Problem List   Diagnosis    Permanent atrial fibrillation    Hypertension    Non-ischemic cardiomyopathy    Encounter for screening for malignant neoplasm of colon    Macrocytic anemia    Primary osteoarthritis of left shoulder    H/O gastric ulcer    Alcohol use    Gastritis and duodenitis, chronic    Skin abrasion    Skin tear of right elbow without complication    Venous insufficiency    Iron deficiency anemia    Chronic blood loss anemia    Chronic diastolic (congestive) heart failure    Chronic systolic heart failure    Presence of Watchman left atrial appendage closure device    Acute alcoholic hepatitis    Anasarca    Alcohol abuse    Cellulitis both upper extremeties    Hyperbilirubinemia    Chronic atrial fibrillation     Past Medical History:   Diagnosis Date    Arrhythmia     Arthritis     Asthma     Atrial fibrillation     Cancer     skin    Chronic anticoagulation     Clotting disorder     COPD (chronic obstructive pulmonary disease)     GERD (gastroesophageal reflux disease)     History of transfusion      bleeding ulcers dr gonzalez    Hyperlipidemia     Hypertension     Non-ischemic cardiomyopathy 10/14/2021     Past Surgical History:   Procedure Laterality Date    ATRIAL APPENDAGE EXCLUSION LEFT WITH TRANSESOPHAGEAL ECHOCARDIOGRAM Right 2023    Procedure: Atrial Appendage Occlusion;  Surgeon: Daniel Clayton MD;  Location: [facility] INVASIVE LOCATION;  Service: Cardiology;  Laterality: Right;    CARDIAC CATHETERIZATION      CARDIOVERSION      10/21 and  dr dawson richardson    CATARACT EXTRACTION Bilateral     laser juanita richardson    COLONOSCOPY N/A 04/15/2022    Procedure: COLONOSCOPY WITH ANESTHESIA;  Surgeon: Lion  Fly REECE DO;  Location: [facility] ENDOSCOPY;  Service: Gastroenterology;  Laterality: N/A;  pre anemia, screen  post polyps, diverticulosis  Norberto Hannah MD    COLONOSCOPY N/A 06/30/2023    Procedure: COLONOSCOPY WITH ANESTHESIA;  Surgeon: Fly Seymour DO;  Location: [facility] ENDOSCOPY;  Service: Gastroenterology;  Laterality: N/A;  Pre: Anemia;  Post: Diverticulosis;  Norberto Candelario MD    ENDOSCOPY N/A 05/09/2023    Procedure: ESOPHAGOGASTRODUODENOSCOPY WITH ANESTHESIA;  Surgeon: Fly Seymour DO;  Location: [facility] ENDOSCOPY;  Service: Gastroenterology;  Laterality: N/A;  Pre: Anemia  Post: Duodenitis, Gastritis  Norberto Candelario MD    JOINT REPLACEMENT Bilateral     hip    SKIN CANCER EXCISION Right 10/2023    TOTAL SHOULDER ARTHROPLASTY W/ DISTAL CLAVICLE EXCISION Left 01/03/2023    Procedure: LEFT REVERSE TOTAL SHOULDER ARTHROPLASTY;  Surgeon: Callum Hernandez MD;  Location: [facility] OR;  Service: Orthopedics;  Laterality: Left;     PT Assessment (Last 12 Hours)       PT Evaluation and Treatment       Row Name 10/27/24 0904          Physical Therapy Time and Intention    Subjective Information no complaints  -AB     Document Type therapy note (daily note)  -AB     Mode of Treatment physical therapy  -AB       Row Name 10/27/24 0904          General Information    Existing Precautions/Restrictions fall  -AB       Row Name 10/27/24 0904          Bed Mobility    Comment, (Bed Mobility) up in chair  -AB       Row Name 10/27/24 0904          Sit-Stand Transfer    Sit-Stand [place] (Transfers) contact guard  -AB       Row Name 10/27/24 0904          Stand-Sit Transfer    Stand-Sit [place] (Transfers) contact guard  -AB       Row Name 10/27/24 0904          Gait/Stairs (Locomotion)    [place] Level (Gait) verbal cues;contact guard  -AB     Assistive Device (Gait) walker, front-wheeled  -AB     Distance in Feet (Gait) 200  -AB     Deviations/Abnormal Patterns (Gait) gait speed  decreased;stride length decreased  -AB     Bilateral Gait Deviations forward flexed posture  -AB       Row Name 10/27/24 0904          Motor Skills    Comments, Therapeutic Exercise Sitting AROM Raj LE 10 reps x2  -AB       Row Name 10/27/24 0904          Positioning and Restraints    Pre-Treatment Position sitting in chair/recliner  -AB     Post Treatment Position chair  -AB     In Chair reclined;sitting;call light within reach  -AB               User Key  (r) = Recorded By, (t) = Taken By, (c) = Cosigned By      Initials Name Provider Type    AB Mariella Harrington, PTA Physical Therapist Assistant                    Physical Therapy Education       Title: PT OT SLP Therapies (Done)       Topic: Physical Therapy (Done)       Point: Mobility training (Done)       Learning Progress Summary            Patient Acceptance, E, VU,NR by JEANETTE at 10/25/2024 1435    Comment: gait, safety    Acceptance, E, VU by TALIA at 10/24/2024 1139    Comment: role of skilled PT, low fall risk, beginning HEP                      Point: Home exercise program (Done)       Learning Progress Summary            Patient Acceptance, E, VU by TALIA at 10/24/2024 1139    Comment: role of skilled PT, low fall risk, beginning HEP                      Point: Body mechanics (Done)       Learning Progress Summary            Patient Acceptance, E, VU by TALIA at 10/24/2024 1139    Comment: role of skilled PT, low fall risk, beginning HEP                      Point: Precautions (Done)       Learning Progress Summary            Patient Acceptance, E, VU by TALIA at 10/24/2024 1139    Comment: role of skilled PT, low fall risk, beginning HEP                                      User Key       Initials Effective Dates Name Provider Type Discipline    JEANETTE 02/03/23 -  Steven Layne PTA Physical Therapist Assistant PT    TALIA 08/15/24 -  Rodger Raines PT DPT Physical Therapist PT                  PT Recommendation and Plan         Outcome Measures       Row Name 10/25/24  1435             How much help from another person do you currently need...    Turning from your back to your side while in flat bed without using bedrails? 3  -JEANETTE      Moving from lying on back to sitting on the side of a flat bed without bedrails? 3  -JEANETTE      Moving to and from a bed to a chair (including a wheelchair)? 3  -JEANETTE      Standing up from a chair using your arms (e.g., wheelchair, bedside chair)? 3  -JEANETTE      Climbing 3-5 steps with a railing? 2  -JENAETTE      To walk in hospital room? 3  -JEANETTE      AM-PAC 6 Clicks Score (PT) 17  -JEANETTE         Functional Assessment    Outcome Measure Options AM-PAC 6 Clicks Basic Mobility (PT)  -JEANETTE                User Key  (r) = Recorded By, (t) = Taken By, (c) = Cosigned By      Initials Name Provider Type    Steven Betancourt, ANAYA Physical Therapist Assistant                     Time Calculation:    PT Charges       Row Name 10/27/24 0904             Time Calculation    Start Time 0904  -AB      Stop Time 0942  -AB      Time Calculation (min) 38 min  -AB      PT Received On 10/27/24  -AB         Time Calculation- PT    Total Timed Code Minutes- PT 38 minute(s)  -AB                User Key  (r) = Recorded By, (t) = Taken By, (c) = Cosigned By      Initials Name Provider Type    Mariella Leblanc PTA Physical Therapist Assistant                      PT G-Codes  Outcome Measure Options: AM-PAC 6 Clicks Basic Mobility (PT)  AM-PAC 6 Clicks Score (PT): 16    Mariella Harrington PTA  10/27/2024

## 2024-10-27 NOTE — PLAN OF CARE
Goal Outcome Evaluation:  Plan of Care Reviewed With: patient        Progress: no change    Patient received alert and orientated with intermittent episodes of confusion to situation.  Patient incontinent of bladder and had frequent urinary incontinent episodes with 1 caregiver assist to help assist with linen changes.  Patient safety maintained with call bell in reach with frequent education on when to call the nursing staff for assistance to get out of bed.  Bed alarm is on and frequent rounding completed throughout the shift.  Patient took all medications per provider orders with no reports of side effects. Will continue to assess and treat patient per plan of care.

## 2024-10-27 NOTE — PLAN OF CARE
Goal Outcome Evaluation:           Progress: improving  Outcome Evaluation: A&O. Room air. Up with assist x1 and a walker; amb in rodriguez and up in chair. PT. Hematology consult this shift. Lasix d/c'd. PO abx started. No c/o pain or nausea. CIWA d/c'd per protocol. SCDs. Potassium replaced. Reg diet. Safety maintained       Abd pad, kerlix, ace wrap applied to RUE per order due to skin weeping.

## 2024-10-27 NOTE — PROGRESS NOTES
"S: Reports feeling \"a lot better than yesterday\".  Denies confusion.  He describes his alcohol consumption as 6-8 ounces of vodka daily.  He denies drinking beer or using Tylenol.    O: /70, pulse 108       General appearance: NAD, alert and oriented x 3          Laboratory: Total bilirubin 6.5, AST//5, INR 2.28    A: Severe alcoholic hepatitis which is improved on prednisolone    P: If the patient continues to show signs of improvement after 1 week on prednisolone then the duration of treatment is 28 days.  Improvement is confirmed by the Lille score, bilirubin, or discriminant function.  Alcohol abstinence and nutrition are also recommended.  Potentially, the patient could be discharged tomorrow provided that his bilirubin and INR are stable to improved.  He should follow-up with GI in 1 week.  "

## 2024-10-27 NOTE — PROGRESS NOTES
[facility] Medicine Services  INPATIENT PROGRESS NOTE    Patient Name: Kristian Mir  Date of Admission: 10/23/2024  Today's Date: 10/27/24  Length of Stay: 4  Primary Care Physician: Norberto Candelario MD    Subjective   Chief Complaint: Elevated bilirubin, swelling and weeping right arm  HPI     The patient has developed some weeping of the right arm.  Erythema is significantly improved but edema persists.  I have asked nursing to apply a compression wrap lightly to the right upper extremity as well as bandages to the weeping area.  Manders discriminant function score is 71 today.  He appears to be much improved.  He is alert, talkative and joking with me today.  Creatinine 1.21 today.  Will decrease Lasix to 40 mg IV every 12 hours.  Will transition cefazolin to oral cefdinir today as well.  Continue steroids per gastroenterology.  Ceruloplasmin negative.  Direct Julissa positive.  TSH elevated at 5.740 and thyroid supplementation will be started low-dose.    Review of Systems   All pertinent negatives and positives are as above. All other systems have been reviewed and are negative unless otherwise stated.     Objective    Temp:  [97.8 °F (36.6 °C)-98.2 °F (36.8 °C)] 97.8 °F (36.6 °C)  Heart Rate:  [] 100  Resp:  [18] 18  BP: (100-113)/(55-79) 113/55  Physical Exam    Constitutional:       General: He is not in acute distress.     Appearance: Normal appearance. He is obese.   HENT:      Head: Normocephalic and atraumatic.      Right Ear: External ear normal.      Left Ear: External ear normal.      Mouth: Mucous membranes are moist.      Pharynx: Oropharynx is clear.   Eyes:      General: Scleral icterus present.   Cardiovascular:      Rate and Rhythm: Normal rate.  Rhythm irregularly irregular.      Pulses: Normal pulses.      Heart sounds: Normal heart sounds.   Pulmonary:      Effort: Pulmonary effort is normal. No respiratory distress.      Breath sounds: Normal  "breath sounds.   Abdominal:      General: Abdomen is protuberant.      Palpations: Abdomen is soft. There is no mass.      Tenderness: There is no abdominal tenderness.      Comments: Pitting edema of the abdominal wall significantly improved.   Musculoskeletal:         General: Normal range of motion.      Right lower leg: Edema (3/4) present.      Left lower leg: Edema (3/4) present.   Skin:     General: Skin is warm and dry.   Neurological:      General: No focal deficit present.      Mental Status: He is alert and oriented to person, place, and time. Mental status is at baseline.      Comments: No asterixis noted.   Psychiatric:         Mood and Affect: Mood normal.         Judgment: Judgment normal.     Results Review:  I have reviewed the labs, radiology results, and diagnostic studies.    Laboratory Data:   Results from last 7 days   Lab Units 10/23/24  1917   WBC 10*3/mm3 7.56   HEMOGLOBIN g/dL 12.0*   HEMATOCRIT % 34.3*   PLATELETS 10*3/mm3 179        Results from last 7 days   Lab Units 10/27/24  0415 10/26/24  0519 10/25/24  1640 10/25/24  0723   SODIUM mmol/L 137 139  --  134*   POTASSIUM mmol/L 3.5 3.7 4.2 3.4*   CHLORIDE mmol/L 94* 96*  --  91*   CO2 mmol/L 29.0 33.0*  --  29.0   BUN mg/dL 9 7*  --  7*   CREATININE mg/dL 1.21 1.01  --  1.09   CALCIUM mg/dL 7.1* 7.2*  --  7.6*   BILIRUBIN mg/dL 6.5* 7.3*  --  8.5*   ALK PHOS U/L 210* 211*  --  240*   ALT (SGPT) U/L <5 11  --  23   AST (SGOT) U/L 111* 115*  --  119*   GLUCOSE mg/dL 135* 81  --  114*       Culture Data:   No results found for: \"BLOODCX\", \"URINECX\", \"WOUNDCX\", \"MRSACX\", \"RESPCX\", \"STOOLCX\"    Radiology Data:   Imaging Results (Last 24 Hours)       ** No results found for the last 24 hours. **            I have reviewed the patient's current medications.     Assessment/Plan   Assessment  Active Hospital Problems    Diagnosis     **Acute alcoholic hepatitis     Anasarca     Alcohol abuse     Cellulitis both upper extremeties     " Hyperbilirubinemia     Chronic atrial fibrillation     Presence of Watchman left atrial appendage closure device     Permanent atrial fibrillation        Treatment Plan  Discontinue cefazolin  Cefdinir 300 mg p.o. twice daily  Decrease Lasix to 40 mg IV every 12 hours  Increase activity  Compression wrap to right upper extremity  Synthroid 50 mcg p.o. daily    Medical Decision Making  Number and Complexity of problems:   5 acute, high complexity conditions     Differential Diagnosis: Arnoldo's disease, hemolytic anemia     Conditions and Status        Condition is improved     MDM Data  External documents reviewed: Care Everywhere  Cardiac tracing (EKG, telemetry) interpretation: See HPI  Radiology interpretation: See HPI  Labs reviewed: See HPI  Any tests that were considered but not ordered: None     Decision rules/scores evaluated (example [id number], Wells, etc): [id number]     Discussed with: The patient     Care Planning  Shared decision making: The patient and gastroenterology  Code status and discussions: Full code     Disposition  Social Determinants of Health that impact treatment or disposition: None  I expect the patient to be discharged to home in 2-3 days.     Electronically signed by Lv Jean-Baptiste DO, 10/27/24, 11:11 CDT.

## 2024-10-28 ENCOUNTER — TELEPHONE (OUTPATIENT)
Dept: ONCOLOGY | Facility: CLINIC | Age: 79
End: 2024-10-28

## 2024-10-28 ENCOUNTER — READMISSION MANAGEMENT (OUTPATIENT)
Dept: CALL CENTER | Facility: HOSPITAL | Age: 79
End: 2024-10-28
Payer: COMMERCIAL

## 2024-10-28 VITALS
OXYGEN SATURATION: 96 % | DIASTOLIC BLOOD PRESSURE: 72 MMHG | RESPIRATION RATE: 18 BRPM | SYSTOLIC BLOOD PRESSURE: 115 MMHG | HEIGHT: 67 IN | BODY MASS INDEX: 36.38 KG/M2 | WEIGHT: 231.8 LBS | HEART RATE: 98 BPM | TEMPERATURE: 97.4 F

## 2024-10-28 LAB
ALBUMIN SERPL-MCNC: 2.8 G/DL (ref 3.5–5.2)
ALBUMIN/GLOB SERPL: 1.6 G/DL
ALP SERPL-CCNC: 213 U/L (ref 39–117)
ALT SERPL W P-5'-P-CCNC: <5 U/L (ref 1–41)
ANION GAP SERPL CALCULATED.3IONS-SCNC: 8 MMOL/L (ref 5–15)
AST SERPL-CCNC: 116 U/L (ref 1–40)
BILIRUB SERPL-MCNC: 6.5 MG/DL (ref 0–1.2)
BUN SERPL-MCNC: 14 MG/DL (ref 8–23)
BUN/CREAT SERPL: 10.2 (ref 7–25)
CALCIUM SPEC-SCNC: 6.9 MG/DL (ref 8.6–10.5)
CHLORIDE SERPL-SCNC: 95 MMOL/L (ref 98–107)
CO2 SERPL-SCNC: 31 MMOL/L (ref 22–29)
CREAT SERPL-MCNC: 1.37 MG/DL (ref 0.76–1.27)
EGFRCR SERPLBLD CKD-EPI 2021: 52.8 ML/MIN/1.73
FOLATE SERPL-MCNC: 16.4 NG/ML (ref 4.78–24.2)
GLOBULIN UR ELPH-MCNC: 1.8 GM/DL
GLUCOSE SERPL-MCNC: 138 MG/DL (ref 65–99)
HAPTOGLOB SERPL-MCNC: 16 MG/DL (ref 30–200)
POTASSIUM SERPL-SCNC: 4.1 MMOL/L (ref 3.5–5.2)
PROT SERPL-MCNC: 4.6 G/DL (ref 6–8.5)
SODIUM SERPL-SCNC: 134 MMOL/L (ref 136–145)
VIT B12 BLD-MCNC: >2000 PG/ML (ref 211–946)

## 2024-10-28 PROCEDURE — 97116 GAIT TRAINING THERAPY: CPT

## 2024-10-28 PROCEDURE — 80053 COMPREHEN METABOLIC PANEL: CPT | Performed by: FAMILY MEDICINE

## 2024-10-28 PROCEDURE — 25010000002 THIAMINE HCL 200 MG/2ML SOLUTION: Performed by: FAMILY MEDICINE

## 2024-10-28 PROCEDURE — 63710000001 PREDNISOLONE 15 MG/5ML SOLUTION: Performed by: INTERNAL MEDICINE

## 2024-10-28 PROCEDURE — 25010000002 FUROSEMIDE PER 20 MG: Performed by: FAMILY MEDICINE

## 2024-10-28 PROCEDURE — 97110 THERAPEUTIC EXERCISES: CPT

## 2024-10-28 PROCEDURE — 99231 SBSQ HOSP IP/OBS SF/LOW 25: CPT | Performed by: INTERNAL MEDICINE

## 2024-10-28 RX ORDER — LANOLIN ALCOHOL/MO/W.PET/CERES
100 CREAM (GRAM) TOPICAL DAILY
Qty: 30 TABLET | Refills: 0 | Status: SHIPPED | OUTPATIENT
Start: 2024-10-29

## 2024-10-28 RX ORDER — LEVOTHYROXINE SODIUM 50 UG/1
50 TABLET ORAL
Qty: 30 TABLET | Refills: 0 | Status: SHIPPED | OUTPATIENT
Start: 2024-10-29

## 2024-10-28 RX ORDER — CEFDINIR 300 MG/1
300 CAPSULE ORAL EVERY 12 HOURS SCHEDULED
Qty: 7 CAPSULE | Refills: 0 | Status: SHIPPED | OUTPATIENT
Start: 2024-10-28 | End: 2024-11-01

## 2024-10-28 RX ORDER — PREDNISOLONE 15 MG/5ML
40 SOLUTION ORAL
Qty: 450 ML | Refills: 0 | Status: SHIPPED | OUTPATIENT
Start: 2024-10-28 | End: 2024-11-01 | Stop reason: SDUPTHER

## 2024-10-28 RX ORDER — FOLIC ACID 1 MG/1
1 TABLET ORAL DAILY
Qty: 30 TABLET | Refills: 0 | Status: SHIPPED | OUTPATIENT
Start: 2024-10-29

## 2024-10-28 RX ADMIN — THIAMINE HYDROCHLORIDE 200 MG: 100 INJECTION, SOLUTION INTRAMUSCULAR; INTRAVENOUS at 05:43

## 2024-10-28 RX ADMIN — LEVOTHYROXINE SODIUM 50 MCG: 0.05 TABLET ORAL at 05:43

## 2024-10-28 RX ADMIN — FUROSEMIDE 40 MG: 10 INJECTION, SOLUTION INTRAVENOUS at 05:43

## 2024-10-28 RX ADMIN — METOPROLOL SUCCINATE 50 MG: 50 TABLET, EXTENDED RELEASE ORAL at 05:43

## 2024-10-28 RX ADMIN — EMPAGLIFLOZIN 10 MG: 10 TABLET, FILM COATED ORAL at 08:56

## 2024-10-28 RX ADMIN — ALLOPURINOL 300 MG: 100 TABLET ORAL at 05:43

## 2024-10-28 RX ADMIN — ASPIRIN 81 MG: 81 TABLET, COATED ORAL at 08:56

## 2024-10-28 RX ADMIN — CEFDINIR 300 MG: 300 CAPSULE ORAL at 08:56

## 2024-10-28 RX ADMIN — Medication 10 ML: at 10:27

## 2024-10-28 RX ADMIN — POTASSIUM CHLORIDE 10 MEQ: 750 CAPSULE, EXTENDED RELEASE ORAL at 08:56

## 2024-10-28 RX ADMIN — FOLIC ACID 1 MG: 1 TABLET ORAL at 08:56

## 2024-10-28 RX ADMIN — PREDNISOLONE ORAL 40 MG: 15 SOLUTION ORAL at 10:27

## 2024-10-28 RX ADMIN — SPIRONOLACTONE 25 MG: 25 TABLET ORAL at 05:43

## 2024-10-28 RX ADMIN — PANTOPRAZOLE SODIUM 40 MG: 40 TABLET, DELAYED RELEASE ORAL at 05:43

## 2024-10-28 NOTE — CONSULTS
[facility] Oncology/Hematology Services  CONSULT NOTE    PATIENT NAME:  Kristian Mir  YOB: 1945  PATIENT MRN:  [id number]    Date of Admission:  10/23/2024  Consultation Date:  10/27/2024  Referring Provider: Sanna Rucker APRN      Subjective     Reason for Consultation: DAFNE+ anemia    History of present illness: Kristian Mir 79 y.o. male medical history includes atrial fibrillation/Watchman device, COPD, arthritis, distant history of upper GI bleed requiring blood infusions, nonischemic cardiomyopathy, cellulitis of both upper extremities, alcohol abuse with acute alcoholic hepatitis.    -10/23/2024-admitted with swollen red arms bilaterally and scleral icterus.  His Dr.at [facility] directed him to the ER.  There, CT imaging from the chest through the abdomen and pelvis as well as ultrasound of the right upper quadrant revealed no evidence of biliary dilatation or obstruction. Chest CT imaging and abdominal and pelvic imaging revealed body wall edema but was otherwise unremarkable. Bilateral upper extremity Doppler studies were negative for DVT.  Labs showed a bilirubin of 10.6, alk phos 268, , WBC normal, platelets 179,000, albumin 3.3.  He was transferred to this facility due to the bilirubinemia,  The patient has a long history of daily beer drinking up to 6 pack/day.  Over the past 4 months has also been drinking vodka and cranberry juice, 3-4 drinks per day.  He has been seen by GI in consultation for acute EtOH hepatitis and the hyperbilirubinemia,.  He is felt to be a candidate for steroid therapy however with the cellulitis of his upper extremities and stable bilirubin, they have held off.  Plan: Outpatient workup for underlying liver disease/cirrhosis.    -10/23/2024-other labs: PT 19.9/INR 1.62, DAFNE IgG positive.  Direct bilirubin 7.7.  Hemoglobin 12, hematocrit 34.3, .9, platelets 179,000, magnesium 1.6 with normal differential. nRBC 0.4.   No other RBC abnormalities described.    -10/24/2024-hepatitis panel-nonreactive.  PT 24.3/INR 2.09.  ALT 23,  (prior 143), alk phos 240 (prior 268), bilirubin 8.5 (prior: 10.6)    -10/25/2024-ultrasound abdomen-liver appears enlarged and diffusely echogenic (likely steatosis).  The portions of the liver poorly assessed due to attenuation of the ultrasound beam.  Gallbladder and biliary tree unremarkable.  No ascites.    -10/26/2024-total protein 4.6, ALT 11,  (prior peak: 143), alk phos 211 (prior peak 268), bilirubin 7.3.  PT 41.3/INR 4.1    -10/27/2024-PT 26.1/INR 2.28, ALT<5, , alk phos 210, bilirubin 6.5. PT mixed with plasma 13.8, PTT 35.4 (corrected), iron 74, iron saturation 36, ferritin 855,  (elevated).    -10/28/2024-the patient is seen with regard the macrocytic anemia and positive IgG DAFNE.  ALT<5, , alk phos 213, total bili 6.5, GFR 52.8.    Past Medical History:  Past Medical History:   Diagnosis Date    Arrhythmia     Arthritis     Asthma     Atrial fibrillation     Cancer     skin    Chronic anticoagulation     Clotting disorder     COPD (chronic obstructive pulmonary disease)     GERD (gastroesophageal reflux disease)     History of transfusion     1992 bleeding ulcers dr gonzalez    Hyperlipidemia     Hypertension     Non-ischemic cardiomyopathy 10/14/2021     Prior Surgeries:  Past Surgical History:   Procedure Laterality Date    ATRIAL APPENDAGE EXCLUSION LEFT WITH TRANSESOPHAGEAL ECHOCARDIOGRAM Right 09/12/2023    Procedure: Atrial Appendage Occlusion;  Surgeon: Daniel Clayton MD;  Location: [facility] CATH INVASIVE LOCATION;  Service: Cardiology;  Laterality: Right;    CARDIAC CATHETERIZATION      CARDIOVERSION      10/21 and 11/21 dr dawson richardson    CATAR[facility] EXTRACTION Bilateral     [facility]    COLONOSCOPY N/A 04/15/2022    Procedure: COLONOSCOPY WITH ANESTHESIA;  Surgeon: Fly Seymour DO;  Location: [facility] ENDOSCOPY;  Service:  Gastroenterology;  Laterality: N/A;  pre anemia, screen  post polyps, diverticulosis  Norberto Hannah MD    COLONOSCOPY N/A 06/30/2023    Procedure: COLONOSCOPY WITH ANESTHESIA;  Surgeon: Fly Seymour DO;  Location: [facility] ENDOSCOPY;  Service: Gastroenterology;  Laterality: N/A;  Pre: Anemia;  Post: Diverticulosis;  Norberto Candelario MD    ENDOSCOPY N/A 05/09/2023    Procedure: ESOPHAGOGASTRODUODENOSCOPY WITH ANESTHESIA;  Surgeon: Fly Seymour DO;  Location: [facility] ENDOSCOPY;  Service: Gastroenterology;  Laterality: N/A;  Pre: Anemia  Post: Duodenitis, Gastritis  Norberto Candelario MD    JOINT REPLACEMENT Bilateral     hip    SKIN CANCER EXCISION Right 10/2023    TOTAL SHOULDER ARTHROPLASTY W/ DISTAL CLAVICLE EXCISION Left 01/03/2023    Procedure: LEFT REVERSE TOTAL SHOULDER ARTHROPLASTY;  Surgeon: Callum Hernandez MD;  Location: [facility] OR;  Service: Orthopedics;  Laterality: Left;        Allergies:Patient has no known allergies.  PTA Meds:  Medications Prior to Admission   Medication Sig Dispense Refill Last Dose/Taking    allopurinol (ZYLOPRIM) 300 MG tablet Take 1 tablet by mouth Every Morning.   10/23/2024    aspirin 81 MG EC tablet Take 1 tablet by mouth Daily.   10/23/2024    atorvastatin (LIPITOR) 40 MG tablet Take 0.5 tablets by mouth Every Night.   10/22/2024    empagliflozin (JARDIANCE) 25 MG tablet tablet Take 0.5 tablets by mouth Daily.   10/23/2024    ferrous sulfate 325 (65 FE) MG tablet Take 1 tablet by mouth Daily With Breakfast.   10/23/2024    furosemide (LASIX) 20 MG tablet Take 1 tablet by mouth Every Morning.   10/23/2024    metoprolol succinate XL (TOPROL-XL) 100 MG 24 hr tablet Take 0.5 tablets by mouth Every Morning.   10/23/2024    multivitamin with minerals tablet tablet Take 1 tablet by mouth Every Morning.   10/23/2024    pantoprazole (PROTONIX) 40 MG EC tablet Take 1 tablet by mouth Every Morning.   10/23/2024    potassium chloride (KLOR-CON M20) 20 MEQ CR  tablet Take 0.5 tablets by mouth Daily.   10/23/2024    spironolactone (ALDACTONE) 25 MG tablet Take 1 tablet by mouth Every Morning.   10/23/2024    tamsulosin (FLOMAX) 0.4 MG capsule 24 hr capsule Take 1 capsule by mouth Every Evening.   10/22/2024      Current Meds:   Current Facility-Administered Medications   Medication Dose Route Frequency Provider Last Rate Last Admin    acetaminophen (TYLENOL) tablet 650 mg  650 mg Oral Q4H PRN Lv Jean-Baptiste DO        Or    acetaminophen (TYLENOL) 160 MG/5ML oral solution 650 mg  650 mg Oral Q4H PRN Lv Jean-Baptiste DO        Or    acetaminophen (TYLENOL) suppository 650 mg  650 mg Rectal Q4H PRN Lv Jean-Baptiste DO        allopurinol (ZYLOPRIM) tablet 300 mg  300 mg Oral QAM Lv Jean-Baptiste DO   300 mg at 10/27/24 0637    aluminum-magnesium hydroxide-simethicone (MAALOX MAX) 400-400-40 MG/5ML suspension 15 mL  15 mL Oral Q6H PRN Lv Jean-Baptiste DO        aspirin EC tablet 81 mg  81 mg Oral Daily Lv Jean-Baptiste DO   81 mg at 10/27/24 1117    atorvastatin (LIPITOR) tablet 20 mg  20 mg Oral Nightly Lv Jean-Baptiste DO   20 mg at 10/26/24 2053    sennosides-docusate (PERICOLACE) 8.6-50 MG per tablet 2 tablet  2 tablet Oral BID PRN Lv Jean-Baptiste DO        And    polyethylene glycol (MIRALAX) packet 17 g  17 g Oral Daily PRN Lv Jean-Baptiste DO        And    bisacodyl (DULCOLAX) EC tablet 5 mg  5 mg Oral Daily PRN Lv Jean-Baptiste DO        And    bisacodyl (DULCOLAX) suppository 10 mg  10 mg Rectal Daily PRN Lv Jean-Baptiste DO        Calcium Replacement - Follow Nurse / BPA Driven Protocol   Does not apply PRN Lv Jean-Baptiste DO        cefdinir (OMNICEF) capsule 300 mg  300 mg Oral Q12H Lv Jean-Baptiste DO   300 mg at 10/27/24 1126    empagliflozin (JARDIANCE) tablet 10 mg  10 mg Oral Daily Lv Jean-Baptiste DO   10 mg at 10/27/24 1115    ferrous sulfate tablet 325 mg  325 mg Oral Daily With Breakfast Estiven  Lv PATRICIO DO   325 mg at 10/27/24 1115    folic acid (FOLVITE) tablet 1 mg  1 mg Oral Daily Lv Jean-Baptiste DO   1 mg at 10/27/24 1115    furosemide (LASIX) injection 40 mg  40 mg Intravenous Q12H Lv Jean-Baptiste DO        [START ON 10/28/2024] levothyroxine (SYNTHROID, LEVOTHROID) tablet 50 mcg  50 mcg Oral Q AM Lv Jean-Baptiste DO        LORazepam (ATIVAN) tablet 1 mg  1 mg Oral Q1H PRN Lv Jean-Baptiste DO   1 mg at 10/25/24 1315    Or    LORazepam (ATIVAN) injection 1 mg  1 mg Intravenous Q1H PRN Lv Jean-Baptiste DO   1 mg at 10/25/24 1115    Or    LORazepam (ATIVAN) tablet 2 mg  2 mg Oral Q1H PRN Lv Jean-Baptiste DO   2 mg at 10/26/24 0447    Or    LORazepam (ATIVAN) injection 2 mg  2 mg Intravenous Q1H PRN Lv Jean-Baptiste DO   2 mg at 10/25/24 0623    Or    LORazepam (ATIVAN) injection 2 mg  2 mg Intravenous Q15 Min PRN Lv Jean-Baptiste DO   2 mg at 10/24/24 2323    Or    LORazepam (ATIVAN) injection 2 mg  2 mg Intramuscular Q15 Min PRN Lv Jean-Baptiste DO        Magnesium Standard Dose Replacement - Follow Nurse / BPA Driven Protocol   Does not apply PRN Lv Jean-Baptiste DO        metoprolol succinate XL (TOPROL-XL) 24 hr tablet 50 mg  50 mg Oral QAM Lv Jean-Baptiste DO   50 mg at 10/27/24 0637    ondansetron (ZOFRAN) injection 4 mg  4 mg Intravenous Q6H PRN Lv Jean-Baptiste DO        pantoprazole (PROTONIX) EC tablet 40 mg  40 mg Oral Q AM Lv Jean-Baptiste DO   40 mg at 10/27/24 0637    Phosphorus Replacement - Follow Nurse / BPA Driven Protocol   Does not apply PRN Lv Jean-Baptiste DO        potassium chloride (MICRO-K/KLOR-CON) CR capsule  10 mEq Oral Daily Lv Jean-Baptiste DO   10 mEq at 10/27/24 1115    Potassium Replacement - Follow Nurse / BPA Driven Protocol   Does not apply PRN Lv Jean-Baptiste,         prednisoLONE (PRELONE) oral solution 40 mg  40 mg Oral Daily With Breakfast Mina Wilcox MD   40 mg at 10/27/24 1114    sodium  "chloride 0.9 % flush 10 mL  10 mL Intravenous Q12H Lv Jean-Baptiste DO   10 mL at 10/27/24 1116    sodium chloride 0.9 % flush 10 mL  10 mL Intravenous PRN Lv Jean-Baptiste DO        sodium chloride 0.9 % infusion 40 mL  40 mL Intravenous PRN Lv Jean-Baptiste DO        spironolactone (ALDACTONE) tablet 25 mg  25 mg Oral Q AM Lv Jean-Baptiste DO   25 mg at 10/27/24 0637    tamsulosin (FLOMAX) 24 hr capsule 0.4 mg  0.4 mg Oral Q PM Lv Jean-Baptiste DO   0.4 mg at 10/26/24 1831    thiamine (B-1) injection 200 mg  200 mg Intravenous Q8H Lv Jean-Baptiste DO   200 mg at 10/27/24 1528    Followed by    [START ON 10/29/2024] thiamine (VITAMIN B-1) tablet 100 mg  100 mg Oral Daily Lv Jean-Baptiste DO           Family History:family history includes Cancer in his mother; Colon cancer in his brother; Heart disease in his father; Heart failure in his father.   Social History: reports that he quit smoking about 19 years ago. His smoking use included cigarettes. He has been exposed to tobacco smoke. He has never used smokeless tobacco. He reports current alcohol use of about 11.0 standard drinks of alcohol per week. He reports that he does not use drugs.    Review of Systems  Review of Systems:   Constitutional:  Fatigue.  Good appetite.  No fever / No chills / No sweats  HEENT:  No sore throat / No hoarseness / No vision changes  CV:  No chest pain / + intermittent palpitations / No orthopnea  Respiratory:  No cough /+ exertional shortness of breath / No sputum / No hemoptysis  GI:  No nausea / No vomiting / No abdominal pain / No diarrhea / No constipation/+dark stools, \"that is from the iron and taking\"  :  No dysuria / No hesitancy / No urgency / No hematuria  Neuro:  + muscle weakness / No dysphagia / No headache / No paresthesias  Musculoskeletal:  +edema / No cyanosis / No pain      Objective      Vitals: BP 91/57 (BP Location: Left arm, Patient Position: Sitting)   Pulse 109   Temp 97.1 °F " "(36.2 °C) (Oral)   Resp 18   Ht 170.2 cm (67.01\")   Wt 105 kg (231 lb 12.8 oz)   SpO2 97%   BMI 36.30 kg/m²     Physical Exam  Physical Exam:  General appearance:  alert, appears stated age and cooperative. Obese.  Appears comfortable while seated in a chair at the bedside.  No family in attendance.  Skin:  Skin color pale. +hyperemia UEs  HEENT:  Head: Normal, normocephalic, atraumatic. + scleral icterus  Neck:  no adenopathy, no tenderness/mass/nodules  Lungs:  clear to auscultation bilaterally  Heart:  +tachycardia with irregular rate and rhythm  Abdomen:  soft, non-tender, +pitting edema abdominal wall  Extremities:  Symmetrical edema 3+  Neurologic:  Mental status: Alert, oriented, thought content appropriate    Results from last 7 days   Lab Units 10/23/24  1917   WBC 10*3/mm3 7.56   HEMOGLOBIN g/dL 12.0*   HEMATOCRIT % 34.3*   PLATELETS 10*3/mm3 179        Results from last 7 days   Lab Units 10/27/24  0415 10/26/24  0519 10/25/24  1640 10/25/24  0723   SODIUM mmol/L 137 139  --  134*   POTASSIUM mmol/L 3.5 3.7 4.2 3.4*   CHLORIDE mmol/L 94* 96*  --  91*   CO2 mmol/L 29.0 33.0*  --  29.0   BUN mg/dL 9 7*  --  7*   CREATININE mg/dL 1.21 1.01  --  1.09   CALCIUM mg/dL 7.1* 7.2*  --  7.6*   BILIRUBIN mg/dL 6.5* 7.3*  --  8.5*   ALK PHOS U/L 210* 211*  --  240*   ALT (SGPT) U/L <5 11  --  23   AST (SGOT) U/L 111* 115*  --  119*   GLUCOSE mg/dL 135* 81  --  114*       ABG:  No results found for: \"PHART\", \"PO2ART\", \"OVK9KLE\"    Culture Data:   No results found for: \"BLOODCX\", \"URINECX\", \"WOUNDCX\", \"MRSACX\", \"RESPCX\", \"STOOLCX\"    No results found for: \"PATHINTERP\"    Radiology:   Imaging Results (Last 7 Days)       Procedure Component Value Units Date/Time    US Abdomen Limited [[id number]] Collected: 10/25/24 0902     Updated: 10/25/24 0908    Narrative:      US ABDOMEN LIMITED-10/25/2024 7:15 AM     REASON FOR EXAM: elevated liver enzymes, alcoholic hepatitis;  Z74.09-Other reduced mobility       COMPARISON: " Abdominal CT dated 6/23/2023     TECHNIQUE: Multiple longitudinal and transverse realtime sonographic  images of the right upper quadrant of the abdomen are obtained.  Ultrasound images and report are permanently stored in the PACS.     FINDINGS:    Pancreas: Included portions of the pancreatic head, neck and body are  unremarkable. The tail of the pancreas is obscured from view.     Liver: Liver is diffusely echogenic and difficult to penetrate. Deep  portions of the liver are obscured from assessment by beam attenuation.  Liver surface contour appears smooth. Portal veins are patent with  hepatopetal flow. No obvious focal lesion.     Gallbladder: No intraluminal echoes, wall thickening, or pericholecystic  fluid.     Bile ducts: The CBD measures 0.7 cm in diameter. There is no  intrahepatic or extrahepatic ductal dilation.     Right kidney: Normal in size, shape and contour. No mass, hydronephrosis  or calculi. No perinephric fluid collection.       Other: No ascites.       Impression:      1. Liver appears enlarged and diffusely echogenic (likely steatosis).  Deep portions of the liver are poorly assessed due to attenuation of the  ultrasound beam.  2. Gallbladder and biliary tree are unremarkable.  3. No ascites.           This report was signed and finalized on 10/25/2024 9:05 AM by Dr Yoni Malin.                  Assessment/Plan        ASSESSMENT:    Macrocytic anemia.  Differential possibilities include chronic liver disease, hemolysis, MDS, direct EtOH marrow suppression/toxicity, B12/folate deficiency and other nutritional deficiencies.  - Stable.  Hgb 12; , 10/23/24 (prior red:  Hgb 9.2, 9/13/23)  +IgG DAFNE.  Chronic autoimmune hemolytic anemia?  Etiology unclear  Prolonged PT/INR -corrected with plasma mix.  Related to factor deficiency from [facility]   Cellulitis both upper extremities  Direct bilirubinemia and hepatic transaminitis.  Appears to be from EtOH hepatitis  EtOH abuse  Permanent atrial  "fibrillation  Excess ferritin  Hepatomegaly with diffuse echogenicity (steatosis?) with no gallbladder nor biliary tree abnormalities and no ascites on liver ultrasound, 10/25/2024.      PLAN:  Review available diagnostic information as outlined above.  Note the patient's macrocytic anemia is chronic and is stable compared to historic reference range ( Hgb 9.2, 9/13/2023).  Note the repleted iron studies, elevated LDH, correction of PT/PTT with plasma mixing (factor deficiency from [facility]?)  Pending B12, folate, and haptoglobin  Vitamin K 10 mg daily x 3  Transfuse RBCs if Hgb <7 or <8 if symptomatic  EtOH cessation encouraged.  Patient states, \"I'm through with it.\"  Stop ferrous sulfate   continue other general medical management/support  Follow-up hematology (JONG Castro or Dr. Rodriguez) post discharge    I discussed the patients findings and my recommendations with the patient     Bin Chavira MD  10/27/24  20:18 CDT    Time: I spent 55  minutes caring for Kristian on this date of service. This time includes time spent by me in the following activities: preparing for the visit, reviewing tests, performing a medically appropriate examination and/or evaluation, counseling and educating the patient/family/caregiver, ordering medications, tests, or procedures and documenting information in the medical record.     Thank you for allowing me to participate in the care of Mr. Kristian Mir  "

## 2024-10-28 NOTE — THERAPY TREATMENT NOTE
Acute Care - Physical Therapy Treatment Note  [facility]     Patient Name: Kristian Mir  : 1945  MRN: [id number]  Today's Date: 10/28/2024      Visit Dx:     ICD-10-CM ICD-9-CM   1. Impaired mobility [Z74.09]  Z74.09 799.89     Patient Active Problem List   Diagnosis    Permanent atrial fibrillation    Hypertension    Non-ischemic cardiomyopathy    Encounter for screening for malignant neoplasm of colon    Macrocytic anemia    Primary osteoarthritis of left shoulder    H/O gastric ulcer    Alcohol use    Gastritis and duodenitis, chronic    Skin abrasion    Skin tear of right elbow without complication    Venous insufficiency    Iron deficiency anemia    Chronic blood loss anemia    Chronic diastolic (congestive) heart failure    Chronic systolic heart failure    Presence of Watchman left atrial appendage closure device    Acute alcoholic hepatitis    Anasarca    Alcohol abuse    Cellulitis both upper extremeties    Hyperbilirubinemia    Chronic atrial fibrillation    Acquired hypothyroidism     Past Medical History:   Diagnosis Date    Arrhythmia     Arthritis     Asthma     Atrial fibrillation     Cancer     skin    Chronic anticoagulation     Clotting disorder     COPD (chronic obstructive pulmonary disease)     GERD (gastroesophageal reflux disease)     History of transfusion      bleeding ulcers dr gonzalez    Hyperlipidemia     Hypertension     Non-ischemic cardiomyopathy 10/14/2021     Past Surgical History:   Procedure Laterality Date    ATRIAL APPENDAGE EXCLUSION LEFT WITH TRANSESOPHAGEAL ECHOCARDIOGRAM Right 2023    Procedure: Atrial Appendage Occlusion;  Surgeon: Daniel Clayton MD;  Location: [facility] INVASIVE LOCATION;  Service: Cardiology;  Laterality: Right;    CARDIAC CATHETERIZATION      CARDIOVERSION      10/21 and  dr dawson richardson    CATARACT EXTRACTION Bilateral     laser juanita richardson    COLONOSCOPY N/A 04/15/2022    Procedure: COLONOSCOPY WITH  ANESTHESIA;  Surgeon: Fly Seymour DO;  Location: [facility] ENDOSCOPY;  Service: Gastroenterology;  Laterality: N/A;  pre anemia, screen  post polyps, diverticulosis  Norberto Hannah MD    COLONOSCOPY N/A 06/30/2023    Procedure: COLONOSCOPY WITH ANESTHESIA;  Surgeon: Fly Seymour DO;  Location: [facility] ENDOSCOPY;  Service: Gastroenterology;  Laterality: N/A;  Pre: Anemia;  Post: Diverticulosis;  Norberto Candelario MD    ENDOSCOPY N/A 05/09/2023    Procedure: ESOPHAGOGASTRODUODENOSCOPY WITH ANESTHESIA;  Surgeon: Fly Seymour DO;  Location: [facility] ENDOSCOPY;  Service: Gastroenterology;  Laterality: N/A;  Pre: Anemia  Post: Duodenitis, Gastritis  Norberto Candelario MD    JOINT REPLACEMENT Bilateral     hip    SKIN CANCER EXCISION Right 10/2023    TOTAL SHOULDER ARTHROPLASTY W/ DISTAL CLAVICLE EXCISION Left 01/03/2023    Procedure: LEFT REVERSE TOTAL SHOULDER ARTHROPLASTY;  Surgeon: Callum Hernandez MD;  Location: [facility] OR;  Service: Orthopedics;  Laterality: Left;     PT Assessment (Last 12 Hours)       PT Evaluation and Treatment       Row Name 10/28/24 1000          Physical Therapy Time and Intention    Subjective Information no complaints  -KJ     Document Type therapy note (daily note)  -KJ     Mode of Treatment physical therapy  -KJ     Patient Effort excellent  -KJ       Row Name 10/28/24 1000          General Information    Existing Precautions/Restrictions fall  -KJ       Row Name 10/28/24 1000          Pain    Pretreatment Pain Rating 0/10 - no pain  -KJ     Posttreatment Pain Rating 0/10 - no pain  -KJ       Row Name 10/28/24 1000          Bed Mobility    Comment, (Bed Mobility) up in chair  -KJ       Row Name 10/28/24 1000          Sit-Stand Transfer    Sit-Stand [place] (Transfers) independent  -KJ       Row Name 10/28/24 1000          Stand-Sit Transfer    Stand-Sit [place] (Transfers) independent  -KJ       Row Name 10/28/24 1000          Gait/Stairs (Locomotion)     [place] Level (Gait) modified independence  -KJ     Distance in Feet (Gait) 400  -KJ     Deviations/Abnormal Patterns (Gait) gait speed decreased  -KJ       Row Name 10/28/24 1000          Motor Skills    Comments, Therapeutic Exercise HEP given sitting, supine, and standing ex's  -KJ       Row Name 10/28/24 1000          Positioning and Restraints    Pre-Treatment Position sitting in chair/recliner  -KJ     Post Treatment Position chair  -KJ               User Key  (r) = Recorded By, (t) = Taken By, (c) = Cosigned By      Initials Name Provider Type    Maddison Mckinney, PTA Physical Therapist Assistant                    Physical Therapy Education       Title: PT OT SLP Therapies (Done)       Topic: Physical Therapy (Done)       Point: Mobility training (Done)       Learning Progress Summary            Patient Acceptance, E, VU,NR by JEANETTE at 10/25/2024 1435    Comment: gait, safety    Acceptance, E, VU by TALIA at 10/24/2024 1139    Comment: role of skilled PT, low fall risk, beginning HEP                      Point: Home exercise program (Done)       Learning Progress Summary            Patient Acceptance, E, VU by TALIA at 10/24/2024 1139    Comment: role of skilled PT, low fall risk, beginning HEP                      Point: Body mechanics (Done)       Learning Progress Summary            Patient Acceptance, E, VU by TALIA at 10/24/2024 1139    Comment: role of skilled PT, low fall risk, beginning HEP                      Point: Precautions (Done)       Learning Progress Summary            Patient Acceptance, E, VU by TALIA at 10/24/2024 1139    Comment: role of skilled PT, low fall risk, beginning HEP                                      User Key       Initials Effective Dates Name Provider Type Discipline    JEANETTE 02/03/23 -  Steven Layne PTA Physical Therapist Assistant PT    TALIA 08/15/24 -  Rodger Raines PT [id number] Physical Therapist PT                  PT Recommendation and Plan         Outcome Measures       Row  Name 10/25/24 1435             How much help from another person do you currently need...    Turning from your back to your side while in flat bed without using bedrails? 3  -JEANETTE      Moving from lying on back to sitting on the side of a flat bed without bedrails? 3  -JEANETTE      Moving to and from a bed to a chair (including a wheelchair)? 3  -JEANETTE      Standing up from a chair using your arms (e.g., wheelchair, bedside chair)? 3  -JEANETTE      Climbing 3-5 steps with a railing? 2  -JEANETTE      To walk in hospital room? 3  -JEANETTE      AM-PAC 6 Clicks Score (PT) 17  -JEANETTE         Functional Assessment    Outcome Measure Options AM-PAC 6 Clicks Basic Mobility (PT)  -JEANETTE                User Key  (r) = Recorded By, (t) = Taken By, (c) = Cosigned By      Initials Name Provider Type    Steven Betancourt, ANAYA Physical Therapist Assistant                     Time Calculation:    PT Charges       Row Name 10/28/24 1033             Time Calculation    Start Time 1000  -KJ      Stop Time 1030  -KJ      Time Calculation (min) 30 min  -KJ      PT Received On 10/28/24  -KJ      PT Goal Re-Cert Due Date 11/03/24  -KJ         Time Calculation- PT    Total Timed Code Minutes- PT 30 minute(s)  -KJ                User Key  (r) = Recorded By, (t) = Taken By, (c) = Cosigned By      Initials Name Provider Type    Maddison Mckinney PTA Physical Therapist Assistant                      PT G-Codes  Outcome Measure Options: AM-PAC 6 Clicks Basic Mobility (PT)  AM-PAC 6 Clicks Score (PT): 17    Maddison Richter PTA  10/28/2024

## 2024-10-28 NOTE — DISCHARGE SUMMARY
[facility] Medicine Services  DISCHARGE SUMMARY       Date of Admission: 10/23/2024  Date of Discharge:  10/28/2024  Primary Care Physician: Norberto Candelario MD    Presenting Problem/History of Present Illness:  Swollen and reddened arms, yellow eyes     Final Discharge Diagnoses:   Acute alcoholic hepatitis      Anasarca      Alcohol abuse      Cellulitis both upper extremeties      Hyperbilirubinemia      Chronic atrial fibrillation      Presence of Watchman left atrial appendage closure device      Permanent atrial fibrillation        Consults: GI    Procedures Performed: none    Pertinent Test Results:   Imaging Results (Last 7 Days)       Procedure Component Value Units Date/Time    US Abdomen Limited [[id number]] Collected: 10/25/24 0902     Updated: 10/25/24 0908    Narrative:      US ABDOMEN LIMITED-10/25/2024 7:15 AM     REASON FOR EXAM: elevated liver enzymes, alcoholic hepatitis;  Z74.09-Other reduced mobility       COMPARISON: Abdominal CT dated 6/23/2023     TECHNIQUE: Multiple longitudinal and transverse realtime sonographic  images of the right upper quadrant of the abdomen are obtained.  Ultrasound images and report are permanently stored in the PACS.     FINDINGS:    Pancreas: Included portions of the pancreatic head, neck and body are  unremarkable. The tail of the pancreas is obscured from view.     Liver: Liver is diffusely echogenic and difficult to penetrate. Deep  portions of the liver are obscured from assessment by beam attenuation.  Liver surface contour appears smooth. Portal veins are patent with  hepatopetal flow. No obvious focal lesion.     Gallbladder: No intraluminal echoes, wall thickening, or pericholecystic  fluid.     Bile ducts: The CBD measures 0.7 cm in diameter. There is no  intrahepatic or extrahepatic ductal dilation.     Right kidney: Normal in size, shape and contour. No mass, hydronephrosis  or calculi. No perinephric fluid  collection.       Other: No ascites.       Impression:      1. Liver appears enlarged and diffusely echogenic (likely steatosis).  Deep portions of the liver are poorly assessed due to attenuation of the  ultrasound beam.  2. Gallbladder and biliary tree are unremarkable.  3. No ascites.           This report was signed and finalized on 10/25/2024 9:05 AM by Dr Yoni Malin.             Lab Results (last 7 days)       Procedure Component Value Units Date/Time    Vitamin B12 [[id number]]  (Abnormal) Collected: 10/27/24 2143    Specimen: Blood Updated: 10/28/24 1206     Vitamin B-12 >2,000 pg/mL     Narrative:      Results may be falsely increased if patient taking Biotin.      Folate [[id number]]  (Normal) Collected: 10/27/24 2143    Specimen: Blood Updated: 10/28/24 1206     Folate 16.40 ng/mL     Narrative:      Results may be falsely increased if patient taking Biotin.      Haptoglobin [[id number]]  (Abnormal) Collected: 10/27/24 2143    Specimen: Blood Updated: 10/28/24 1201     Haptoglobin 16 mg/dL      Comment: Specimen hemolyzed. Results may be affected.       Comprehensive Metabolic Panel [[id number]]  (Abnormal) Collected: 10/28/24 0155    Specimen: Blood Updated: 10/28/24 0308     Glucose 138 mg/dL      BUN 14 mg/dL      Creatinine 1.37 mg/dL      Sodium 134 mmol/L      Potassium 4.1 mmol/L      Comment: Slight hemolysis detected by analyzer. Result may be falsely elevated.        Chloride 95 mmol/L      CO2 31.0 mmol/L      Calcium 6.9 mg/dL      Total Protein 4.6 g/dL      Albumin 2.8 g/dL      ALT (SGPT) <5 U/L      AST (SGOT) 116 U/L      Alkaline Phosphatase 213 U/L      Total Bilirubin 6.5 mg/dL      Globulin 1.8 gm/dL      A/G Ratio 1.6 g/dL      BUN/Creatinine Ratio 10.2     Anion Gap 8.0 mmol/L      eGFR 52.8 mL/min/1.73     Narrative:      GFR Normal >60  Chronic Kidney Disease <60  Kidney Failure <15    The GFR formula is only valid for adults with stable renal function between ages 18 and 70.     Equal Mix, PT / INR [[id number]]  (Normal) Collected: 10/27/24 2143    Specimen: Blood Updated: 10/27/24 2308     PT Mix w Plasma 13.8 Seconds      Comment: The presence of anticoagulant inhibitor drugs such as heparin or direct thrombin inhibitors cannot be excluded.       Mixing Study [[id number]]  (Abnormal) Collected: 10/27/24 2143    Specimen: Blood Updated: 10/27/24 2228     Protime 18.6 Seconds      INR 1.49     PTT 35.4 seconds      Comment: Plasma Mixing study not indicated, within Normal Ranges.       Iron Profile [[id number]]  (Abnormal) Collected: 10/27/24 1936    Specimen: Blood Updated: 10/27/24 2140     Iron 74 mcg/dL      Iron Saturation (TSAT) 36 %      Transferrin 138 mg/dL      TIBC 206 mcg/dL     Ferritin [[id number]]  (Abnormal) Collected: 10/27/24 1936    Specimen: Blood Updated: 10/27/24 2140     Ferritin 855.60 ng/mL     Narrative:      Results may be falsely decreased if patient taking Biotin.      Lactate Dehydrogenase [[id number]]  (Abnormal) Collected: 10/27/24 1936    Specimen: Blood Updated: 10/27/24 2140      U/L     Potassium [[id number]]  (Normal) Collected: 10/27/24 1936    Specimen: Blood Updated: 10/27/24 2019     Potassium 4.2 mmol/L     Comprehensive Metabolic Panel [[id number]]  (Abnormal) Collected: 10/27/24 0415    Specimen: Blood Updated: 10/27/24 0523     Glucose 135 mg/dL      BUN 9 mg/dL      Creatinine 1.21 mg/dL      Sodium 137 mmol/L      Potassium 3.5 mmol/L      Chloride 94 mmol/L      CO2 29.0 mmol/L      Calcium 7.1 mg/dL      Total Protein 4.3 g/dL      Albumin 2.4 g/dL      ALT (SGPT) <5 U/L      AST (SGOT) 111 U/L      Alkaline Phosphatase 210 U/L      Total Bilirubin 6.5 mg/dL      Globulin 1.9 gm/dL      A/G Ratio 1.3 g/dL      BUN/Creatinine Ratio 7.4     Anion Gap 14.0 mmol/L      eGFR 61.3 mL/min/1.73     Narrative:      GFR Normal >60  Chronic Kidney Disease <60  Kidney Failure <15    The GFR formula is only valid for adults with stable renal function  between ages 18 and 70.    Protime-INR [[id number]]  (Abnormal) Collected: 10/27/24 0415    Specimen: Blood Updated: 10/27/24 0512     Protime 26.1 Seconds      INR 2.28    Protime-INR [[id number]]  (Abnormal) Collected: 10/26/24 1059    Specimen: Blood Updated: 10/26/24 1141     Protime 41.3 Seconds      INR 4.10    Comprehensive Metabolic Panel [[id number]]  (Abnormal) Collected: 10/26/24 0519    Specimen: Blood Updated: 10/26/24 0613     Glucose 81 mg/dL      BUN 7 mg/dL      Creatinine 1.01 mg/dL      Sodium 139 mmol/L      Potassium 3.7 mmol/L      Chloride 96 mmol/L      CO2 33.0 mmol/L      Calcium 7.2 mg/dL      Total Protein 4.6 g/dL      Albumin 2.7 g/dL      ALT (SGPT) 11 U/L      AST (SGOT) 115 U/L      Alkaline Phosphatase 211 U/L      Total Bilirubin 7.3 mg/dL      Globulin 1.9 gm/dL      A/G Ratio 1.4 g/dL      BUN/Creatinine Ratio 6.9     Anion Gap 10.0 mmol/L      eGFR 76.1 mL/min/1.73     Narrative:      GFR Normal >60  Chronic Kidney Disease <60  Kidney Failure <15    The GFR formula is only valid for adults with stable renal function between ages 18 and 70.    Protime-INR [[id number]]  (Abnormal) Collected: 10/26/24 0519    Specimen: Blood Updated: 10/26/24 0605     Protime 38.2 Seconds      INR 3.71    Potassium [[id number]]  (Normal) Collected: 10/25/24 1640    Specimen: Blood Updated: 10/25/24 1659     Potassium 4.2 mmol/L      Comment: Slight hemolysis detected by analyzer. Result may be falsely elevated.       Comprehensive Metabolic Panel [[id number]]  (Abnormal) Collected: 10/25/24 0723    Specimen: Blood Updated: 10/25/24 0803     Glucose 114 mg/dL      BUN 7 mg/dL      Creatinine 1.09 mg/dL      Sodium 134 mmol/L      Potassium 3.4 mmol/L      Chloride 91 mmol/L      CO2 29.0 mmol/L      Calcium 7.6 mg/dL      Total Protein 4.8 g/dL      Albumin 2.8 g/dL      ALT (SGPT) 23 U/L      AST (SGOT) 119 U/L      Alkaline Phosphatase 240 U/L      Total Bilirubin 8.5 mg/dL      Globulin 2.0 gm/dL       A/G Ratio 1.4 g/dL      BUN/Creatinine Ratio 6.4     Anion Gap 14.0 mmol/L      eGFR 69.5 mL/min/1.73     Narrative:      GFR Normal >60  Chronic Kidney Disease <60  Kidney Failure <15    The GFR formula is only valid for adults with stable renal function between ages 18 and 70.    Protime-INR [[id number]]  (Abnormal) Collected: 10/25/24 0723    Specimen: Blood Updated: 10/25/24 0752     Protime 24.3 Seconds      INR 2.09    LABS SCANNED [[id number]] Resulted: 10/23/24     Updated: 10/25/24 0321    Ceruloplasmin [[id number]]  (Normal) Collected: 10/23/24 1917    Specimen: Blood Updated: 10/24/24 1540     Ceruloplasmin 26 mg/dL     Hepatitis Panel, Acute [[id number]]  (Normal) Collected: 10/24/24 1228    Specimen: Blood Updated: 10/24/24 1304     Hepatitis B Surface Ag Non-Reactive     Hep A IgM Non-Reactive     Hep B C IgM Non-Reactive     Hepatitis C Ab Non-Reactive    Narrative:      Results may be falsely decreased if patient taking Biotin.     Comprehensive Metabolic Panel [[id number]]  (Abnormal) Collected: 10/24/24 0810    Specimen: Blood Updated: 10/24/24 0841     Glucose 121 mg/dL      BUN 5 mg/dL      Creatinine 0.54 mg/dL      Sodium 133 mmol/L      Potassium 4.9 mmol/L      Comment: Specimen hemolyzed.  Result may be falsely elevated.        Chloride 93 mmol/L      CO2 28.0 mmol/L      Calcium 7.8 mg/dL      Total Protein 4.8 g/dL      Albumin 2.8 g/dL      ALT (SGPT) 40 U/L      Comment: Specimen hemolyzed.  Result may  be falsely elevated.        AST (SGOT) 129 U/L      Comment: Specimen hemolyzed.  Result may be falsely elevated.        Alkaline Phosphatase 237 U/L      Total Bilirubin 9.3 mg/dL      Globulin 2.0 gm/dL      A/G Ratio 1.4 g/dL      BUN/Creatinine Ratio 9.3     Anion Gap 12.0 mmol/L      eGFR 102.0 mL/min/1.73     Narrative:      GFR Normal >60  Chronic Kidney Disease <60  Kidney Failure <15    The GFR formula is only valid for adults with stable renal function between ages 18 and 70.    TSH  [[id number]]  (Abnormal) Collected: 10/23/24 1917    Specimen: Blood Updated: 10/23/24 1954     TSH 5.740 uIU/mL     Comprehensive Metabolic Panel [[id number]]  (Abnormal) Collected: 10/23/24 1917    Specimen: Blood Updated: 10/23/24 1950     Glucose 125 mg/dL      BUN 4 mg/dL      Creatinine 0.53 mg/dL      Sodium 134 mmol/L      Potassium 3.5 mmol/L      Comment: Slight hemolysis detected by analyzer. Result may be falsely elevated.        Chloride 90 mmol/L      CO2 32.0 mmol/L      Calcium 8.0 mg/dL      Total Protein 5.4 g/dL      Albumin 3.3 g/dL      ALT (SGPT) 48 U/L      AST (SGOT) 136 U/L      Alkaline Phosphatase 268 U/L      Total Bilirubin 10.6 mg/dL      Globulin 2.1 gm/dL      A/G Ratio 1.6 g/dL      BUN/Creatinine Ratio 7.5     Anion Gap 12.0 mmol/L      eGFR 102.6 mL/min/1.73     Narrative:      GFR Normal >60  Chronic Kidney Disease <60  Kidney Failure <15    The GFR formula is only valid for adults with stable renal function between ages 18 and 70.    Bilirubin, Direct [[id number]]  (Abnormal) Collected: 10/23/24 1917    Specimen: Blood Updated: 10/23/24 1950     Bilirubin, Direct 7.7 mg/dL     Protime-INR [[id number]]  (Abnormal) Collected: 10/23/24 1917    Specimen: Blood Updated: 10/23/24 1937     Protime 19.9 Seconds      INR 1.62    CBC Auto Differential [[id number]]  (Abnormal) Collected: 10/23/24 1917    Specimen: Blood Updated: 10/23/24 1935     WBC 7.56 10*3/mm3      RBC 3.24 10*6/mm3      Hemoglobin 12.0 g/dL      Hematocrit 34.3 %      .9 fL      MCH 37.0 pg      MCHC 35.0 g/dL      RDW 20.1 %      RDW-SD 75.7 fl      MPV 10.5 fL      Platelets 179 10*3/mm3      Neutrophil % 68.1 %      Lymphocyte % 21.8 %      Monocyte % 8.7 %      Eosinophil % 0.3 %      Basophil % 0.4 %      Immature Grans % 0.7 %      Neutrophils, Absolute 5.15 10*3/mm3      Lymphocytes, Absolute 1.65 10*3/mm3      Monocytes, Absolute 0.66 10*3/mm3      Eosinophils, Absolute 0.02 10*3/mm3      Basophils, Absolute  "0.03 10*3/mm3      Immature Grans, Absolute 0.05 10*3/mm3      nRBC 0.4 /100 WBC           Hospital Course:  The patient is a 79 y.o. male who presented to [facility] with bilateral arm swelling and yellow eyes and weight gain.  He was felt to have acute alcoholic hepatitis and cellulitis of the upper extremities.   He was admitted.  Started on Lasix drip and Cefazolin.   Gi was consulted.  Started on Pentoxifylline   Serial lab data followed.  Liver numbers worsened.   Started on steroids.  Liver numbers improved.   The swelling and redness of the patients arms improved.   He was changed to oral lasix.   He did well with this  PT  consulted.   Dr Valdez was consulted. Recomnedations for OP follow up with Aida SUNSHINE or Dr Rodriguez    Patient stable for dc and anxious to leave on the 28th.      Physical Exam on Discharge:  /72 (BP Location: Left arm, Patient Position: Sitting)   Pulse 98   Temp 97.4 °F (36.3 °C)   Resp 18   Ht 170.2 cm (67.01\")   Wt 105 kg (231 lb 12.8 oz)   SpO2 96%   BMI 36.30 kg/m²   Physical Exam  Vitals and nursing note reviewed.   Constitutional:       Appearance: He is obese.   HENT:      Head: Normocephalic and atraumatic.      Right Ear: External ear normal.      Left Ear: External ear normal.      Nose: Nose normal.      Mouth/Throat:      Mouth: Mucous membranes are moist.   Eyes:      Extraocular Movements: Extraocular movements intact.      Pupils: Pupils are equal, round, and reactive to light.   Cardiovascular:      Rate and Rhythm: Normal rate.      Pulses: Normal pulses.      Heart sounds: Normal heart sounds.   Pulmonary:      Effort: Pulmonary effort is normal.      Breath sounds: Normal breath sounds.   Abdominal:      General: Bowel sounds are normal.      Palpations: Abdomen is soft.   Musculoskeletal:         General: Normal range of motion.      Cervical back: No rigidity.   Skin:     General: Skin is warm and dry.      Capillary Refill: " Capillary refill takes less than 2 seconds.      Comments: Erythema has resolved   Neurological:      General: No focal deficit present.      Mental Status: He is alert and oriented to person, place, and time.   Psychiatric:         Mood and Affect: Mood normal.         Behavior: Behavior normal.         Condition on Discharge: Stable improved    Discharge Disposition:  Home or Self Care    Discharge Medications:     Discharge Medications        New Medications        Instructions Start Date   cefdinir 300 MG capsule  Commonly known as: OMNICEF   300 mg, Oral, Every 12 Hours Scheduled      folic acid 1 MG tablet  Commonly known as: FOLVITE   1 mg, Oral, Daily   Start Date: October 29, 2024     levothyroxine 50 MCG tablet  Commonly known as: SYNTHROID, LEVOTHROID   50 mcg, Oral, Every Early Morning   Start Date: October 29, 2024     prednisoLONE 15 MG/5ML solution oral solution  Commonly known as: PRELONE   40 mg, Oral, Daily With Breakfast      thiamine 100 MG tablet  Commonly known as: VITAMIN B1   100 mg, Oral, Daily   Start Date: October 29, 2024            Continue These Medications        Instructions Start Date   allopurinol 300 MG tablet  Commonly known as: ZYLOPRIM   300 mg, Every Morning      aspirin 81 MG EC tablet   81 mg, Oral, Daily      atorvastatin 40 MG tablet  Commonly known as: LIPITOR   20 mg, Nightly      empagliflozin 25 MG tablet tablet  Commonly known as: JARDIANCE   12.5 mg, Daily      ferrous sulfate 325 (65 FE) MG tablet   325 mg, Daily With Breakfast      furosemide 20 MG tablet  Commonly known as: LASIX   20 mg, Every Morning      metoprolol succinate  MG 24 hr tablet  Commonly known as: TOPROL-XL   50 mg, Every Morning      multivitamin with minerals tablet tablet   1 tablet, Oral, Every Morning      pantoprazole 40 MG EC tablet  Commonly known as: PROTONIX   40 mg, Every Morning      potassium chloride 20 MEQ CR tablet  Commonly known as: KLOR-CON M20   10 mEq, Daily       spironolactone 25 MG tablet  Commonly known as: ALDACTONE   25 mg, Every Morning      tamsulosin 0.4 MG capsule 24 hr capsule  Commonly known as: FLOMAX   1 capsule, Every Evening             Discharge Diet:   Diet Instructions       Diet: Regular/House Diet; Regular (IDDSI 7); Thin (IDDSI 0)      Discharge Diet: Regular/House Diet    Texture: Regular (IDDSI 7)    Fluid Consistency: Thin (IDDSI 0)          Activity at Discharge:   Activity Instructions       Activity as Tolerated              Follow-up Appointments:   Future Appointments   Date Time Provider Department Center   11/1/2024  2:30 PM Jenny Rocha APRN [facility]   11/4/2024  1:30 PM Kaz Rolle APRN [facility]   11/7/2024  1:45 PM  [facility]  [facility]   11/7/2024  2:15 PM Adia Harris APRN [facility]   3/24/2025  7:15 AM Norberto Candelario MD [facility]   5/22/2025  8:15 AM Daniel Clayton MD [facility] PAD       Test Results Pending at Discharge: none    Wanda Young DO  10/28/24  16:26 CDT    Time: 35 minutes.

## 2024-10-28 NOTE — OUTREACH NOTE
Prep Survey      Flowsheet Row Responses   [facility] patient discharged from? [place]   Is LACE score < 7 ? No   Eligibility [facility]   Date of Admission 10/23/24   Date of Discharge 10/28/24   Discharge diagnosis Acute alcoholic hepatitis   Does the patient have one of the following disease processes/diagnoses(primary or secondary)? Other   Prep survey completed? Yes            Maddison WELSH - Registered Nurse

## 2024-10-28 NOTE — PLAN OF CARE
Goal Outcome Evaluation:  Plan of Care Reviewed With: patient        Progress: improving  Outcome Evaluation: A&Ox4. Room air. VSS. Denies pain and nausea. IV to LFA IID. IV Lasix BID. Up with standby asst. Voiding via urinal. Repositioning self in bed. PO vitamin K added this shift per hematology. CIWA 0. Regular/Thin diet; tolerating. Takes medications whole. RUE dressed with abd pad x2, kerlex, and ace bandage. SCD'S. Bed-alarm. Call light within reach, safety maintained.

## 2024-10-28 NOTE — TELEPHONE ENCOUNTER
Caller: MARVIN    Relationship: [facility]     Best call back number: [phone]     What is the best time to reach you: ANYTIME IF NEEDED    Who are you requesting to speak with (clinical staff, provider,  specific staff member): SCHEDULING        What was the call regarding: MARVIN FROM [facility] CALLED TO REQUEST HOSPITAL FOLLOW UP IN APPROX 2 WEEKS WITH EITHER KARY GILBERT OR DR CARSON, PER DR BACON'S CONSULT NOTE.  HUB SCHEDULED NOV.7, PLEASE CHECK AND ADVISE IF THIS NEEDS TO BE ADJUSTED, OR IF APPT TYPE IS NOT CORRECT.  NOTE DID NOT INDICATE EXACT TIMEFRAME NEEDED.

## 2024-10-28 NOTE — CASE MANAGEMENT/SOCIAL WORK
Continued Stay Note  HARMONY Pimentel     Patient Name: Kristian Mir  MRN: [id number]  Today's Date: 10/28/2024    Admit Date: 10/23/2024    Plan: Home   Discharge Plan       Row Name 10/28/24 0645       Plan    Plan Home    Plan Comments Patient resides at home with his spouse and plans to return home upon discharge.  SW following for any possible needs.                   Discharge Codes    No documentation.                       LEESA Ham

## 2024-10-29 ENCOUNTER — TRANSITIONAL CARE MANAGEMENT TELEPHONE ENCOUNTER (OUTPATIENT)
Dept: CALL CENTER | Facility: HOSPITAL | Age: 79
End: 2024-10-29
Payer: COMMERCIAL

## 2024-10-29 NOTE — THERAPY DISCHARGE NOTE
Acute Care - Physical Therapy Discharge Summary  [facility]       Patient Name: Kristian Mir  : 1945  MRN: [id number]    Today's Date: 10/29/2024                 Admit Date: 10/23/2024      PT Recommendation and Plan    Visit Dx:    ICD-10-CM ICD-9-CM   1. Impaired mobility [Z74.09]  Z74.09 799.89                PT Rehab Goals       Row Name 10/29/24 1601             Bed Mobility Goal 1 (PT)    Activity/Assistive Device (Bed Mobility Goal 1, PT) bed mobility activities, all;rolling to left;rolling to right;supine to sit;sit to supine  -NW      [place] Level/Cues Needed (Bed Mobility Goal 1, PT) supervision required  -NW      Time Frame (Bed Mobility Goal 1, PT) long term goal (LTG);10 days  -NW      Progress/Outcomes (Bed Mobility Goal 1, PT) goal met  -NW         Transfer Goal 1 (PT)    Activity/Assistive Device (Transfer Goal 1, PT) sit-to-stand/stand-to-sit;bed-to-chair/chair-to-bed;toilet;car transfer;walk-in shower  -NW      [place] Level/Cues Needed (Transfer Goal 1, PT) independent  -NW      Time Frame (Transfer Goal 1, PT) long term goal (LTG);10 days  -NW      Strategies/Barriers (Transfers Goal 1, PT) pain under 3/10 in low back  -NW      Progress/Outcome (Transfer Goal 1, PT) goal met  -NW         Gait Training Goal 1 (PT)    Activity/Assistive Device (Gait Training Goal 1, PT) gait (walking locomotion);decrease asymmetrical patterns;decrease fall risk;diminish gait deviation;forward stepping;improve balance and speed;increase endurance/gait distance;increase energy conservation  -NW      [place] Level (Gait Training Goal 1, PT) standby assist  -NW      Distance (Gait Training Goal 1, PT) 350' as pt reports he wants to walk farther at home  -NW      Time Frame (Gait Training Goal 1, PT) long term goal (LTG);10 days  -NW      Progress/Outcome (Gait Training Goal 1, PT) goal met  -NW         Stairs Goal 1 (PT)    Activity/Assistive Device (Stairs Goal 1, PT) ascending  stairs;descending stairs;step-to-step;decrease fall risk;improve balance and speed  -NW      [place] Level/Cues Needed (Stairs Goal 1, PT) independent  -NW      Number of Stairs (Stairs Goal 1, PT) 3 small steps for home safety  -NW      Time Frame (Stairs Goal 1, PT) long term goal (LTG);10 days  -NW      Progress/Outcome (Stairs Goal 1, PT) goal not met  -NW         Patient Education Goal (PT)    Activity (Patient Education Goal, PT) Pt will demo independence with HEP as needed for returning to PLOF  -NW      [place]/Cues/Accuracy (Memory Goal 2, PT) demonstrates adequately;independent;verbalizes understanding  -NW      Time Frame (Patient Education Goal, PT) long term goal (LTG);10 days  -NW      Progress/Outcome (Patient Education Goal, PT) goal met  -NW                User Key  (r) = Recorded By, (t) = Taken By, (c) = Cosigned By      Initials Name Provider Type Discipline    NW Lachelle Hathaway PTA Physical Therapist Assistant PT                        PT Discharge Summary  Anticipated Discharge Disposition (PT): home  Reason for Discharge: Discharge from facility  Outcomes Achieved: Refer to plan of care for updates on goals achieved  Discharge Destination: Home      Lachelle Hathaway PTA   10/29/2024

## 2024-10-29 NOTE — OUTREACH NOTE
Call Center TCM Note      Flowsheet Row Responses   [facility] patient discharged from? [place]   Does the patient have one of the following disease processes/diagnoses(primary or secondary)? Other   TCM attempt successful? Yes   Call start time 1225   Call end time 1229   Discharge diagnosis Acute alcoholic hepatitis   Notified Case Management Script issues   Prescription comments States his medications were sent to the wrong pharmacy, will contact [facility] CM to get these transferred   Is the patient taking all medications as directed (includes completed medication regime)? Yes   Comments Hospital follow up appt with JONG Chamorro at PCP office for 11/1.   Does the patient have an appointment with their PCP within 7-14 days of discharge? Yes   Has home health visited the patient within 72 hours of discharge? N/A   Psychosocial issues? No   Did the patient receive a copy of their discharge instructions? Yes   Nursing interventions Reviewed instructions with patient   What is the patient's perception of their health status since discharge? Improving   Is the patient/caregiver able to teach back signs and symptoms related to disease process for when to call PCP? Yes   Is the patient/caregiver able to teach back signs and symptoms related to disease process for when to call 911? Yes   Is the patient/caregiver able to teach back the hierarchy of who to call/visit for symptoms/problems? PCP, Specialist, Home health nurse, Urgent Care, ED, 911 Yes   TCM call completed? Yes   Wrap up additional comments Doing well, all concerns addressed, will let inpatient CM know of pharmacy issue, confirmed hospital follow up appt with PCP office for 11/1.   Call end time 1229   Would this patient benefit from a Referral to [facility] Social Work? No   Is the patient interested in additional calls from an ambulatory ? No            Karla Raines RN    10/29/2024, 12:30 CDT

## 2024-11-01 ENCOUNTER — OFFICE VISIT (OUTPATIENT)
Dept: INTERNAL MEDICINE | Facility: CLINIC | Age: 79
End: 2024-11-01
Payer: MEDICARE

## 2024-11-01 VITALS
WEIGHT: 224 LBS | DIASTOLIC BLOOD PRESSURE: 56 MMHG | OXYGEN SATURATION: 100 % | BODY MASS INDEX: 35.16 KG/M2 | HEART RATE: 102 BPM | RESPIRATION RATE: 18 BRPM | SYSTOLIC BLOOD PRESSURE: 122 MMHG | TEMPERATURE: 97.3 F | HEIGHT: 67 IN

## 2024-11-01 DIAGNOSIS — E03.9 HYPOTHYROIDISM, UNSPECIFIED TYPE: ICD-10-CM

## 2024-11-01 DIAGNOSIS — E80.6 HYPERBILIRUBINEMIA: ICD-10-CM

## 2024-11-01 DIAGNOSIS — R60.1 ANASARCA: ICD-10-CM

## 2024-11-01 DIAGNOSIS — Z09 HOSPITAL DISCHARGE FOLLOW-UP: Primary | ICD-10-CM

## 2024-11-01 DIAGNOSIS — L03.119 CELLULITIS OF UPPER EXTREMITY, UNSPECIFIED LATERALITY: ICD-10-CM

## 2024-11-01 DIAGNOSIS — F10.11 HISTORY OF ALCOHOL ABUSE: ICD-10-CM

## 2024-11-01 DIAGNOSIS — K70.10 ACUTE ALCOHOLIC HEPATITIS: ICD-10-CM

## 2024-11-01 RX ORDER — PREDNISOLONE 15 MG/5ML
40 SOLUTION ORAL
Qty: 450 ML | Refills: 0 | Status: SHIPPED | OUTPATIENT
Start: 2024-11-01

## 2024-11-01 NOTE — PROGRESS NOTES
Transitional Care Follow Up Visit  Subjective     Kristian Mir is a 79 y.o. male who presents for a transitional care management visit.    Within 48 business hours after discharge our office contacted him via telephone to coordinate his care and needs.      I reviewed and discussed the details of that call along with the discharge summary, hospital problems, inpatient lab results, inpatient diagnostic studies, and consultation reports with Kristian.     Current outpatient and discharge medications have been reconciled for the patient.  Reviewed by: JONG Mayorga          10/28/2024     3:27 PM   Date of TCM Phone Call   Hospital [facility]   Date of Admission 10/23/2024   Date of Discharge 10/28/2024     Risk for Readmission (LACE) Score: 12 (10/28/2024  5:00 AM)      History of Present Illness   Course During Hospital Stay:  The patient is a 79 y.o. male who presented to [facility] with bilateral arm swelling and yellow eyes and weight gain.  He initially presented to [facility] who directed him to [facility] emergency room.  CT imaging of the chest and the abdomen revealed body wall edema but was otherwise unremarkable per report.  Doppler studies that were completed were negative for DVT in upper extremities.  Initially bilirubin was 10.6 alkaline phosphatase 268, , WBC count normal, platelets 179,000, albumin 3.3.  Hospital notes reveal long history of drinking daily beer up to 6 pack/day. Ron informs me today that  he had been drinking 3-4 vodka cranberries daily.  He states he has not had any alcohol since being hospitalized and does not intend to ever drink again.  He states he did not drink for any specific reason other than he has always been around people that drink and it was always just something he did.  He denies any underlying depression or issues with insomnia etc. that contributed to this.  He was felt to have acute alcoholic hepatitis and  cellulitis of the upper extremities.   He was admitted.  Started on Lasix drip and Cefazolin.   Gi was consulted.  Started on Pentoxifylline   Serial lab data followed.  Liver numbers worsened.   Started on steroids.  Liver numbers improved.   The swelling and redness of the patients arms improved.   He was changed to oral lasix.   He did well with this  PT  consulted.   Dr Valdez was consulted. Recomnedations for OP follow up with Adia SUNSHINE or Dr Rodriguez  He is also going to be following with gastroenterology, he confirms he is aware of upcoming appointment scheduled with Kaz Rolle on 11//24, he will be seeing Adia Harris on 11/7/2024, he expresses understanding about the importance of keeping these appointments.    Patient stable for dc and anxious to leave on the 28th.    Today in the office we reviewed his discharge medications, he is now aware he is to complete the Omnicef until he is out of it, I have suggested doing the same for right now with the thiamine and folic acid, additional recommendations will be made as warranted.  Same applies for levothyroxine his TSH was elevated at 5.740 during hospitalization, free T4 was never evaluated, he is on 50 mcg daily, I did educate him on symptoms of hyperthyroidism so that if these symptoms do were to occur he would call in the meantime, anticipate reevaluating thyroid function in about 5 weeks - order placed.      The following portions of the patient's history were reviewed and updated as appropriate: allergies, current medications, past family history, past medical history, past social history, past surgical history, and problem list.    Review of Systems    Objective   Physical Exam  Vitals and nursing note reviewed.   Constitutional:       General: He is not in acute distress.     Appearance: Normal appearance. He is obese. He is ill-appearing. He is not toxic-appearing or diaphoretic.   HENT:      Head: Normocephalic and atraumatic.   Eyes:       Extraocular Movements: Extraocular movements intact.      Conjunctiva/sclera: Conjunctivae normal.      Pupils: Pupils are equal, round, and reactive to light.   Cardiovascular:      Rate and Rhythm: Normal rate. Rhythm irregular.      Pulses: Normal pulses.      Heart sounds: Normal heart sounds.   Pulmonary:      Effort: Pulmonary effort is normal.      Breath sounds: Normal breath sounds.   Abdominal:      General: Bowel sounds are normal.      Palpations: Abdomen is soft.      Tenderness: There is no abdominal tenderness.   Musculoskeletal:         General: Normal range of motion.      Cervical back: Normal range of motion and neck supple.      Right lower leg: Edema (1+) present.      Left lower leg: Edema (1+) present.      Comments: Upper extremity swelling has improved dramatically per report, much less redness, peripheral pulses easily palpable   Skin:     General: Skin is warm and dry.      Coloration: Skin is jaundiced (Sclera -per report much improved).   Neurological:      General: No focal deficit present.      Mental Status: He is alert and oriented to person, place, and time. Mental status is at baseline.      Gait: Gait normal.   Psychiatric:         Mood and Affect: Mood normal.         Behavior: Behavior normal.         Thought Content: Thought content normal.         Judgment: Judgment normal.         Assessment & Plan   Diagnoses and all orders for this visit:    1. Hospital discharge follow-up (Primary)    2. Acute alcoholic hepatitis  -     prednisoLONE (PRELONE) 15 MG/5ML solution oral solution; Take 13.33 mL by mouth Daily With Breakfast.  Dispense: 450 mL; Refill: 0    3. Anasarca    4. History of alcohol abuse    5. Cellulitis of upper extremity, unspecified laterality    6. Hyperbilirubinemia    7. Hypothyroidism, unspecified type  -     TSH Rfx On Abnormal To Free T4; Future           Plan of care and recent hospitalization reviewed with Ron as well as his wife  He states he has had  decent appetite, reports appropriate urinary function/ability, reports bowel movements have been regular/normal.  He states his lower extremity swelling gets better throughout the night and worsens throughout the day when he is up on his legs.  He states he elevates when he is sedentary typically.  He is going to continue with the spironolactone and furosemide as well as Jardiance.  I discussed with him the importance of adhering to a nutrient dense diet, trying to avoid excessive sodium intake and stay active as possible.  Encouraged appropriate hydration.      Today in the office we reviewed his discharge medications, he is now aware he is to complete the Omnicef until he is out of it (arms axel much improved), I have suggested doing the same for right now with the thiamine and folic acid, additional recommendations will be made as warranted.  Same applies for levothyroxine his TSH was elevated at 5.740 during hospitalization, free T4 was never evaluated, he is on 50 mcg daily now since stay at hospital, I did educate him on symptoms of hyperthyroidism so that if these symptoms do were to occur he would call in the meantime, anticipate reevaluating thyroid function in about 5 weeks - order placed.  [facility]. I will send an updated prescription to the [facility] pharmacy in [facility] that he has requested so that he can stay on it as it was recommended by gastroenterologist Dr. Perdomo during hospitalization, per his progress note he recommended staying on the prednisolone for 28 days total.    He is also going to be following with gastroenterology, he confirms he is aware of upcoming appointment scheduled with Kaz Rolle on 11//24, he will be seeing Adia Harris on 11/7/2024, he expresses understanding about the importance of keeping these appointments.  As his labs were recently completed on 10/28/2024 and overall he is improving per report, I am  going to defer additional labs for next week as I do anticipate that both hematology and gastroenterology may want to reevaluate and may have additional labs they want to test for specifically.    Ron informs me today that  he had been drinking 3-4 vodka cranberries daily.  He states he has not had any alcohol since being hospitalized and does not intend to ever drink again.  He states he did not drink for any specific reason other than he has always been around people that drink and it was always just something he did.  He denies any underlying depression or issues with insomnia etc. that contributed to this.  Encouraged to continue to avoid alcohol.    Encouraged to return to the office in 4 weeks for reevaluation which would be an appropriate timeframe to reevaluate thyroid labs and any other labs that may need reassessment at that time.  He has been encouraged to call with any acute concerns or complaints in the meantime.    Please note that portions of this note were completed with a voice recognition program.     Electronically signed by JONG Mayorga, 11/01/24, 17:13 CDT.

## 2024-11-04 ENCOUNTER — LAB (OUTPATIENT)
Dept: LAB | Facility: HOSPITAL | Age: 79
End: 2024-11-04
Payer: MEDICARE

## 2024-11-04 ENCOUNTER — OFFICE VISIT (OUTPATIENT)
Dept: GASTROENTEROLOGY | Facility: CLINIC | Age: 79
End: 2024-11-04
Payer: MEDICARE

## 2024-11-04 VITALS
BODY MASS INDEX: 35.63 KG/M2 | DIASTOLIC BLOOD PRESSURE: 76 MMHG | WEIGHT: 227 LBS | TEMPERATURE: 97 F | HEIGHT: 67 IN | OXYGEN SATURATION: 98 % | SYSTOLIC BLOOD PRESSURE: 124 MMHG

## 2024-11-04 DIAGNOSIS — E80.6 HYPERBILIRUBINEMIA: ICD-10-CM

## 2024-11-04 DIAGNOSIS — E80.6 HYPERBILIRUBINEMIA: Primary | ICD-10-CM

## 2024-11-04 DIAGNOSIS — R79.1 ABNORMAL COAGULATION PROFILE: ICD-10-CM

## 2024-11-04 LAB
ALBUMIN SERPL-MCNC: 3.2 G/DL (ref 3.5–5.2)
ALBUMIN/GLOB SERPL: 1.4 G/DL
ALP SERPL-CCNC: 177 U/L (ref 39–117)
ALT SERPL W P-5'-P-CCNC: 33 U/L (ref 1–41)
ANION GAP SERPL CALCULATED.3IONS-SCNC: 10 MMOL/L (ref 5–15)
AST SERPL-CCNC: 121 U/L (ref 1–40)
BILIRUB SERPL-MCNC: 5.7 MG/DL (ref 0–1.2)
BUN SERPL-MCNC: 8 MG/DL (ref 8–23)
BUN/CREAT SERPL: 10.1 (ref 7–25)
CALCIUM SPEC-SCNC: 8 MG/DL (ref 8.6–10.5)
CHLORIDE SERPL-SCNC: 103 MMOL/L (ref 98–107)
CO2 SERPL-SCNC: 26 MMOL/L (ref 22–29)
CREAT SERPL-MCNC: 0.79 MG/DL (ref 0.76–1.27)
DEPRECATED RDW RBC AUTO: 83.9 FL (ref 37–54)
EGFRCR SERPLBLD CKD-EPI 2021: 90.9 ML/MIN/1.73
ERYTHROCYTE [DISTWIDTH] IN BLOOD BY AUTOMATED COUNT: 19.3 % (ref 12.3–15.4)
GLOBULIN UR ELPH-MCNC: 2.3 GM/DL
GLUCOSE SERPL-MCNC: 114 MG/DL (ref 65–99)
HCT VFR BLD AUTO: 32.6 % (ref 37.5–51)
HGB BLD-MCNC: 10.7 G/DL (ref 13–17.7)
INR PPP: 1.17 (ref 0.91–1.09)
MCH RBC QN AUTO: 38.5 PG (ref 26.6–33)
MCHC RBC AUTO-ENTMCNC: 32.8 G/DL (ref 31.5–35.7)
MCV RBC AUTO: 117.3 FL (ref 79–97)
PLATELET # BLD AUTO: 142 10*3/MM3 (ref 140–450)
PMV BLD AUTO: 11 FL (ref 6–12)
POTASSIUM SERPL-SCNC: 3.9 MMOL/L (ref 3.5–5.2)
PROT SERPL-MCNC: 5.5 G/DL (ref 6–8.5)
PROTHROMBIN TIME: 15.4 SECONDS (ref 11.8–14.8)
RBC # BLD AUTO: 2.78 10*6/MM3 (ref 4.14–5.8)
SODIUM SERPL-SCNC: 139 MMOL/L (ref 136–145)
WBC NRBC COR # BLD AUTO: 6.91 10*3/MM3 (ref 3.4–10.8)

## 2024-11-04 PROCEDURE — 85610 PROTHROMBIN TIME: CPT

## 2024-11-04 PROCEDURE — 3078F DIAST BP <80 MM HG: CPT | Performed by: NURSE PRACTITIONER

## 2024-11-04 PROCEDURE — 99214 OFFICE O/P EST MOD 30 MIN: CPT | Performed by: NURSE PRACTITIONER

## 2024-11-04 PROCEDURE — 85027 COMPLETE CBC AUTOMATED: CPT

## 2024-11-04 PROCEDURE — 36415 COLL VENOUS BLD VENIPUNCTURE: CPT

## 2024-11-04 PROCEDURE — 3074F SYST BP LT 130 MM HG: CPT | Performed by: NURSE PRACTITIONER

## 2024-11-04 PROCEDURE — 80053 COMPREHEN METABOLIC PANEL: CPT

## 2024-11-04 NOTE — PROGRESS NOTES
Chief Complaint   Patient presents with    Edema     Was in hospital with fluid       PCP: Norberto Candelario MD  REFER: No ref. provider found    Subjective     HPI    Kristian Mir was hospitalized at [facility] 10/23-10/28/24 for alcohol abuse, anasarca.  He has history of daily alcohol use since teenager.  In the hospital he was started on Penotoxifylline and steroids (he has not taken [facility] since [facility] not [facility] them).  Bilirubin trended down during hospitalization.  He has not had alcohol since prior to hospital admission and does not plan to drink again.         Past Medical History:   Diagnosis Date    Arrhythmia     Arthritis     Asthma     Atrial fibrillation     Cancer     skin    Chronic anticoagulation     Clotting disorder     COPD (chronic obstructive pulmonary disease)     GERD (gastroesophageal reflux disease)     History of transfusion     1992 bleeding ulcers dr gonzalez    Hyperlipidemia     Hypertension     Non-ischemic cardiomyopathy 10/14/2021       Past Surgical History:   Procedure Laterality Date    ATRIAL APPENDAGE EXCLUSION LEFT WITH TRANSESOPHAGEAL ECHOCARDIOGRAM Right 09/12/2023    Procedure: Atrial Appendage Occlusion;  Surgeon: Daniel Osman MD;  Location: [facility] CATH INVASIVE LOCATION;  Service: Cardiology;  Laterality: Right;    CARDIAC CATHETERIZATION      CARDIOVERSION      10/21 and 11/21 dr osman Negley    CATARACT EXTRACTION Bilateral     laser juanita vallejo Negley    COLONOSCOPY N/A 04/15/2022    Procedure: COLONOSCOPY WITH ANESTHESIA;  Surgeon: Fly Seymour DO;  Location: [facility] ENDOSCOPY;  Service: Gastroenterology;  Laterality: N/A;  pre anemia, screen  post polyps, diverticulosis  Norberto Hannah MD    COLONOSCOPY N/A 06/30/2023    Procedure: COLONOSCOPY WITH ANESTHESIA;  Surgeon: Fly Seymour DO;  Location: [facility] ENDOSCOPY;  Service: Gastroenterology;  Laterality: N/A;  Pre: Anemia;  Post:  Diverticulosis;  Norberto Candelario MD    ENDOSCOPY N/A 05/09/2023    Procedure: ESOPHAGOGASTRODUODENOSCOPY WITH ANESTHESIA;  Surgeon: Fly Seymour DO;  Location: [facility] ENDOSCOPY;  Service: Gastroenterology;  Laterality: N/A;  Pre: Anemia  Post: Duodenitis, Gastritis  Norberto Candelario MD    JOINT REPLACEMENT Bilateral     hip    SKIN CANCER EXCISION Right 10/2023    TOTAL SHOULDER ARTHROPLASTY W/ DISTAL CLAVICLE EXCISION Left 01/03/2023    Procedure: LEFT REVERSE TOTAL SHOULDER ARTHROPLASTY;  Surgeon: Callum Hernandez MD;  Location: [facility] OR;  Service: Orthopedics;  Laterality: Left;       Outpatient Medications Marked as Taking for the 11/4/24 encounter (Office Visit) with Kaz Rolle APRN   Medication Sig Dispense Refill    allopurinol (ZYLOPRIM) 300 MG tablet Take 1 tablet by mouth Every Morning.      aspirin 81 MG EC tablet Take 1 tablet by mouth Daily.      atorvastatin (LIPITOR) 40 MG tablet Take 0.5 tablets by mouth Every Night.      empagliflozin (JARDIANCE) 25 MG tablet tablet Take 0.5 tablets by mouth Daily.      ferrous sulfate 325 (65 FE) MG tablet Take 1 tablet by mouth Daily With Breakfast.      folic acid (FOLVITE) 1 MG tablet Take 1 tablet by mouth Daily. 30 tablet 0    furosemide (LASIX) 20 MG tablet Take 1 tablet by mouth Every Morning.      levothyroxine (SYNTHROID, LEVOTHROID) 50 MCG tablet Take 1 tablet by mouth Every Morning. 30 tablet 0    metoprolol succinate XL (TOPROL-XL) 100 MG 24 hr tablet Take 0.5 tablets by mouth Every Morning.      multivitamin with minerals tablet tablet Take 1 tablet by mouth Every Morning.      pantoprazole (PROTONIX) 40 MG EC tablet Take 1 tablet by mouth Every Morning.      potassium chloride (KLOR-CON M20) 20 MEQ CR tablet Take 0.5 tablets by mouth Daily.      prednisoLONE (PRELONE) 15 MG/5ML solution oral solution Take 13.33 mL by mouth Daily With Breakfast. 450 mL 0    spironolactone (ALDACTONE) 25 MG tablet Take 1 tablet by mouth  "Every Morning.      tamsulosin (FLOMAX) 0.4 MG capsule 24 hr capsule Take 1 capsule by mouth Every Evening.      thiamine (VITAMIN B1) 100 MG tablet Take 1 tablet by mouth Daily. 30 tablet 0       No Known Allergies    Social History     Socioeconomic History    Marital status:    Tobacco Use    Smoking status: Former     Current packs/day: 0.00     Types: Cigarettes     Quit date: 2005     Years since quittin.8     Passive exposure: Past    Smokeless tobacco: Never   Vaping Use    Vaping status: Never Used   Substance and Sexual Activity    Alcohol use: Not Currently     Alcohol/week: 11.0 standard drinks of alcohol     Types: 3 Cans of beer, 8 Shots of liquor per week     Comment: daily 2-3- vodka cranberry    Drug use: Never    Sexual activity: Not Currently     Partners: Female       Review of Systems   Constitutional:  Negative for fever and unexpected weight change.   HENT:  Negative for trouble swallowing.    Respiratory:  Negative for shortness of breath.    Cardiovascular:  Negative for chest pain.   Gastrointestinal:  Negative for abdominal pain and anal bleeding.       Objective     Vitals:    24 1327   BP: 124/76   Temp: 97 °F (36.1 °C)   SpO2: 98%   Weight: 103 kg (227 lb)   Height: 170.2 cm (67\")     Body mass index is 35.55 kg/m².    Physical Exam  Constitutional:       Appearance: Normal appearance. He is well-developed.   Eyes:      General: Scleral icterus present.   Cardiovascular:      Heart sounds: Normal heart sounds. No murmur heard.  Pulmonary:      Effort: Pulmonary effort is normal.   Abdominal:      General: Bowel sounds are normal. There is no distension.      Palpations: Abdomen is soft.      Tenderness: There is no abdominal tenderness. There is no guarding.   Skin:     General: Skin is warm and dry.      Coloration: Skin is jaundiced.   Neurological:      Mental Status: He is alert.   Psychiatric:         Behavior: Behavior is cooperative.         Imaging Results " (Most Recent)       None            Body mass index is 35.55 kg/m².    Assessment & Plan     Diagnoses and all orders for this visit:    1. Hyperbilirubinemia (Primary)  -     CBC (No Diff); Future  -     Comprehensive Metabolic Panel; Future  -     Protime-INR; Future    2. Abnormal coagulation profile  -     Protime-INR; Future         * Surgery not found *    Discriminant function based on labs dated 10/28/24 was 72.3 indicating a very poor prognosis.  Protime on 10/27/24 was elevated at 26.1, however, this had improved from 41.3.  he has not been on steroids for over a week, I recommend labs today, he start on steroid therapy as prescribed and I will discuss further treatment plans based on results of labs    Review of ultrasound does not show dilation of bile duct, Kristian Mir is not exhibiting pain.   Liver described as enlarged on ultrasound from 10/25/24.  CTE dated 6/23/2023 showed splenomegaly but no abnormality to liver.   Alcohol abstinence will be crucial to health/survival going forward.           Kaz Rolle, APRN  11/04/24          There are no Patient Instructions on file for this visit.

## 2024-11-05 DIAGNOSIS — D50.8 OTHER IRON DEFICIENCY ANEMIA: Primary | ICD-10-CM

## 2024-11-07 ENCOUNTER — OFFICE VISIT (OUTPATIENT)
Dept: ONCOLOGY | Facility: CLINIC | Age: 79
End: 2024-11-07
Payer: MEDICARE

## 2024-11-07 ENCOUNTER — LAB (OUTPATIENT)
Dept: LAB | Facility: HOSPITAL | Age: 79
End: 2024-11-07
Payer: MEDICARE

## 2024-11-07 VITALS
WEIGHT: 228.5 LBS | HEIGHT: 67 IN | TEMPERATURE: 97.6 F | BODY MASS INDEX: 35.87 KG/M2 | RESPIRATION RATE: 16 BRPM | HEART RATE: 73 BPM | OXYGEN SATURATION: 99 % | DIASTOLIC BLOOD PRESSURE: 84 MMHG | SYSTOLIC BLOOD PRESSURE: 122 MMHG

## 2024-11-07 DIAGNOSIS — D64.9 ANEMIA, UNSPECIFIED TYPE: ICD-10-CM

## 2024-11-07 DIAGNOSIS — R76.8 POSITIVE DIRECT ANTIGLOBULIN TEST (DAT): ICD-10-CM

## 2024-11-07 DIAGNOSIS — D50.8 OTHER IRON DEFICIENCY ANEMIA: Primary | ICD-10-CM

## 2024-11-07 DIAGNOSIS — K76.9 LIVER DISEASE: ICD-10-CM

## 2024-11-07 LAB
ALBUMIN SERPL-MCNC: 3.5 G/DL (ref 3.5–5.2)
ALBUMIN/GLOB SERPL: 1.6 G/DL
ALP SERPL-CCNC: 144 U/L (ref 39–117)
ALT SERPL W P-5'-P-CCNC: 38 U/L (ref 1–41)
ANION GAP SERPL CALCULATED.3IONS-SCNC: 10 MMOL/L (ref 5–15)
ANISOCYTOSIS BLD QL: ABNORMAL
AST SERPL-CCNC: 110 U/L (ref 1–40)
BASO STIPL COARSE BLD QL SMEAR: ABNORMAL
BASOPHILS # BLD MANUAL: 0 10*3/MM3 (ref 0–0.2)
BASOPHILS NFR BLD MANUAL: 0 % (ref 0–1.5)
BILIRUB SERPL-MCNC: 3.8 MG/DL (ref 0–1.2)
BUN SERPL-MCNC: 12 MG/DL (ref 8–23)
BUN/CREAT SERPL: 10.6 (ref 7–25)
CALCIUM SPEC-SCNC: 8.7 MG/DL (ref 8.6–10.5)
CHLORIDE SERPL-SCNC: 107 MMOL/L (ref 98–107)
CLUMPED PLATELETS: PRESENT
CO2 SERPL-SCNC: 25 MMOL/L (ref 22–29)
CREAT SERPL-MCNC: 1.13 MG/DL (ref 0.76–1.27)
DEPRECATED RDW RBC AUTO: 76.6 FL (ref 37–54)
EGFRCR SERPLBLD CKD-EPI 2021: 66.5 ML/MIN/1.73
EOSINOPHIL # BLD MANUAL: 0.24 10*3/MM3 (ref 0–0.4)
EOSINOPHIL NFR BLD MANUAL: 3.1 % (ref 0.3–6.2)
ERYTHROCYTE [DISTWIDTH] IN BLOOD BY AUTOMATED COUNT: 18.2 % (ref 12.3–15.4)
FERRITIN SERPL-MCNC: 586.7 NG/ML (ref 30–400)
GLOBULIN UR ELPH-MCNC: 2.2 GM/DL
GLUCOSE SERPL-MCNC: 91 MG/DL (ref 65–99)
HCT VFR BLD AUTO: 30.9 % (ref 37.5–51)
HGB BLD-MCNC: 10.2 G/DL (ref 13–17.7)
HOLD SPECIMEN: NORMAL
HYPOCHROMIA BLD QL: ABNORMAL
IRON 24H UR-MRATE: 59 MCG/DL (ref 59–158)
IRON SATN MFR SERPL: 23 % (ref 20–50)
LDH SERPL-CCNC: 371 U/L (ref 135–225)
LYMPHOCYTES # BLD MANUAL: 1.88 10*3/MM3 (ref 0.7–3.1)
LYMPHOCYTES NFR BLD MANUAL: 4.1 % (ref 5–12)
MACROCYTES BLD QL SMEAR: ABNORMAL
MCH RBC QN AUTO: 38.1 PG (ref 26.6–33)
MCHC RBC AUTO-ENTMCNC: 33 G/DL (ref 31.5–35.7)
MCV RBC AUTO: 115.3 FL (ref 79–97)
METAMYELOCYTES NFR BLD MANUAL: 1 % (ref 0–0)
MONOCYTES # BLD: 0.31 10*3/MM3 (ref 0.1–0.9)
NEUTROPHILS # BLD AUTO: 5.16 10*3/MM3 (ref 1.7–7)
NEUTROPHILS NFR BLD MANUAL: 67.3 % (ref 42.7–76)
NRBC SPEC MANUAL: 1 /100 WBC (ref 0–0.2)
OVALOCYTES BLD QL SMEAR: ABNORMAL
PLATELET # BLD AUTO: 174 10*3/MM3 (ref 140–450)
PMV BLD AUTO: 9.8 FL (ref 6–12)
POIKILOCYTOSIS BLD QL SMEAR: ABNORMAL
POTASSIUM SERPL-SCNC: 4 MMOL/L (ref 3.5–5.2)
PROT SERPL-MCNC: 5.7 G/DL (ref 6–8.5)
RBC # BLD AUTO: 2.68 10*6/MM3 (ref 4.14–5.8)
SODIUM SERPL-SCNC: 142 MMOL/L (ref 136–145)
TARGETS BLD QL SMEAR: ABNORMAL
TIBC SERPL-MCNC: 261 MCG/DL (ref 298–536)
TRANSFERRIN SERPL-MCNC: 175 MG/DL (ref 200–360)
VARIANT LYMPHS NFR BLD MANUAL: 21.4 % (ref 19.6–45.3)
VARIANT LYMPHS NFR BLD MANUAL: 3.1 % (ref 0–5)
WBC MORPH BLD: NORMAL
WBC NRBC COR # BLD AUTO: 7.66 10*3/MM3 (ref 3.4–10.8)
WHOLE BLOOD HOLD SPECIMEN: NORMAL

## 2024-11-07 PROCEDURE — 36415 COLL VENOUS BLD VENIPUNCTURE: CPT

## 2024-11-07 PROCEDURE — 83010 ASSAY OF HAPTOGLOBIN QUANT: CPT

## 2024-11-07 PROCEDURE — 85007 BL SMEAR W/DIFF WBC COUNT: CPT

## 2024-11-07 PROCEDURE — 84466 ASSAY OF TRANSFERRIN: CPT

## 2024-11-07 PROCEDURE — 83615 LACTATE (LD) (LDH) ENZYME: CPT

## 2024-11-07 PROCEDURE — 85025 COMPLETE CBC W/AUTO DIFF WBC: CPT

## 2024-11-07 PROCEDURE — 82728 ASSAY OF FERRITIN: CPT

## 2024-11-07 PROCEDURE — 82668 ASSAY OF ERYTHROPOIETIN: CPT

## 2024-11-07 PROCEDURE — 80053 COMPREHEN METABOLIC PANEL: CPT

## 2024-11-07 PROCEDURE — 83540 ASSAY OF IRON: CPT

## 2024-11-07 NOTE — PROGRESS NOTES
[facility] GROUP HEMATOLOGY & ONCOLOGY  [address]  [facility] [address] [phone]    Patient Name: Kristian Mir  Encounter Date: 11/07/2024   YOB: 1945  Patient Number: [id number]    Initial Note    HISTORY OF PRESENT ILLNESS: Kristian Mir is a 79 y.o. male was seen by Dr. Chavira in the hospital on 10/27/24 for anemia . History is obtained from patient and spouse. History is considered to be accurate.  History of Present Illness  The patient presents for evaluation of anemia. He is accompanied by an adult female.    He has a history of anemia and is currently taking iron supplements daily. Per patient, six months ago, he experienced significant blood loss and received 4 to 5 units of blood and intravenous iron. During his recent hospitalization, it was noted that his spleen was enlarged. He underwent two ultrasounds and an x-ray at [facility], the results of which were sent here. His bilirubin level was recorded as 5.7 on Monday. He had a consultation with Dr. Seymour on Monday and with his primary care physician's nurse last Friday. He has an upcoming appointment with Dr. Candelario in 2025 and will have blood work done in 6 to 7 weeks.    He was recently hospitalized due to fluid retention and was advised to abstain from alcohol, which he has done since 10/22/2024.    He has some heart issues and had a Watchman device implanted. He was taken off all blood thinners except baby aspirin 81 mg. He has been having high blood pressure for years and is on medication for it. He reports no heart failure but has an irregular heartbeat. He has an appointment with Dr. Clayton on 06/25/2025.    He has both hips replaced. He has had difficulty breathing for a long time. One of the doctors suggested checking for sleep apnea. He also has some swelling in his feet.    SOCIAL HISTORY  The patient quit drinking on 10/22/2024. He  used to smoke.              PAST MEDICAL HISTORY:  ALLERGIES:  No Known Allergies  CURRENT MEDICATIONS:  Outpatient Encounter Medications as of 11/7/2024   Medication Sig Dispense Refill    allopurinol (ZYLOPRIM) 300 MG tablet Take 1 tablet by mouth Every Morning.      aspirin 81 MG EC tablet Take 1 tablet by mouth Daily.      atorvastatin (LIPITOR) 40 MG tablet Take 0.5 tablets by mouth Every Night.      empagliflozin (JARDIANCE) 25 MG tablet tablet Take 0.5 tablets by mouth Daily.      ferrous sulfate 325 (65 FE) MG tablet Take 1 tablet by mouth Daily With Breakfast.      folic acid (FOLVITE) 1 MG tablet Take 1 tablet by mouth Daily. 30 tablet 0    furosemide (LASIX) 20 MG tablet Take 1 tablet by mouth Every Morning.      levothyroxine (SYNTHROID, LEVOTHROID) 50 MCG tablet Take 1 tablet by mouth Every Morning. 30 tablet 0    metoprolol succinate XL (TOPROL-XL) 100 MG 24 hr tablet Take 0.5 tablets by mouth Every Morning.      multivitamin with minerals tablet tablet Take 1 tablet by mouth Every Morning.      pantoprazole (PROTONIX) 40 MG EC tablet Take 1 tablet by mouth Every Morning.      potassium chloride (KLOR-CON M20) 20 MEQ CR tablet Take 0.5 tablets by mouth Daily.      prednisoLONE (PRELONE) 15 MG/5ML solution oral solution Take 13.33 mL by mouth Daily With Breakfast. 450 mL 0    spironolactone (ALDACTONE) 25 MG tablet Take 1 tablet by mouth Every Morning.      tamsulosin (FLOMAX) 0.4 MG capsule 24 hr capsule Take 1 capsule by mouth Every Evening.      thiamine (VITAMIN B1) 100 MG tablet Take 1 tablet by mouth Daily. 30 tablet 0     No facility-administered encounter medications on file as of 11/7/2024.     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    Skin abrasion T14.8XXA    Skin tear of right elbow without complication S51.011A    Venous insufficiency I87.2    Iron deficiency anemia D50.9    Chronic blood loss anemia D50.0    Chronic diastolic (congestive) heart failure I50.32    Chronic systolic heart failure I50.22    Presence of Watchman left atrial appendage closure device Z95.818    Acute alcoholic hepatitis K70.10    Anasarca R60.1    Alcohol abuse F10.10    Cellulitis both upper extremeties L03.90    Hyperbilirubinemia E80.6    Chronic atrial fibrillation I48.20    Acquired hypothyroidism E03.9       HEALTH MAINTENANCE ITEMS:  Health Maintenance Due   Topic Date Due    ZOSTER VACCINE (2 of 2) 10/22/2024       <no information>  Last Completed Colonoscopy            COLORECTAL CANCER SCREENING (COLONOSCOPY - Every 5 Years) Next due on 6/30/2028 07/23/2023  Fecal Occult Blood component of Occult Blood X 1, Stool - Stool, Per Rectum    06/30/2023  COLONOSCOPY    06/30/2023  Surgical Procedure: COLONOSCOPY    05/17/2023  POC Occult Blood X 3, Stool    04/15/2022  COLONOSCOPY    Only the first 5 history entries have been loaded, but more history exists.                  Immunization History   Administered Date(s) Administered    Arexvy (RSV, Adults 60+ yrs) 12/28/2023    COVID-19 (MODERNA) 1st,2nd,3rd Dose Monovalent 01/08/2021, 02/05/2021, 11/01/2022    COVID-19 (MODERNA) BIVALENT 12+YRS 11/01/2022    COVID-19 (MODERNA) Monovalent Original Booster 11/18/2021, 04/06/2022    COVID-19 (PFIZER) 12YRS+ (COMIRNATY) 11/22/2023, 09/16/2024    Fluad Quad 65+ 11/20/2020    Fluzone (or Fluarix & Flulaval for VFC) >6mos 11/03/2015    Fluzone High-Dose 65+YRS 11/07/2017, 11/08/2018, 12/11/2019    Fluzone High-Dose 65+yrs 10/26/2022, 09/21/2023    Pneumococcal Conjugate 13-Valent (PCV13) 12/11/2019    Pneumococcal Conjugate 20-Valent (PCV20) 09/21/2023    Shingrix  "2024    Tdap 05/15/2023     Last Completed Mammogram       This patient has no relevant Health Maintenance data.              FAMILY HISTORY:  Family History   Problem Relation Age of Onset    Cancer Mother     Lung cancer Mother         Smoker    Cervical cancer Mother     Heart disease Father     Heart failure Father     Colon cancer Brother     Colon polyps Neg Hx     Esophageal cancer Neg Hx      SOCIAL HISTORY:  Social History     Socioeconomic History    Marital status:    Tobacco Use    Smoking status: Former     Current packs/day: 0.00     Types: Cigarettes     Quit date: 2005     Years since quittin.8     Passive exposure: Past    Smokeless tobacco: Never   Vaping Use    Vaping status: Never Used   Substance and Sexual Activity    Alcohol use: Not Currently     Alcohol/week: 11.0 standard drinks of alcohol     Types: 3 Cans of beer, 8 Shots of liquor per week     Comment: daily 2-3- vodka cranberry    Drug use: Never    Sexual activity: Not Currently     Partners: Female       REVIEW OF SYSTEMS:  Review of Systems    /84   Pulse 73   Temp 97.6 °F (36.4 °C)   Resp 16   Ht 170.2 cm (67\")   Wt 104 kg (228 lb 8 oz)   SpO2 99%   BMI 35.79 kg/m²  Body surface area is 2.14 meters squared.    Pain Score    24 1431   PainSc: 0-No pain       Physical Exam:  Physical Exam  Constitutional:       Appearance: Normal appearance.   HENT:      Head: Normocephalic and atraumatic.   Cardiovascular:      Rate and Rhythm: Normal rate and regular rhythm.   Pulmonary:      Effort: Pulmonary effort is normal.      Breath sounds: Normal breath sounds.   Abdominal:      General: Bowel sounds are normal.      Palpations: Abdomen is soft.   Musculoskeletal:      Right lower leg: Edema present.      Left lower leg: Edema present.   Skin:     General: Skin is warm and dry.   Neurological:      Mental Status: He is alert and oriented to person, place, and time.   Psychiatric:         Attention and " Perception: Attention normal.         Mood and Affect: Mood normal.         Judgment: Judgment normal.         Kristian Mir reports a pain score of 0.  Given his pain assessment as noted, treatment options were discussed and the following options were decided upon as a follow-up plan to address the patient's pain:  NO intervention indicated.  .      ASSESSMENT / PLAN:  Recent Results (from the past week)   CBC (No Diff)    Collection Time: 11/04/24  2:31 PM    Specimen: Blood   Result Value Ref Range    WBC 6.91 3.40 - 10.80 10*3/mm3    RBC 2.78 (L) 4.14 - 5.80 10*6/mm3    Hemoglobin 10.7 (L) 13.0 - 17.7 g/dL    Hematocrit 32.6 (L) 37.5 - 51.0 %    .3 (H) 79.0 - 97.0 fL    MCH 38.5 (H) 26.6 - 33.0 pg    MCHC 32.8 31.5 - 35.7 g/dL    RDW 19.3 (H) 12.3 - 15.4 %    RDW-SD 83.9 (H) 37.0 - 54.0 fl    MPV 11.0 6.0 - 12.0 fL    Platelets 142 140 - 450 10*3/mm3   Comprehensive Metabolic Panel    Collection Time: 11/04/24  2:31 PM    Specimen: Blood   Result Value Ref Range    Glucose 114 (H) 65 - 99 mg/dL    BUN 8 8 - 23 mg/dL    Creatinine 0.79 0.76 - 1.27 mg/dL    Sodium 139 136 - 145 mmol/L    Potassium 3.9 3.5 - 5.2 mmol/L    Chloride 103 98 - 107 mmol/L    CO2 26.0 22.0 - 29.0 mmol/L    Calcium 8.0 (L) 8.6 - 10.5 mg/dL    Total Protein 5.5 (L) 6.0 - 8.5 g/dL    Albumin 3.2 (L) 3.5 - 5.2 g/dL    ALT (SGPT) 33 1 - 41 U/L    AST (SGOT) 121 (H) 1 - 40 U/L    Alkaline Phosphatase 177 (H) 39 - 117 U/L    Total Bilirubin 5.7 (H) 0.0 - 1.2 mg/dL    Globulin 2.3 gm/dL    A/G Ratio 1.4 g/dL    BUN/Creatinine Ratio 10.1 7.0 - 25.0    Anion Gap 10.0 5.0 - 15.0 mmol/L    eGFR 90.9 >60.0 mL/min/1.73   Protime-INR    Collection Time: 11/04/24  2:31 PM    Specimen: Blood   Result Value Ref Range    Protime 15.4 (H) 11.8 - 14.8 Seconds    INR 1.17 (H) 0.91 - 1.09   Comprehensive Metabolic Panel    Collection Time: 11/07/24  2:28 PM    Specimen: Arm, Left; Blood   Result Value Ref Range    Glucose 91 65 - 99 mg/dL    BUN 12 8  - 23 mg/dL    Creatinine 1.13 0.76 - 1.27 mg/dL    Sodium 142 136 - 145 mmol/L    Potassium 4.0 3.5 - 5.2 mmol/L    Chloride 107 98 - 107 mmol/L    CO2 25.0 22.0 - 29.0 mmol/L    Calcium 8.7 8.6 - 10.5 mg/dL    Total Protein 5.7 (L) 6.0 - 8.5 g/dL    Albumin 3.5 3.5 - 5.2 g/dL    ALT (SGPT) 38 1 - 41 U/L    AST (SGOT) 110 (H) 1 - 40 U/L    Alkaline Phosphatase 144 (H) 39 - 117 U/L    Total Bilirubin 3.8 (H) 0.0 - 1.2 mg/dL    Globulin 2.2 gm/dL    A/G Ratio 1.6 g/dL    BUN/Creatinine Ratio 10.6 7.0 - 25.0    Anion Gap 10.0 5.0 - 15.0 mmol/L    eGFR 66.5 >60.0 mL/min/1.73   CBC Auto Differential    Collection Time: 11/07/24  2:28 PM    Specimen: Arm, Left; Blood   Result Value Ref Range    WBC 7.66 3.40 - 10.80 10*3/mm3    RBC 2.68 (L) 4.14 - 5.80 10*6/mm3    Hemoglobin 10.2 (L) 13.0 - 17.7 g/dL    Hematocrit 30.9 (L) 37.5 - 51.0 %    .3 (H) 79.0 - 97.0 fL    MCH 38.1 (H) 26.6 - 33.0 pg    MCHC 33.0 31.5 - 35.7 g/dL    RDW 18.2 (H) 12.3 - 15.4 %    RDW-SD 76.6 (H) 37.0 - 54.0 fl    MPV 9.8 6.0 - 12.0 fL    Platelets 174 140 - 450 10*3/mm3   Lavender Top    Collection Time: 11/07/24  2:28 PM   Result Value Ref Range    Extra Tube hold for add-on    Lavender Top    Collection Time: 11/07/24  2:28 PM   Result Value Ref Range    Extra Tube hold for add-on    Lavender Top    Collection Time: 11/07/24  2:28 PM   Result Value Ref Range    Extra Tube hold for add-on    Grn Na Hep No Gel    Collection Time: 11/07/24  2:28 PM    Specimen: Arm, Left; Blood   Result Value Ref Range    Extra Tube Hold for add-ons.    Grn Na Hep No Gel    Collection Time: 11/07/24  2:28 PM    Specimen: Arm, Left; Blood   Result Value Ref Range    Extra Tube Hold for add-ons.    Gold Top - SST    Collection Time: 11/07/24  2:28 PM   Result Value Ref Range    Extra Tube Hold for add-ons.    Gold Top - SST    Collection Time: 11/07/24  2:28 PM   Result Value Ref Range    Extra Tube Hold for add-ons.    Gold Top - SST    Collection Time:  11/07/24  2:28 PM   Result Value Ref Range    Extra Tube Hold for add-ons.    Royal Blue EDTA    Collection Time: 11/07/24  2:28 PM    Specimen: Arm, Left; Blood   Result Value Ref Range    Extra Tube Hold for add-ons.    Manual Differential    Collection Time: 11/07/24  2:28 PM    Specimen: Arm, Left; Blood   Result Value Ref Range    Neutrophil % 67.3 42.7 - 76.0 %    Lymphocyte % 21.4 19.6 - 45.3 %    Monocyte % 4.1 (L) 5.0 - 12.0 %    Eosinophil % 3.1 0.3 - 6.2 %    Basophil % 0.0 0.0 - 1.5 %    Metamyelocyte % 1.0 (H) 0.0 - 0.0 %    Atypical Lymphocyte % 3.1 0.0 - 5.0 %    Neutrophils Absolute 5.16 1.70 - 7.00 10*3/mm3    Lymphocytes Absolute 1.88 0.70 - 3.10 10*3/mm3    Monocytes Absolute 0.31 0.10 - 0.90 10*3/mm3    Eosinophils Absolute 0.24 0.00 - 0.40 10*3/mm3    Basophils Absolute 0.00 0.00 - 0.20 10*3/mm3    nRBC 1.0 (H) 0.0 - 0.2 /100 WBC    Anisocytosis Mod/2+ None Seen    Basophilic Stippling Slight/1+ None Seen    Hypochromia Slight/1+ None Seen    Macrocytes Mod/2+ None Seen    Ovalocytes Slight/1+ None Seen    Poikilocytes Slight/1+ None Seen    Target Cells Slight/1+ None Seen    WBC Morphology Normal Normal    Clumped Platelets Present None Seen   Iron Profile    Collection Time: 11/07/24  2:28 PM    Specimen: Arm, Left; Blood   Result Value Ref Range    Iron 59 59 - 158 mcg/dL    Iron Saturation (TSAT) 23 20 - 50 %    Transferrin 175 (L) 200 - 360 mg/dL    TIBC 261 (L) 298 - 536 mcg/dL   Ferritin    Collection Time: 11/07/24  2:28 PM    Specimen: Arm, Left; Blood   Result Value Ref Range    Ferritin 586.70 (H) 30.00 - 400.00 ng/mL   Lactate Dehydrogenase    Collection Time: 11/07/24  2:28 PM    Specimen: Arm, Left; Blood   Result Value Ref Range     (H) 135 - 225 U/L   Erythropoietin    Collection Time: 11/07/24  2:28 PM    Specimen: Blood   Result Value Ref Range    Erythropoietin 28.7 (H) 2.6 - 18.5 mIU/mL   Haptoglobin    Collection Time: 11/07/24  2:28 PM    Specimen: Blood   Result  Value Ref Range    Haptoglobin 34 30 - 200 mg/dL     1. Other iron deficiency anemia    2. Anemia, unspecified type    3. 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 Anemia.  2.  Positive DAFNE  -Patient received Ferrlecit 250 mg on July 23 and 24, 2024.  -Labs today: Hgb 10.2, HCT 30.9, iron 59, ferritin 587, sat 23, TIBC 261  -Iron profile is normal.  Anemia remains.  This is likely secondary to chronic liver disease and sequestration.  Other differentials include hemolysis, marrow suppression and nutritional deficiencies.  -Previous labs showed a positive DAFNE  -Will check erythropoietin, haptoglobin, LDH  - If the hemoglobin level drops below 10 can consider treatment for AIHA (rituximab).  -May also need to consider bone marrow biopsy   -Will add flow cytometry.       3. Splenomegaly.  His spleen is enlarged, which can contribute to the sequestration of blood cells. A CT scan of the spleen will be considered to assess its size and impact on his condition.    4. Liver Disease.  US Abdomen Limited (10/25/2024 08:47)   He has liver damage with a high bilirubin level, which has improved from 10.6 to 5.7. Continued monitoring of liver function is necessary. He has stopped drinking alcohol since October 22, 2024, which is expected to aid in liver recovery.    5. Cardiomyopathy.  He has a history of cardiomyopathy and has a Watchman device implanted for atrial fibrillation. He is currently on 81 mg aspirin daily and will continue this regimen.    6. Hypertension.  His blood pressure has been well-controlled with medication for the past two years. He should continue his current antihypertensive medication.    7. Chronic Diastolic Congestive Heart Failure.  He has chronic diastolic congestive heart failure, which is stable and showing improvement with Jardiance.   Recommendations for HFrEF (definitely EF <40%, possibly <50%) with NYHA II or III symptoms, include replacement of iron for Ferritin <100  or Ferritin 100-299 with TSAT <20%.    Continued monitoring and management of heart failure symptoms are necessary.    Follow-up  Return in 4 weeks for follow-up.  Labs same day.    Pt voices understanding and agrees to tx plan.              ACP discussion was held with the patient during this visit. Patient has an advance directive in EMR which is still valid.     Thank you for the referral.    I spent 30 minutes caring for Kristian on this date of service. This time includes time spent by me in the following activities: preparing for the visit, reviewing tests, performing a medically appropriate examination and/or evaluation, counseling and educating the patient/family/caregiver, ordering medications, tests, or procedures, documenting information in the medical record, independently interpreting results and communicating that information with the patient/family/caregiver, and care coordination.        JONG Estrella  11/07/2024     Patient or patient representative verbalized consent for the use of Ambient Listening during the visit with  JONG Estrella for chart documentation.

## 2024-11-08 LAB
EPO SERPL-ACNC: 28.7 MIU/ML (ref 2.6–18.5)
HAPTOGLOB SERPL-MCNC: 34 MG/DL (ref 30–200)

## 2024-11-11 LAB — Lab: NORMAL

## 2024-11-21 ENCOUNTER — OFFICE VISIT (OUTPATIENT)
Dept: INTERNAL MEDICINE | Facility: CLINIC | Age: 79
End: 2024-11-21
Payer: MEDICARE

## 2024-11-21 ENCOUNTER — HOSPITAL ENCOUNTER (OUTPATIENT)
Dept: ULTRASOUND IMAGING | Facility: HOSPITAL | Age: 79
Discharge: HOME | End: 2024-11-21
Admitting: INTERNAL MEDICINE
Payer: MEDICARE

## 2024-11-21 VITALS
HEART RATE: 78 BPM | WEIGHT: 228 LBS | DIASTOLIC BLOOD PRESSURE: 68 MMHG | SYSTOLIC BLOOD PRESSURE: 122 MMHG | TEMPERATURE: 98.4 F | OXYGEN SATURATION: 99 % | BODY MASS INDEX: 35.79 KG/M2 | HEIGHT: 67 IN

## 2024-11-21 DIAGNOSIS — E66.01 CLASS 2 SEVERE OBESITY DUE TO EXCESS CALORIES WITH SERIOUS COMORBIDITY AND BODY MASS INDEX (BMI) OF 35.0 TO 35.9 IN ADULT: ICD-10-CM

## 2024-11-21 DIAGNOSIS — F10.10 ALCOHOL ABUSE: ICD-10-CM

## 2024-11-21 DIAGNOSIS — K70.10 ACUTE ALCOHOLIC HEPATITIS: ICD-10-CM

## 2024-11-21 DIAGNOSIS — M79.89 LEFT UPPER EXTREMITY SWELLING: Primary | ICD-10-CM

## 2024-11-21 DIAGNOSIS — E66.812 CLASS 2 SEVERE OBESITY DUE TO EXCESS CALORIES WITH SERIOUS COMORBIDITY AND BODY MASS INDEX (BMI) OF 35.0 TO 35.9 IN ADULT: ICD-10-CM

## 2024-11-21 DIAGNOSIS — M79.89 LEFT UPPER EXTREMITY SWELLING: ICD-10-CM

## 2024-11-21 DIAGNOSIS — L03.119 CELLULITIS OF UPPER EXTREMITY, UNSPECIFIED LATERALITY: ICD-10-CM

## 2024-11-21 PROCEDURE — 93971 EXTREMITY STUDY: CPT

## 2024-11-21 RX ORDER — DOXYCYCLINE 100 MG/1
100 CAPSULE ORAL 2 TIMES DAILY
Qty: 14 CAPSULE | Refills: 0 | Status: SHIPPED | OUTPATIENT
Start: 2024-11-21

## 2024-11-21 NOTE — PROGRESS NOTES
"      Chief Complaint  Arm Pain (Left arm pain and redness swelling- ( if any new medication is sent in today he needs [facility]) /Started having issues the end of October /Put ice pack on it yesterday and did seem to help some/Continues to have swelling in legs and feet this is an ongoing issue not worse or better )    Subjective        Kristian Mir presents to [facility]    HPI    Patient here for the above problems.  See Assessment and Plan for further HPI components.      Review of Systems    Objective   Vital Signs:  /68 (BP Location: Right arm, Patient Position: Sitting, Cuff Size: Adult)   Pulse 78   Temp 98.4 °F (36.9 °C) (Temporal)   Ht 170.2 cm (67\")   Wt 103 kg (228 lb)   SpO2 99%   BMI 35.71 kg/m²   Estimated body mass index is 35.71 kg/m² as calculated from the following:    Height as of this encounter: 170.2 cm (67\").    Weight as of this encounter: 103 kg (228 lb).      Physical Exam  Vitals and nursing note reviewed.   Constitutional:       Appearance: He is obese. He is not ill-appearing.   Eyes:      General: No scleral icterus.     Conjunctiva/sclera: Conjunctivae normal.   Pulmonary:      Effort: Pulmonary effort is normal. No respiratory distress.   Musculoskeletal:      Left upper arm: Swelling and edema present.      Right lower leg: Edema present.      Left lower leg: Edema present.      Comments: Erythema    Neurological:      General: No focal deficit present.      Mental Status: He is alert and oriented to person, place, and time.   Psychiatric:         Mood and Affect: Mood normal.         Behavior: Behavior normal.                       Assessment and Plan   Diagnoses and all orders for this visit:    1. Left upper extremity swelling (Primary)  -     US Venous Doppler Upper Extremity Left (duplex); Future    2. Cellulitis of upper extremity, unspecified laterality    3. Acute alcoholic hepatitis    4. Class 2 severe " obesity due to excess calories with serious comorbidity and body mass index (BMI) of 35.0 to 35.9 in adult    5. Alcohol abuse    Other orders  -     doxycycline (VIBRAMYCIN) 100 MG capsule; Take 1 capsule by mouth 2 (Two) Times a Day.  Dispense: 14 capsule; Refill: 0        History of Present Illness  The patient is a 74-year-old gentleman who presents today for arm pain. He is accompanied by an adult female.    He reports experiencing pain in his left arm, which is also red and swollen. He mentions that he had an IV in this arm during his hospital stay. Additionally, he notes the presence of fluid under the skin of his arm. He applied ice to the area yesterday, which resulted in a reduction of the swelling.    He has not had a bowel movement in the past 2 to 3 days.    He has gained weight since he stopped drinking alcohol. He has attempted to use compression socks but was unable to zip them up completely.    SOCIAL HISTORY  The patient quit drinking alcohol.      Assessment & Plan  1. Left arm pain.   He presents with left arm pain, redness, and swelling. There is a concern for possible infection and a clot. An ultrasound of the left arm has been ordered to rule out a clot. Antibiotics have been prescribed and sent to [facility] in [place]. He is advised to elevate his arm using pillows.      Venous insufficiency    Patient has venous insufficiency.  This is gravity dependent.  Worse at the end of the day.  Better in AM.  Recommend low salt diet.  Recommend elevation of lower extremities.  Recommend compression stockings as tolerated.  If persists, then we may need to consider secondary causes or other treatment.      2. Weight gain.  He reports weight gain after quitting drinking. It is discussed that this could be due to increased food intake replacing alcohol consumption. He is advised to monitor his diet and reduce salt intake to help manage weight and swelling.    3. Constipation.  He reports not having a good bowel  movement in 2 to 3 days. Increasing fiber intake and hydration is recommended to help alleviate constipation.    4. Alcoholic hepatitis.  He has a history of alcoholic hepatitis, which can be life-threatening. He is advised to continue abstaining from alcohol to prevent further liver damage.    5. Blood work.  He is scheduled for blood work on December 7 at the cancer center at [facility]. A metabolic panel will be added to assess liver and kidney function.    First time I have seen him since his hospitalization.  Reviewed labs from his hospitalization.        Result Review :  The following data was reviewed by: Norberto Candelario MD on 11/21/2024:  CMP          10/28/2024    01:55 11/4/2024    14:31 11/7/2024    14:28   CMP   Glucose 138  114  91    BUN 14  8  12    Creatinine 1.37  0.79  1.13    EGFR 52.8  90.9  66.5    Sodium 134  139  142    Potassium 4.1  3.9  4.0    Chloride 95  103  107    Calcium 6.9  8.0  8.7    Total Protein 4.6  5.5  5.7    Albumin 2.8  3.2  3.5    Globulin 1.8  2.3  2.2    Total Bilirubin 6.5  5.7  3.8    Alkaline Phosphatase 213  177  144    AST (SGOT) 116  121  110    ALT (SGPT) <5  33  38    Albumin/Globulin Ratio 1.6  1.4  1.6    BUN/Creatinine Ratio 10.2  10.1  10.6    Anion Gap 8.0  10.0  10.0      CBC w/diff          10/23/2024    19:17 11/4/2024    14:31 11/7/2024    14:28   CBC w/Diff   WBC 7.56  6.91  7.66    RBC 3.24  2.78  2.68    Hemoglobin 12.0  10.7  10.2    Hematocrit 34.3  32.6  30.9    .9  117.3  115.3    MCH 37.0  38.5  38.1    MCHC 35.0  32.8  33.0    RDW 20.1  19.3  18.2    Platelets 179  142  174    Neutrophil Rel % 68.1      Immature Granulocyte Rel % 0.7      Lymphocyte Rel % 21.8      Monocyte Rel % 8.7      Eosinophil Rel % 0.3      Basophil Rel % 0.4        Lipid Panel          3/22/2024    08:10   Lipid Panel   Total Cholesterol 112    Triglycerides 72    HDL Cholesterol 51    VLDL Cholesterol 15    LDL Cholesterol  46      TSH          3/22/2024    08:10  10/23/2024    19:17   TSH   TSH 3.250  5.740          UA          3/22/2024    08:10   Urinalysis   Blood, UA Negative    Leukocytes, UA Negative    Nitrite, UA Negative    RBC, UA Comment    Bacteria, UA Comment                               Follow Up   No follow-ups on file.  Patient was given instructions and counseling regarding his condition or for health maintenance advice. Please see specific information pulled into the AVS if appropriate.       DELICIA Candelario MD, FACP, FHM      Electronically signed by Norberto Candelario MD, 11/21/24, 12:49 PM CST.    Patient or patient representative verbalized consent for the use of Ambient Listening during the visit with  Norberto Candelario MD for chart documentation. 11/21/2024  12:51 CST

## 2024-12-09 ENCOUNTER — LAB (OUTPATIENT)
Dept: LAB | Facility: HOSPITAL | Age: 79
End: 2024-12-09
Payer: MEDICARE

## 2024-12-09 ENCOUNTER — OFFICE VISIT (OUTPATIENT)
Dept: ONCOLOGY | Facility: CLINIC | Age: 79
End: 2024-12-09
Payer: MEDICARE

## 2024-12-09 ENCOUNTER — INFUSION (OUTPATIENT)
Dept: ONCOLOGY | Facility: HOSPITAL | Age: 79
End: 2024-12-09
Payer: MEDICARE

## 2024-12-09 ENCOUNTER — TELEPHONE (OUTPATIENT)
Dept: ONCOLOGY | Facility: CLINIC | Age: 79
End: 2024-12-09

## 2024-12-09 VITALS
SYSTOLIC BLOOD PRESSURE: 140 MMHG | HEART RATE: 87 BPM | TEMPERATURE: 97.1 F | DIASTOLIC BLOOD PRESSURE: 66 MMHG | RESPIRATION RATE: 16 BRPM | OXYGEN SATURATION: 100 %

## 2024-12-09 VITALS
HEIGHT: 67 IN | SYSTOLIC BLOOD PRESSURE: 126 MMHG | OXYGEN SATURATION: 98 % | WEIGHT: 230.5 LBS | DIASTOLIC BLOOD PRESSURE: 84 MMHG | TEMPERATURE: 98 F | HEART RATE: 91 BPM | BODY MASS INDEX: 36.18 KG/M2 | RESPIRATION RATE: 16 BRPM

## 2024-12-09 DIAGNOSIS — D64.9 ANEMIA, UNSPECIFIED TYPE: ICD-10-CM

## 2024-12-09 DIAGNOSIS — D50.8 OTHER IRON DEFICIENCY ANEMIA: ICD-10-CM

## 2024-12-09 DIAGNOSIS — E61.1 IRON DEFICIENCY: ICD-10-CM

## 2024-12-09 DIAGNOSIS — R79.9 ABNORMAL BLOOD FINDINGS: ICD-10-CM

## 2024-12-09 DIAGNOSIS — E87.6 HYPOKALEMIA: Primary | ICD-10-CM

## 2024-12-09 DIAGNOSIS — R16.1 SPLENOMEGALY: Primary | ICD-10-CM

## 2024-12-09 DIAGNOSIS — E87.6 HYPOKALEMIA: ICD-10-CM

## 2024-12-09 DIAGNOSIS — R76.8 POSITIVE DIRECT ANTIGLOBULIN TEST (DAT): ICD-10-CM

## 2024-12-09 PROBLEM — K90.9 IRON MALABSORPTION: Status: ACTIVE | Noted: 2024-12-09

## 2024-12-09 LAB
ALBUMIN SERPL-MCNC: 3.5 G/DL (ref 3.5–5.2)
ALBUMIN/GLOB SERPL: 1.9 G/DL
ALP SERPL-CCNC: 79 U/L (ref 39–117)
ALT SERPL W P-5'-P-CCNC: 39 U/L (ref 1–41)
ANION GAP SERPL CALCULATED.3IONS-SCNC: 10 MMOL/L (ref 5–15)
AST SERPL-CCNC: 26 U/L (ref 1–40)
BASOPHILS # BLD AUTO: 0.01 10*3/MM3 (ref 0–0.2)
BASOPHILS NFR BLD AUTO: 0.2 % (ref 0–1.5)
BILIRUB SERPL-MCNC: 1 MG/DL (ref 0–1.2)
BUN SERPL-MCNC: 21 MG/DL (ref 8–23)
BUN/CREAT SERPL: 25 (ref 7–25)
CALCIUM SPEC-SCNC: 8.7 MG/DL (ref 8.6–10.5)
CHLORIDE SERPL-SCNC: 105 MMOL/L (ref 98–107)
CO2 SERPL-SCNC: 30 MMOL/L (ref 22–29)
CREAT SERPL-MCNC: 0.84 MG/DL (ref 0.76–1.27)
DEPRECATED RDW RBC AUTO: 61.7 FL (ref 37–54)
EGFRCR SERPLBLD CKD-EPI 2021: 89.3 ML/MIN/1.73
EOSINOPHIL # BLD AUTO: 0.08 10*3/MM3 (ref 0–0.4)
EOSINOPHIL NFR BLD AUTO: 1.4 % (ref 0.3–6.2)
ERYTHROCYTE [DISTWIDTH] IN BLOOD BY AUTOMATED COUNT: 15.4 % (ref 12.3–15.4)
FERRITIN SERPL-MCNC: 151.9 NG/ML (ref 30–400)
GLOBULIN UR ELPH-MCNC: 1.8 GM/DL
GLUCOSE SERPL-MCNC: 104 MG/DL (ref 65–99)
HAPTOGLOB SERPL-MCNC: 90 MG/DL (ref 30–200)
HCT VFR BLD AUTO: 35.2 % (ref 37.5–51)
HGB BLD-MCNC: 11.5 G/DL (ref 13–17.7)
IMM GRANULOCYTES # BLD AUTO: 0.04 10*3/MM3 (ref 0–0.05)
IMM GRANULOCYTES NFR BLD AUTO: 0.7 % (ref 0–0.5)
IRON 24H UR-MRATE: 57 MCG/DL (ref 59–158)
IRON SATN MFR SERPL: 19 % (ref 20–50)
LDH SERPL-CCNC: 203 U/L (ref 135–225)
LYMPHOCYTES # BLD AUTO: 1.57 10*3/MM3 (ref 0.7–3.1)
LYMPHOCYTES NFR BLD AUTO: 27.5 % (ref 19.6–45.3)
MCH RBC QN AUTO: 36.2 PG (ref 26.6–33)
MCHC RBC AUTO-ENTMCNC: 32.7 G/DL (ref 31.5–35.7)
MCV RBC AUTO: 110.7 FL (ref 79–97)
MONOCYTES # BLD AUTO: 0.35 10*3/MM3 (ref 0.1–0.9)
MONOCYTES NFR BLD AUTO: 6.1 % (ref 5–12)
NEUTROPHILS NFR BLD AUTO: 3.65 10*3/MM3 (ref 1.7–7)
NEUTROPHILS NFR BLD AUTO: 64.1 % (ref 42.7–76)
PLATELET # BLD AUTO: 176 10*3/MM3 (ref 140–450)
PMV BLD AUTO: 9 FL (ref 6–12)
POTASSIUM SERPL-SCNC: 2.6 MMOL/L (ref 3.5–5.2)
PROT SERPL-MCNC: 5.3 G/DL (ref 6–8.5)
RBC # BLD AUTO: 3.18 10*6/MM3 (ref 4.14–5.8)
RETICS # AUTO: 0.11 10*6/MM3 (ref 0.02–0.13)
RETICS/RBC NFR AUTO: 3.56 % (ref 0.7–1.9)
SODIUM SERPL-SCNC: 145 MMOL/L (ref 136–145)
TIBC SERPL-MCNC: 294 MCG/DL (ref 298–536)
TRANSFERRIN SERPL-MCNC: 197 MG/DL (ref 200–360)
WBC NRBC COR # BLD AUTO: 5.7 10*3/MM3 (ref 3.4–10.8)

## 2024-12-09 PROCEDURE — 83615 LACTATE (LD) (LDH) ENZYME: CPT

## 2024-12-09 PROCEDURE — 84630 ASSAY OF ZINC: CPT

## 2024-12-09 PROCEDURE — 3079F DIAST BP 80-89 MM HG: CPT | Performed by: NURSE PRACTITIONER

## 2024-12-09 PROCEDURE — 96366 THER/PROPH/DIAG IV INF ADDON: CPT

## 2024-12-09 PROCEDURE — 1126F AMNT PAIN NOTED NONE PRSNT: CPT | Performed by: NURSE PRACTITIONER

## 2024-12-09 PROCEDURE — 82728 ASSAY OF FERRITIN: CPT

## 2024-12-09 PROCEDURE — 36415 COLL VENOUS BLD VENIPUNCTURE: CPT

## 2024-12-09 PROCEDURE — 82525 ASSAY OF COPPER: CPT

## 2024-12-09 PROCEDURE — 25010000002 POTASSIUM CHLORIDE 10 MEQ/100ML SOLUTION: Performed by: NURSE PRACTITIONER

## 2024-12-09 PROCEDURE — 80053 COMPREHEN METABOLIC PANEL: CPT

## 2024-12-09 PROCEDURE — 99214 OFFICE O/P EST MOD 30 MIN: CPT | Performed by: NURSE PRACTITIONER

## 2024-12-09 PROCEDURE — 3074F SYST BP LT 130 MM HG: CPT | Performed by: NURSE PRACTITIONER

## 2024-12-09 PROCEDURE — 85025 COMPLETE CBC W/AUTO DIFF WBC: CPT

## 2024-12-09 PROCEDURE — 83010 ASSAY OF HAPTOGLOBIN QUANT: CPT

## 2024-12-09 PROCEDURE — 84466 ASSAY OF TRANSFERRIN: CPT

## 2024-12-09 PROCEDURE — 96365 THER/PROPH/DIAG IV INF INIT: CPT

## 2024-12-09 PROCEDURE — 83540 ASSAY OF IRON: CPT

## 2024-12-09 PROCEDURE — 85045 AUTOMATED RETICULOCYTE COUNT: CPT

## 2024-12-09 PROCEDURE — 25810000003 SODIUM CHLORIDE 0.9 % SOLUTION: Performed by: NURSE PRACTITIONER

## 2024-12-09 RX ORDER — HYDROCORTISONE SODIUM SUCCINATE 100 MG/2ML
100 INJECTION INTRAMUSCULAR; INTRAVENOUS AS NEEDED
OUTPATIENT
Start: 2024-12-09

## 2024-12-09 RX ORDER — POTASSIUM CHLORIDE 7.45 MG/ML
10 INJECTION INTRAVENOUS
Status: COMPLETED | OUTPATIENT
Start: 2024-12-09 | End: 2024-12-09

## 2024-12-09 RX ORDER — SODIUM CHLORIDE 9 MG/ML
20 INJECTION, SOLUTION INTRAVENOUS ONCE
Status: CANCELLED | OUTPATIENT
Start: 2024-12-09

## 2024-12-09 RX ORDER — POTASSIUM CHLORIDE 14.9 MG/ML
20 INJECTION INTRAVENOUS ONCE
Status: DISCONTINUED | OUTPATIENT
Start: 2024-12-09 | End: 2024-12-09

## 2024-12-09 RX ORDER — POTASSIUM CHLORIDE 14.9 MG/ML
20 INJECTION INTRAVENOUS ONCE
Status: CANCELLED | OUTPATIENT
Start: 2024-12-09

## 2024-12-09 RX ORDER — FAMOTIDINE 10 MG/ML
20 INJECTION, SOLUTION INTRAVENOUS AS NEEDED
OUTPATIENT
Start: 2024-12-09

## 2024-12-09 RX ORDER — SODIUM CHLORIDE 9 MG/ML
20 INJECTION, SOLUTION INTRAVENOUS ONCE
Status: COMPLETED | OUTPATIENT
Start: 2024-12-09 | End: 2024-12-09

## 2024-12-09 RX ORDER — SODIUM CHLORIDE 9 MG/ML
20 INJECTION, SOLUTION INTRAVENOUS ONCE
OUTPATIENT
Start: 2024-12-09

## 2024-12-09 RX ORDER — DIPHENHYDRAMINE HYDROCHLORIDE 50 MG/ML
50 INJECTION INTRAMUSCULAR; INTRAVENOUS AS NEEDED
OUTPATIENT
Start: 2024-12-09

## 2024-12-09 RX ADMIN — POTASSIUM CHLORIDE 10 MEQ: 10 INJECTION, SOLUTION INTRAVENOUS at 12:21

## 2024-12-09 RX ADMIN — SODIUM CHLORIDE 20 ML/HR: 9 INJECTION, SOLUTION INTRAVENOUS at 11:16

## 2024-12-09 RX ADMIN — POTASSIUM CHLORIDE 10 MEQ: 10 INJECTION, SOLUTION INTRAVENOUS at 11:17

## 2024-12-09 NOTE — TELEPHONE ENCOUNTER
CRITICAL LAB VALUE  Received call from [facility] with CRITICAL LAB VALUE OF:    POTASSIUM: 2.6    This information was sent to Adia Harris NP for review

## 2024-12-09 NOTE — PATIENT INSTRUCTIONS
Take your stool cards to [facility] when done.      We will order iron infusions at [facility]     We will schedule bone marrow biopsy and spleen ultrasound HERE IN [place].      Dr. Candelario will call you about your potassium.     You will come to see me again in 4 weeks to review bone marrow results.  You can get the labs here the same day.

## 2024-12-10 ENCOUNTER — TELEPHONE (OUTPATIENT)
Dept: INTERNAL MEDICINE | Facility: CLINIC | Age: 79
End: 2024-12-10
Payer: MEDICARE

## 2024-12-10 DIAGNOSIS — E87.6 HYPOKALEMIA: Primary | ICD-10-CM

## 2024-12-10 NOTE — PROGRESS NOTES
Contacted pt yesterday to instruct to take K+ 20meq bid and recheck K+ on Thursday.  He plans to go to [facility] in [place] to have drawn.

## 2024-12-10 NOTE — TELEPHONE ENCOUNTER
Called pt yesterday afternoon to instruct to increase oral K+ to 20meq bid and recheck K+ on Thursday.  He plans to have lab checked at [facility] in [place].

## 2024-12-11 LAB
COPPER SERPL-MCNC: 83 UG/DL (ref 69–132)
ZINC SERPL-MCNC: 57 UG/DL (ref 44–115)

## 2024-12-12 ENCOUNTER — LAB (OUTPATIENT)
Dept: INTERNAL MEDICINE | Facility: CLINIC | Age: 79
End: 2024-12-12
Payer: MEDICARE

## 2024-12-12 DIAGNOSIS — E87.6 HYPOKALEMIA: Primary | ICD-10-CM

## 2024-12-13 ENCOUNTER — TELEPHONE (OUTPATIENT)
Dept: CARDIOLOGY | Facility: CLINIC | Age: 79
End: 2024-12-13

## 2024-12-13 ENCOUNTER — TELEPHONE (OUTPATIENT)
Dept: ONCOLOGY | Facility: CLINIC | Age: 79
End: 2024-12-13
Payer: MEDICARE

## 2024-12-13 DIAGNOSIS — E87.6 HYPOKALEMIA: Primary | ICD-10-CM

## 2024-12-13 LAB — POTASSIUM SERPL-SCNC: 3.2 MMOL/L (ref 3.5–5.2)

## 2024-12-13 RX ORDER — POTASSIUM CHLORIDE 1500 MG/1
20 TABLET, EXTENDED RELEASE ORAL DAILY
Qty: 10 TABLET | Refills: 0 | Status: SHIPPED | OUTPATIENT
Start: 2024-12-13

## 2024-12-13 NOTE — TELEPHONE ENCOUNTER
Caller: WILY    Relationship: Other    Best call back number: [phone]    What was the call regarding: JONG IRWIN IS NEEDING TO KNOW HOW PT SHOULD HOLD ASPIRIN BEFORE HIS BONE MARROW BIOPSY ON 12.20.24. PLEASE CALL BACK TO RELAY OR FAX A LETTER -[phone]

## 2024-12-14 NOTE — PROGRESS NOTES
It looks like he takes potassium 10 mEq daily currently, I have increased this to 20 mEq daily with instructions to repeat labs on Monday -I personally am okay with him going to the [facility] location in [place] to get this drawn, lab orders have been placed including BMP and magnesium.

## 2024-12-16 NOTE — TELEPHONE ENCOUNTER
Daniel Clayton MD  You3 days ago       I would not advise holding it at all.     A letter is faxed to JONG Castro.Hue Jordan MA

## 2024-12-17 ENCOUNTER — LAB (OUTPATIENT)
Dept: INTERNAL MEDICINE | Facility: CLINIC | Age: 79
End: 2024-12-17
Payer: MEDICARE

## 2024-12-17 DIAGNOSIS — E87.6 HYPOKALEMIA: Primary | ICD-10-CM

## 2024-12-17 DIAGNOSIS — D50.9 IRON DEFICIENCY ANEMIA, UNSPECIFIED IRON DEFICIENCY ANEMIA TYPE: ICD-10-CM

## 2024-12-17 DIAGNOSIS — Z79.899 ENCOUNTER FOR LONG-TERM CURRENT USE OF MEDICATION: ICD-10-CM

## 2024-12-17 DIAGNOSIS — Z11.59 NEED FOR HEPATITIS C SCREENING TEST: ICD-10-CM

## 2024-12-17 DIAGNOSIS — I10 PRIMARY HYPERTENSION: ICD-10-CM

## 2024-12-18 LAB
ALBUMIN SERPL-MCNC: 3.7 G/DL (ref 3.8–4.8)
ALP SERPL-CCNC: 99 IU/L (ref 44–121)
ALT SERPL-CCNC: 76 IU/L (ref 0–44)
AST SERPL-CCNC: 52 IU/L (ref 0–40)
BASOPHILS # BLD AUTO: 0 X10E3/UL (ref 0–0.2)
BASOPHILS NFR BLD AUTO: 0 %
BILIRUB SERPL-MCNC: 1 MG/DL (ref 0–1.2)
BUN SERPL-MCNC: 16 MG/DL (ref 8–27)
BUN SERPL-MCNC: 16 MG/DL (ref 8–27)
BUN/CREAT SERPL: 16 (ref 10–24)
BUN/CREAT SERPL: 17 (ref 10–24)
CALCIUM SERPL-MCNC: 9.7 MG/DL (ref 8.6–10.2)
CALCIUM SERPL-MCNC: 9.8 MG/DL (ref 8.6–10.2)
CHLORIDE SERPL-SCNC: 100 MMOL/L (ref 96–106)
CHLORIDE SERPL-SCNC: 101 MMOL/L (ref 96–106)
CHOLEST SERPL-MCNC: 110 MG/DL (ref 100–199)
CO2 SERPL-SCNC: 27 MMOL/L (ref 20–29)
CO2 SERPL-SCNC: 27 MMOL/L (ref 20–29)
CREAT SERPL-MCNC: 0.95 MG/DL (ref 0.76–1.27)
CREAT SERPL-MCNC: 0.97 MG/DL (ref 0.76–1.27)
EGFRCR SERPLBLD CKD-EPI 2021: 80 ML/MIN/1.73
EGFRCR SERPLBLD CKD-EPI 2021: 82 ML/MIN/1.73
EOSINOPHIL # BLD AUTO: 0.1 X10E3/UL (ref 0–0.4)
EOSINOPHIL NFR BLD AUTO: 1 %
ERYTHROCYTE [DISTWIDTH] IN BLOOD BY AUTOMATED COUNT: 14 % (ref 11.6–15.4)
GLOBULIN SER CALC-MCNC: 1.6 G/DL (ref 1.5–4.5)
GLUCOSE SERPL-MCNC: 92 MG/DL (ref 70–99)
GLUCOSE SERPL-MCNC: 92 MG/DL (ref 70–99)
GLUCOSE UR QL STRIP: NORMAL
HCT VFR BLD AUTO: 36.8 % (ref 37.5–51)
HCV IGG SERPL QL IA: NON REACTIVE
HDLC SERPL-MCNC: 53 MG/DL
HGB BLD-MCNC: 12 G/DL (ref 13–17.7)
IMM GRANULOCYTES # BLD AUTO: 0.1 X10E3/UL (ref 0–0.1)
IMM GRANULOCYTES NFR BLD AUTO: 1 %
KETONES UR QL STRIP: NORMAL
LDLC SERPL CALC-MCNC: 42 MG/DL (ref 0–99)
LYMPHOCYTES # BLD AUTO: 1.6 X10E3/UL (ref 0.7–3.1)
LYMPHOCYTES NFR BLD AUTO: 24 %
MAGNESIUM SERPL-MCNC: 1.6 MG/DL (ref 1.6–2.3)
MCH RBC QN AUTO: 35.9 PG (ref 26.6–33)
MCHC RBC AUTO-ENTMCNC: 32.6 G/DL (ref 31.5–35.7)
MCV RBC AUTO: 110 FL (ref 79–97)
MONOCYTES # BLD AUTO: 0.4 X10E3/UL (ref 0.1–0.9)
MONOCYTES NFR BLD AUTO: 6 %
NEUTROPHILS # BLD AUTO: 4.7 X10E3/UL (ref 1.4–7)
NEUTROPHILS NFR BLD AUTO: 68 %
PH UR STRIP: NORMAL [PH]
PLATELET # BLD AUTO: 232 X10E3/UL (ref 150–450)
POTASSIUM SERPL-SCNC: 3.9 MMOL/L (ref 3.5–5.2)
POTASSIUM SERPL-SCNC: 4 MMOL/L (ref 3.5–5.2)
PROT SERPL-MCNC: 5.3 G/DL (ref 6–8.5)
PROT UR QL STRIP: NORMAL
RBC # BLD AUTO: 3.34 X10E6/UL (ref 4.14–5.8)
SODIUM SERPL-SCNC: 141 MMOL/L (ref 134–144)
SODIUM SERPL-SCNC: 143 MMOL/L (ref 134–144)
SP GR UR STRIP: NORMAL
SPECIMEN STATUS: NORMAL
T4 FREE SERPL-MCNC: 1.25 NG/DL (ref 0.82–1.77)
TRIGL SERPL-MCNC: 74 MG/DL (ref 0–149)
TSH SERPL DL<=0.005 MIU/L-ACNC: 4.7 UIU/ML (ref 0.45–4.5)
VLDLC SERPL CALC-MCNC: 15 MG/DL (ref 5–40)
WBC # BLD AUTO: 6.9 X10E3/UL (ref 3.4–10.8)

## 2024-12-19 ENCOUNTER — TELEPHONE (OUTPATIENT)
Dept: INTERVENTIONAL RADIOLOGY/VASCULAR | Facility: HOSPITAL | Age: 79
End: 2024-12-19

## 2024-12-19 DIAGNOSIS — E83.42 HYPOMAGNESEMIA: Primary | ICD-10-CM

## 2024-12-19 RX ORDER — MAGNESIUM OXIDE 400 MG/1
200 TABLET ORAL DAILY
Qty: 45 TABLET | Refills: 1 | Status: SHIPPED | OUTPATIENT
Start: 2024-12-19

## 2024-12-20 ENCOUNTER — INFUSION (OUTPATIENT)
Dept: ONCOLOGY | Facility: HOSPITAL | Age: 79
End: 2024-12-20
Payer: MEDICARE

## 2024-12-20 ENCOUNTER — HOSPITAL ENCOUNTER (OUTPATIENT)
Dept: ULTRASOUND IMAGING | Facility: HOSPITAL | Age: 79
Discharge: HOME | End: 2024-12-20
Payer: MEDICARE

## 2024-12-20 ENCOUNTER — HOSPITAL ENCOUNTER (OUTPATIENT)
Dept: CT IMAGING | Facility: HOSPITAL | Age: 79
Discharge: HOME | End: 2024-12-20
Payer: MEDICARE

## 2024-12-20 VITALS
SYSTOLIC BLOOD PRESSURE: 131 MMHG | WEIGHT: 197.5 LBS | DIASTOLIC BLOOD PRESSURE: 104 MMHG | RESPIRATION RATE: 14 BRPM | OXYGEN SATURATION: 97 % | HEART RATE: 93 BPM | TEMPERATURE: 98 F | HEIGHT: 67 IN | BODY MASS INDEX: 31 KG/M2

## 2024-12-20 VITALS
DIASTOLIC BLOOD PRESSURE: 50 MMHG | WEIGHT: 214.4 LBS | HEART RATE: 108 BPM | TEMPERATURE: 98.3 F | SYSTOLIC BLOOD PRESSURE: 123 MMHG | RESPIRATION RATE: 18 BRPM | OXYGEN SATURATION: 100 % | BODY MASS INDEX: 33.58 KG/M2

## 2024-12-20 DIAGNOSIS — K90.9 IRON MALABSORPTION: Primary | ICD-10-CM

## 2024-12-20 DIAGNOSIS — E61.1 IRON DEFICIENCY: ICD-10-CM

## 2024-12-20 DIAGNOSIS — D64.9 ANEMIA, UNSPECIFIED TYPE: ICD-10-CM

## 2024-12-20 DIAGNOSIS — R16.1 SPLENOMEGALY: ICD-10-CM

## 2024-12-20 LAB
BASOPHILS # BLD AUTO: 0.03 10*3/MM3 (ref 0–0.2)
BASOPHILS NFR BLD AUTO: 0.6 % (ref 0–1.5)
DEPRECATED RDW RBC AUTO: 61.6 FL (ref 37–54)
EOSINOPHIL # BLD AUTO: 0.12 10*3/MM3 (ref 0–0.4)
EOSINOPHIL NFR BLD AUTO: 2.2 % (ref 0.3–6.2)
ERYTHROCYTE [DISTWIDTH] IN BLOOD BY AUTOMATED COUNT: 15.3 % (ref 12.3–15.4)
HCT VFR BLD AUTO: 38.3 % (ref 37.5–51)
HGB BLD-MCNC: 12.1 G/DL (ref 13–17.7)
IMM GRANULOCYTES # BLD AUTO: 0.03 10*3/MM3 (ref 0–0.05)
IMM GRANULOCYTES NFR BLD AUTO: 0.6 % (ref 0–0.5)
INR PPP: 1.07 (ref 0.91–1.09)
LYMPHOCYTES # BLD AUTO: 1.75 10*3/MM3 (ref 0.7–3.1)
LYMPHOCYTES NFR BLD AUTO: 32.3 % (ref 19.6–45.3)
MCH RBC QN AUTO: 35 PG (ref 26.6–33)
MCHC RBC AUTO-ENTMCNC: 31.6 G/DL (ref 31.5–35.7)
MCV RBC AUTO: 110.7 FL (ref 79–97)
MONOCYTES # BLD AUTO: 0.38 10*3/MM3 (ref 0.1–0.9)
MONOCYTES NFR BLD AUTO: 7 % (ref 5–12)
NEUTROPHILS NFR BLD AUTO: 3.1 10*3/MM3 (ref 1.7–7)
NEUTROPHILS NFR BLD AUTO: 57.3 % (ref 42.7–76)
PLATELET # BLD AUTO: 185 10*3/MM3 (ref 140–450)
PMV BLD AUTO: 9.6 FL (ref 6–12)
PROTHROMBIN TIME: 14.3 SECONDS (ref 11.8–14.8)
RBC # BLD AUTO: 3.46 10*6/MM3 (ref 4.14–5.8)
WBC NRBC COR # BLD AUTO: 5.41 10*3/MM3 (ref 3.4–10.8)

## 2024-12-20 PROCEDURE — 25010000002 MIDAZOLAM PER 1MG: Performed by: RADIOLOGY

## 2024-12-20 PROCEDURE — 85610 PROTHROMBIN TIME: CPT | Performed by: RADIOLOGY

## 2024-12-20 PROCEDURE — 25010000002 NA FERRIC GLUC CPLX PER 12.5 MG: Performed by: NURSE PRACTITIONER

## 2024-12-20 PROCEDURE — 85025 COMPLETE CBC W/AUTO DIFF WBC: CPT | Performed by: RADIOLOGY

## 2024-12-20 PROCEDURE — 25010000002 LIDOCAINE 1 % SOLUTION: Performed by: RADIOLOGY

## 2024-12-20 PROCEDURE — 76705 ECHO EXAM OF ABDOMEN: CPT

## 2024-12-20 PROCEDURE — 25810000003 SODIUM CHLORIDE 0.9 % SOLUTION: Performed by: NURSE PRACTITIONER

## 2024-12-20 PROCEDURE — 77012 CT SCAN FOR NEEDLE BIOPSY: CPT

## 2024-12-20 PROCEDURE — 96365 THER/PROPH/DIAG IV INF INIT: CPT

## 2024-12-20 PROCEDURE — 36415 COLL VENOUS BLD VENIPUNCTURE: CPT

## 2024-12-20 PROCEDURE — 25010000002 FENTANYL CITRATE (PF) 50 MCG/ML SOLUTION: Performed by: RADIOLOGY

## 2024-12-20 PROCEDURE — 96366 THER/PROPH/DIAG IV INF ADDON: CPT

## 2024-12-20 RX ORDER — SODIUM CHLORIDE 0.9 % (FLUSH) 0.9 %
10 SYRINGE (ML) INJECTION AS NEEDED
Status: DISCONTINUED | OUTPATIENT
Start: 2024-12-20 | End: 2024-12-21 | Stop reason: HOSPADM

## 2024-12-20 RX ORDER — DIPHENHYDRAMINE HYDROCHLORIDE 50 MG/ML
50 INJECTION INTRAMUSCULAR; INTRAVENOUS AS NEEDED
OUTPATIENT
Start: 2024-12-27

## 2024-12-20 RX ORDER — DIPHENHYDRAMINE HYDROCHLORIDE 50 MG/ML
50 INJECTION INTRAMUSCULAR; INTRAVENOUS AS NEEDED
Status: DISCONTINUED | OUTPATIENT
Start: 2024-12-20 | End: 2024-12-20 | Stop reason: HOSPADM

## 2024-12-20 RX ORDER — FENTANYL CITRATE 50 UG/ML
INJECTION, SOLUTION INTRAMUSCULAR; INTRAVENOUS AS NEEDED
Status: COMPLETED | OUTPATIENT
Start: 2024-12-20 | End: 2024-12-20

## 2024-12-20 RX ORDER — SODIUM CHLORIDE 9 MG/ML
20 INJECTION, SOLUTION INTRAVENOUS ONCE
Status: COMPLETED | OUTPATIENT
Start: 2024-12-20 | End: 2024-12-20

## 2024-12-20 RX ORDER — FAMOTIDINE 10 MG/ML
20 INJECTION, SOLUTION INTRAVENOUS AS NEEDED
OUTPATIENT
Start: 2024-12-27

## 2024-12-20 RX ORDER — HYDROCORTISONE SODIUM SUCCINATE 100 MG/2ML
100 INJECTION INTRAMUSCULAR; INTRAVENOUS AS NEEDED
Status: DISCONTINUED | OUTPATIENT
Start: 2024-12-20 | End: 2024-12-20 | Stop reason: HOSPADM

## 2024-12-20 RX ORDER — LIDOCAINE HYDROCHLORIDE 10 MG/ML
INJECTION, SOLUTION INFILTRATION; PERINEURAL AS NEEDED
Status: COMPLETED | OUTPATIENT
Start: 2024-12-20 | End: 2024-12-20

## 2024-12-20 RX ORDER — SODIUM CHLORIDE 9 MG/ML
20 INJECTION, SOLUTION INTRAVENOUS ONCE
OUTPATIENT
Start: 2024-12-27

## 2024-12-20 RX ORDER — HYDROCORTISONE SODIUM SUCCINATE 100 MG/2ML
100 INJECTION INTRAMUSCULAR; INTRAVENOUS AS NEEDED
OUTPATIENT
Start: 2024-12-27

## 2024-12-20 RX ORDER — MIDAZOLAM HYDROCHLORIDE 2 MG/2ML
INJECTION, SOLUTION INTRAMUSCULAR; INTRAVENOUS AS NEEDED
Status: COMPLETED | OUTPATIENT
Start: 2024-12-20 | End: 2024-12-20

## 2024-12-20 RX ORDER — SODIUM CHLORIDE 0.9 % (FLUSH) 0.9 %
3 SYRINGE (ML) INJECTION EVERY 12 HOURS SCHEDULED
Status: DISCONTINUED | OUTPATIENT
Start: 2024-12-20 | End: 2024-12-21 | Stop reason: HOSPADM

## 2024-12-20 RX ORDER — FAMOTIDINE 10 MG/ML
20 INJECTION, SOLUTION INTRAVENOUS AS NEEDED
Status: DISCONTINUED | OUTPATIENT
Start: 2024-12-20 | End: 2024-12-20 | Stop reason: HOSPADM

## 2024-12-20 RX ORDER — SODIUM CHLORIDE 9 MG/ML
40 INJECTION, SOLUTION INTRAVENOUS AS NEEDED
Status: DISCONTINUED | OUTPATIENT
Start: 2024-12-20 | End: 2024-12-21 | Stop reason: HOSPADM

## 2024-12-20 RX ADMIN — SODIUM CHLORIDE 250 MG: 9 INJECTION, SOLUTION INTRAVENOUS at 12:31

## 2024-12-20 RX ADMIN — MIDAZOLAM HYDROCHLORIDE 1 MG: 1 INJECTION, SOLUTION INTRAMUSCULAR; INTRAVENOUS at 10:07

## 2024-12-20 RX ADMIN — FENTANYL CITRATE 25 MCG: 50 INJECTION, SOLUTION INTRAMUSCULAR; INTRAVENOUS at 10:07

## 2024-12-20 RX ADMIN — SODIUM CHLORIDE 20 ML/HR: 9 INJECTION, SOLUTION INTRAVENOUS at 12:20

## 2024-12-20 RX ADMIN — LIDOCAINE HYDROCHLORIDE 10 ML: 10 INJECTION, SOLUTION INFILTRATION; PERINEURAL at 10:07

## 2024-12-20 NOTE — PRE-PROCEDURE NOTE
Risk, Benefits, and Alternatives discussed with the patient.  History and Physical reviewed, and no changes.  Plan is for moderate sedation, and the patient agrees to proceed with procedure.    An immediate assessment was done prior to the administration of moderate sedation.

## 2024-12-20 NOTE — PROGRESS NOTES
It appears some these labs are duplicates, magnesium normal, however low end of normal to help keep potassium stable, I am adding magnesium supplement to be taken nightly, Rx sent to pharmacy.

## 2024-12-22 LAB — Lab: NORMAL

## 2024-12-27 ENCOUNTER — INFUSION (OUTPATIENT)
Dept: ONCOLOGY | Facility: HOSPITAL | Age: 79
End: 2024-12-27
Payer: MEDICARE

## 2024-12-27 ENCOUNTER — TELEPHONE (OUTPATIENT)
Dept: ONCOLOGY | Facility: CLINIC | Age: 79
End: 2024-12-27
Payer: MEDICARE

## 2024-12-27 VITALS
RESPIRATION RATE: 18 BRPM | HEIGHT: 67 IN | BODY MASS INDEX: 33.58 KG/M2 | HEART RATE: 92 BPM | DIASTOLIC BLOOD PRESSURE: 70 MMHG | TEMPERATURE: 97 F | SYSTOLIC BLOOD PRESSURE: 135 MMHG | OXYGEN SATURATION: 97 %

## 2024-12-27 DIAGNOSIS — E61.1 IRON DEFICIENCY: ICD-10-CM

## 2024-12-27 DIAGNOSIS — K90.9 IRON MALABSORPTION: Primary | ICD-10-CM

## 2024-12-27 LAB
LAB AP CASE REPORT: NORMAL
LAB AP FLOW CYTOMETRY SUMMARY: NORMAL
Lab: NORMAL
PATH REPORT.ADDENDUM SPEC: NORMAL
PATH REPORT.FINAL DX SPEC: NORMAL
PATH REPORT.GROSS SPEC: NORMAL

## 2024-12-27 PROCEDURE — 96366 THER/PROPH/DIAG IV INF ADDON: CPT

## 2024-12-27 PROCEDURE — 25010000002 NA FERRIC GLUC CPLX PER 12.5 MG: Performed by: NURSE PRACTITIONER

## 2024-12-27 PROCEDURE — 25810000003 SODIUM CHLORIDE 0.9 % SOLUTION: Performed by: NURSE PRACTITIONER

## 2024-12-27 PROCEDURE — 96365 THER/PROPH/DIAG IV INF INIT: CPT

## 2024-12-27 RX ORDER — FAMOTIDINE 10 MG/ML
20 INJECTION, SOLUTION INTRAVENOUS AS NEEDED
Status: DISCONTINUED | OUTPATIENT
Start: 2024-12-27 | End: 2024-12-27 | Stop reason: HOSPADM

## 2024-12-27 RX ORDER — DIPHENHYDRAMINE HYDROCHLORIDE 50 MG/ML
50 INJECTION INTRAMUSCULAR; INTRAVENOUS AS NEEDED
OUTPATIENT
Start: 2024-12-27

## 2024-12-27 RX ORDER — DIPHENHYDRAMINE HYDROCHLORIDE 50 MG/ML
50 INJECTION INTRAMUSCULAR; INTRAVENOUS AS NEEDED
Status: DISCONTINUED | OUTPATIENT
Start: 2024-12-27 | End: 2024-12-27 | Stop reason: HOSPADM

## 2024-12-27 RX ORDER — SODIUM CHLORIDE 9 MG/ML
20 INJECTION, SOLUTION INTRAVENOUS ONCE
Status: CANCELLED | OUTPATIENT
Start: 2024-12-27

## 2024-12-27 RX ORDER — FAMOTIDINE 10 MG/ML
20 INJECTION, SOLUTION INTRAVENOUS AS NEEDED
OUTPATIENT
Start: 2024-12-27

## 2024-12-27 RX ORDER — HYDROCORTISONE SODIUM SUCCINATE 100 MG/2ML
100 INJECTION INTRAMUSCULAR; INTRAVENOUS AS NEEDED
OUTPATIENT
Start: 2024-12-27

## 2024-12-27 RX ORDER — HYDROCORTISONE SODIUM SUCCINATE 100 MG/2ML
100 INJECTION INTRAMUSCULAR; INTRAVENOUS AS NEEDED
Status: DISCONTINUED | OUTPATIENT
Start: 2024-12-27 | End: 2024-12-27 | Stop reason: HOSPADM

## 2024-12-27 RX ORDER — SODIUM CHLORIDE 9 MG/ML
20 INJECTION, SOLUTION INTRAVENOUS ONCE
Status: DISCONTINUED | OUTPATIENT
Start: 2024-12-27 | End: 2024-12-27

## 2024-12-27 RX ADMIN — SODIUM CHLORIDE 250 MG: 9 INJECTION, SOLUTION INTRAVENOUS at 12:32

## 2024-12-27 NOTE — PROGRESS NOTES
Pt declined to stay after infusion. Explained to patient the s/s to watch for regarding an adverse reaction and to seek emergency care if needed. MERLYN RN

## 2024-12-27 NOTE — TELEPHONE ENCOUNTER
----- Message from Adia Harris sent at 12/27/2024  1:16 PM CST -----  All positive.  He needs to see GI

## 2024-12-27 NOTE — PROGRESS NOTES
"  Subjective     HISTORY OF PRESENT ILLNESS:     History of Present Illness  ***    Past Medical History, Past Surgical History, Social History, Family History have been reviewed and are without significant changes except as mentioned.    Review of Systems   A comprehensive 14 point review of systems was performed and was negative except as mentioned.    Medications:  The current medication list was reviewed in the EMR    ALLERGIES:  No Known Allergies    Objective      Vitals:    12/27/24 1224   BP: 126/83   Pulse: 100   Resp: 20   Temp: 97.4 °F (36.3 °C)   TempSrc: Temporal   SpO2: 97%   Height: 170.2 cm (67\")   PainSc: 0-No pain         12/9/2024     9:00 AM   Current Status   ECOG score 0       Physical Exam  ***    RECENT LABS:  Hematology WBC   Date Value Ref Range Status   12/20/2024 5.41 3.40 - 10.80 10*3/mm3 Final   12/17/2024 6.9 3.4 - 10.8 x10E3/uL Final     RBC   Date Value Ref Range Status   12/20/2024 3.46 (L) 4.14 - 5.80 10*6/mm3 Final   12/17/2024 3.34 (L) 4.14 - 5.80 x10E6/uL Final     Hemoglobin   Date Value Ref Range Status   12/20/2024 12.1 (L) 13.0 - 17.7 g/dL Final     Hematocrit   Date Value Ref Range Status   12/20/2024 38.3 37.5 - 51.0 % Final     Platelets   Date Value Ref Range Status   12/20/2024 185 140 - 450 10*3/mm3 Final              Assessment & Plan   ***                  12/27/2024      CC:          "

## 2024-12-27 NOTE — TELEPHONE ENCOUNTER
left v/m for meeta at dr winston's office to return my call to discuss pt appt due to iron deficiency and occult blood.

## 2024-12-30 ENCOUNTER — TELEPHONE (OUTPATIENT)
Dept: ONCOLOGY | Facility: CLINIC | Age: 79
End: 2024-12-30
Payer: MEDICARE

## 2024-12-30 NOTE — TELEPHONE ENCOUNTER
Pt is aware of GI appt with Dr car for positive occult blood is on 1/30/25 at 2pm. Advised pt that if he notices increased bleeding or symptoms to give us a call. He v/u

## 2025-01-03 ENCOUNTER — TELEPHONE (OUTPATIENT)
Dept: INTERVENTIONAL RADIOLOGY/VASCULAR | Facility: HOSPITAL | Age: 80
End: 2025-01-03
Payer: MEDICARE

## 2025-01-03 NOTE — TELEPHONE ENCOUNTER
I called and touched base with patient after biopsy. Patient had no complaints or complications after procedure. Patient was very pleased with the care that was provided by the physician and the nurses and how the nurses went above and beyond for him.

## 2025-01-06 ENCOUNTER — TELEPHONE (OUTPATIENT)
Dept: ONCOLOGY | Facility: CLINIC | Age: 80
End: 2025-01-06
Payer: MEDICARE

## 2025-01-06 DIAGNOSIS — D64.9 ANEMIA, UNSPECIFIED TYPE: Primary | ICD-10-CM

## 2025-01-06 NOTE — TELEPHONE ENCOUNTER
----- Message from Adia Harris sent at 1/6/2025 12:44 PM CST -----  Can you check with the patient to confirm that he will be at appointment on the 9th.  Thank you.

## 2025-01-09 ENCOUNTER — OFFICE VISIT (OUTPATIENT)
Dept: ONCOLOGY | Facility: CLINIC | Age: 80
End: 2025-01-09
Payer: MEDICARE

## 2025-01-09 ENCOUNTER — LAB (OUTPATIENT)
Dept: LAB | Facility: HOSPITAL | Age: 80
End: 2025-01-09
Payer: MEDICARE

## 2025-01-09 VITALS
RESPIRATION RATE: 16 BRPM | WEIGHT: 220.6 LBS | OXYGEN SATURATION: 94 % | BODY MASS INDEX: 34.62 KG/M2 | HEIGHT: 67 IN | SYSTOLIC BLOOD PRESSURE: 136 MMHG | DIASTOLIC BLOOD PRESSURE: 84 MMHG | HEART RATE: 93 BPM | TEMPERATURE: 97.8 F

## 2025-01-09 DIAGNOSIS — R16.1 SPLENOMEGALY: ICD-10-CM

## 2025-01-09 DIAGNOSIS — C85.10 B-CELL LYMPHOMA, UNSPECIFIED B-CELL LYMPHOMA TYPE, UNSPECIFIED BODY REGION: Primary | ICD-10-CM

## 2025-01-09 DIAGNOSIS — D50.8 OTHER IRON DEFICIENCY ANEMIA: Primary | ICD-10-CM

## 2025-01-09 DIAGNOSIS — E61.1 IRON DEFICIENCY: ICD-10-CM

## 2025-01-09 DIAGNOSIS — D64.9 ANEMIA, UNSPECIFIED TYPE: ICD-10-CM

## 2025-01-09 DIAGNOSIS — R63.4 WEIGHT LOSS, UNINTENTIONAL: ICD-10-CM

## 2025-01-09 LAB
ALBUMIN SERPL-MCNC: 3.5 G/DL (ref 3.5–5.2)
ALBUMIN/GLOB SERPL: 1.7 G/DL
ALP SERPL-CCNC: 104 U/L (ref 39–117)
ALT SERPL W P-5'-P-CCNC: 11 U/L (ref 1–41)
ANION GAP SERPL CALCULATED.3IONS-SCNC: 10 MMOL/L (ref 5–15)
AST SERPL-CCNC: 26 U/L (ref 1–40)
BILIRUB SERPL-MCNC: 1 MG/DL (ref 0–1.2)
BUN SERPL-MCNC: 8 MG/DL (ref 8–23)
BUN/CREAT SERPL: 9.8 (ref 7–25)
CALCIUM SPEC-SCNC: 8.6 MG/DL (ref 8.6–10.5)
CHLORIDE SERPL-SCNC: 101 MMOL/L (ref 98–107)
CO2 SERPL-SCNC: 26 MMOL/L (ref 22–29)
CREAT SERPL-MCNC: 0.82 MG/DL (ref 0.76–1.27)
EGFRCR SERPLBLD CKD-EPI 2021: 89.9 ML/MIN/1.73
FERRITIN SERPL-MCNC: 350.7 NG/ML (ref 30–400)
GLOBULIN UR ELPH-MCNC: 2.1 GM/DL
GLUCOSE SERPL-MCNC: 98 MG/DL (ref 65–99)
HOLD SPECIMEN: NORMAL
IRON 24H UR-MRATE: 49 MCG/DL (ref 59–158)
IRON SATN MFR SERPL: 18 % (ref 20–50)
POTASSIUM SERPL-SCNC: 3.5 MMOL/L (ref 3.5–5.2)
PROT SERPL-MCNC: 5.6 G/DL (ref 6–8.5)
SODIUM SERPL-SCNC: 137 MMOL/L (ref 136–145)
TIBC SERPL-MCNC: 267 MCG/DL (ref 298–536)
TRANSFERRIN SERPL-MCNC: 179 MG/DL (ref 200–360)

## 2025-01-09 PROCEDURE — 85025 COMPLETE CBC W/AUTO DIFF WBC: CPT | Performed by: NURSE PRACTITIONER

## 2025-01-09 PROCEDURE — 85007 BL SMEAR W/DIFF WBC COUNT: CPT | Performed by: NURSE PRACTITIONER

## 2025-01-09 PROCEDURE — 82728 ASSAY OF FERRITIN: CPT

## 2025-01-09 PROCEDURE — 80053 COMPREHEN METABOLIC PANEL: CPT

## 2025-01-09 PROCEDURE — 83540 ASSAY OF IRON: CPT

## 2025-01-09 PROCEDURE — 36415 COLL VENOUS BLD VENIPUNCTURE: CPT

## 2025-01-09 PROCEDURE — 84466 ASSAY OF TRANSFERRIN: CPT

## 2025-01-09 NOTE — PROGRESS NOTES
[facility]  [address]  [place] [address] [phone]    Patient Name: Kristian Mir  Encounter Date: 01/09/2025   YOB: 1945  Patient Number: [id number]    PROGRESS NOTE    HISTORY OF PRESENT ILLNESS: Kristian Mir is a 79 y.o. male was seen by Dr. Chavira in the hospital on 10/27/24 for anemia . History is obtained from patient and spouse. History is considered to be accurate.    He has a history of iron deficiency anemia and is currently taking iron supplements daily. Per patient, six months ago, he experienced significant blood loss and received 4 to 5 units of blood and intravenous iron. During his recent hospitalization, it was noted that his spleen was enlarged. He underwent two ultrasounds and an x-ray at [facility], the results of which were sent here. His bilirubin level was recorded as 5.7.  The patient quit drinking on 10/22/2024.     He has some heart issues and had a Watchman device implanted. He was taken off all blood thinners except baby aspirin 81 mg. He has been having high blood pressure for years and is on medication for it. He reports no heart failure but has an irregular heartbeat.     At previous visit patient had a flow cytometry performed which showed  CD5/CD10 negative clonal B cells (8% of leukocytes).  He had labs drawn today and results were reviewed with him in office.      History of Present Illness  The patient presents for  follow up of iron deficiency and to review results of bone marrow biopsy.    He reports no fevers or night sweats.    Bone marrow 12/20/2024:  Flow cytometry:   Clonal B-cell population detected (3% of analyzed cells), CD5 negative, CD10 negative, with kappa light chain restriction.  Myeloid blasts are not relatively increased but show partial aberrant CD7 expression.  Morphology:   Findings compatible with a low-level marrow  involvement by a small mature B-cell lymphoma with nonspecific immunophenotype, interstitial & sinusoidal infiltrate of B cells accounting for approximately 15% of marrow nucleated cells.  Hypercellular marrow for age (~50-60%) with trilineage hematopoiesis, mildly increased megakaryocytes and no increased blast.  No storage iron detected.  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    He has an upcoming appointment with Dr. Seymour, a gastroenterologist, on 01/30/2025 at 2:00 PM.              PAST MEDICAL HISTORY:  ALLERGIES:  No Known Allergies  CURRENT MEDICATIONS:  Outpatient Encounter Medications as of 1/9/2025   Medication Sig Dispense Refill    allopurinol (ZYLOPRIM) 300 MG tablet Take 1 tablet by mouth Every Morning.      aspirin 81 MG EC tablet Take 1 tablet by mouth Daily.      atorvastatin (LIPITOR) 40 MG tablet Take 0.5 tablets by mouth Every Night.      empagliflozin (JARDIANCE) 25 MG tablet tablet Take 0.5 tablets by mouth Daily.      ferrous sulfate 325 (65 FE) MG tablet Take 1 tablet by mouth Daily With Breakfast.      furosemide (LASIX) 20 MG tablet Take 1 tablet by mouth Every Morning.      metoprolol succinate XL (TOPROL-XL) 100 MG 24 hr tablet Take 0.5 tablets by mouth Every Morning.      pantoprazole (PROTONIX) 40 MG EC tablet Take 1 tablet by mouth Every Morning.      potassium chloride (KLOR-CON M20) 20 MEQ CR tablet Take 1 tablet by mouth Daily. 10 tablet 0    spironolactone (ALDACTONE) 25 MG tablet Take 1 tablet by mouth Every Morning.      tamsulosin (FLOMAX) 0.4 MG capsule 24 hr capsule Take 1 capsule by mouth Every Evening.      [DISCONTINUED] magnesium oxide (MAG-OX) 400 MG tablet Take 0.5 tablets by mouth Daily. (Patient not taking: Reported on 1/9/2025) 45 tablet 1    [DISCONTINUED] potassium chloride (KLOR-CON M20) 20 MEQ CR tablet Take 0.5 tablets by mouth Daily.  (Patient not taking: Reported on 1/9/2025)       No facility-administered encounter medications on file as of 1/9/2025.     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    Skin abrasion T14.8XXA    Skin tear of right elbow without complication S51.011A    Venous insufficiency I87.2    Iron deficiency anemia D50.9    Chronic blood loss anemia D50.0    Chronic diastolic (congestive) heart failure I50.32    Chronic systolic heart failure I50.22    Presence of Watchman left atrial appendage closure device Z95.818    Acute alcoholic hepatitis K70.10    Anasarca R60.1    Alcohol abuse, in remission F10.10    Cellulitis both upper extremeties L03.90    Hyperbilirubinemia E80.6    Chronic atrial fibrillation I48.20    Acquired hypothyroidism E03.9    Class 2 severe obesity due to excess calories with serious comorbidity and body mass index (BMI) of 35.0 to 35.9 in adult E66.812, E66.01, Z68.35    Hypokalemia E87.6    Iron deficiency E61.1    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       HEALTH MAINTENANCE ITEMS:  Health Maintenance Due   Topic Date Due    ZOSTER VACCINE (2 of 2) 10/22/2024    ANNUAL WELLNESS VISIT  03/22/2025       <no information>  Last Completed Colonoscopy            COLORECTAL CANCER SCREENING (COLONOSCOPY - Every 5 Years) Next due on 6/30/2028 07/23/2023  Fecal Occult Blood component of Occult Blood X 1, Stool - Stool, Per Rectum    06/30/2023  COLONOSCOPY    06/30/2023  Surgical Procedure: COLONOSCOPY    05/17/2023  POC Occult Blood X 3, Stool    04/15/2022  COLONOSCOPY    Only the first 5 history entries have been loaded, but more history exists.               "    Immunization History   Administered Date(s) Administered    Arexvy (RSV, Adults 60+ yrs) 2023    COVID-19 (MODERNA) 1st,2nd,3rd Dose Monovalent 2021, 2021, 2022    COVID-19 (MODERNA) BIVALENT 12+YRS 2022    COVID-19 (MODERNA) Monovalent Original Booster 2021, 2022    COVID-19 (PFIZER) 12YRS+ (COMIRNATY) 2023, 2024    FLUAD TRI 65YR+ 2024    Fluad Quad 65+ 2020    Fluzone (or Fluarix & Flulaval for VFC) >6mos 2015    Fluzone High-Dose 65+YRS 2017, 2018, 2019    Fluzone High-Dose 65+yrs 10/26/2022, 2023    Pneumococcal Conjugate 13-Valent (PCV13) 2019    Pneumococcal Conjugate 20-Valent (PCV20) 2023    Shingrix 2024    Tdap 05/15/2023     Last Completed Mammogram       This patient has no relevant Health Maintenance data.              FAMILY HISTORY:  Family History   Problem Relation Age of Onset    Cancer Mother     Lung cancer Mother         Smoker    Cervical cancer Mother     Heart disease Father     Heart failure Father     Colon cancer Brother     Colon polyps Neg Hx     Esophageal cancer Neg Hx      SOCIAL HISTORY:  Social History     Socioeconomic History    Marital status:    Tobacco Use    Smoking status: Former     Current packs/day: 0.00     Types: Cigarettes     Quit date: 2005     Years since quittin.0     Passive exposure: Past    Smokeless tobacco: Never   Vaping Use    Vaping status: Never Used   Substance and Sexual Activity    Alcohol use: Not Currently     Alcohol/week: 11.0 standard drinks of alcohol     Types: 3 Cans of beer, 8 Shots of liquor per week     Comment: daily 2-3- vodka cranberry- nothing since 10/22/24    Drug use: Never    Sexual activity: Not Currently     Partners: Female       REVIEW OF SYSTEMS:  Review of Systems    /84   Pulse 93   Temp 97.8 °F (36.6 °C)   Resp 16   Ht 170.2 cm (67\")   Wt 100 kg (220 lb 9.6 oz)   SpO2 94%   BMI " 34.55 kg/m²  Body surface area is 2.11 meters squared.    Pain Score    01/09/25 0846   PainSc: 0-No pain        Physical Exam  Constitutional:       Appearance: Normal appearance.   HENT:      Head: Normocephalic and atraumatic.   Cardiovascular:      Rate and Rhythm: Normal rate and regular rhythm.   Pulmonary:      Effort: Pulmonary effort is normal.      Breath sounds: Normal breath sounds.   Abdominal:      General: Bowel sounds are normal.      Palpations: Abdomen is soft.   Musculoskeletal:      Right lower leg: Edema present.      Left lower leg: Edema present.   Skin:     General: Skin is warm and dry.   Neurological:      Mental Status: He is alert and oriented to person, place, and time.   Psychiatric:         Attention and Perception: Attention normal.         Mood and Affect: Mood normal.         Judgment: Judgment normal.       I have reviewed the PHYSICAL EXAM and the accuracy of it. No changes since the information was documented.  Adia Harris, APRN 01/09/2025    Kristian Mir reports a pain score of 0.  Given his pain assessment as noted, treatment options were discussed and the following options were decided upon as a follow-up plan to address the patient's pain:  NO intervention indicated.  .      ASSESSMENT / PLAN:  Recent Results (from the past week)   Comprehensive Metabolic Panel    Collection Time: 01/09/25  8:30 AM    Specimen: Arm, Right; Blood   Result Value Ref Range    Glucose 98 65 - 99 mg/dL    BUN 8 8 - 23 mg/dL    Creatinine 0.82 0.76 - 1.27 mg/dL    Sodium 137 136 - 145 mmol/L    Potassium 3.5 3.5 - 5.2 mmol/L    Chloride 101 98 - 107 mmol/L    CO2 26.0 22.0 - 29.0 mmol/L    Calcium 8.6 8.6 - 10.5 mg/dL    Total Protein 5.6 (L) 6.0 - 8.5 g/dL    Albumin 3.5 3.5 - 5.2 g/dL    ALT (SGPT) 11 1 - 41 U/L    AST (SGOT) 26 1 - 40 U/L    Alkaline Phosphatase 104 39 - 117 U/L    Total Bilirubin 1.0 0.0 - 1.2 mg/dL    Globulin 2.1 gm/dL    A/G Ratio 1.7 g/dL    BUN/Creatinine Ratio 9.8  7.0 - 25.0    Anion Gap 10.0 5.0 - 15.0 mmol/L    eGFR 89.9 >60.0 mL/min/1.73   CBC Auto Differential    Collection Time: 01/09/25  8:30 AM    Specimen: Arm, Left; Blood   Result Value Ref Range    WBC 7.93 3.40 - 10.80 10*3/mm3    RBC 3.08 (L) 4.14 - 5.80 10*6/mm3    Hemoglobin 10.7 (L) 13.0 - 17.7 g/dL    Hematocrit 32.6 (L) 37.5 - 51.0 %    .8 (H) 79.0 - 97.0 fL    MCH 34.7 (H) 26.6 - 33.0 pg    MCHC 32.8 31.5 - 35.7 g/dL    RDW 16.0 (H) 12.3 - 15.4 %    RDW-SD 61.1 (H) 37.0 - 54.0 fl    MPV 9.3 6.0 - 12.0 fL    Platelets 206 140 - 450 10*3/mm3   Gold Top - SST    Collection Time: 01/09/25  8:30 AM   Result Value Ref Range    Extra Tube Hold for add-ons.    Iron Profile    Collection Time: 01/09/25  8:30 AM    Specimen: Arm, Right; Blood   Result Value Ref Range    Iron 49 (L) 59 - 158 mcg/dL    Iron Saturation (TSAT) 18 (L) 20 - 50 %    Transferrin 179 (L) 200 - 360 mg/dL    TIBC 267 (L) 298 - 536 mcg/dL   Ferritin    Collection Time: 01/09/25  8:30 AM    Specimen: Arm, Right; Blood   Result Value Ref Range    Ferritin 350.70 30.00 - 400.00 ng/mL   Manual Differential    Collection Time: 01/09/25  8:30 AM    Specimen: Arm, Left; Blood   Result Value Ref Range    Neutrophil % 50.0 42.7 - 76.0 %    Lymphocyte % 25.0 19.6 - 45.3 %    Monocyte % 5.0 5.0 - 12.0 %    Eosinophil % 2.0 0.3 - 6.2 %    Basophil % 0.0 0.0 - 1.5 %    Bands %  8.0 (H) 0.0 - 5.0 %    Atypical Lymphocyte % 10.0 (H) 0.0 - 5.0 %    Neutrophils Absolute 4.60 1.70 - 7.00 10*3/mm3    Lymphocytes Absolute 2.78 0.70 - 3.10 10*3/mm3    Monocytes Absolute 0.40 0.10 - 0.90 10*3/mm3    Eosinophils Absolute 0.16 0.00 - 0.40 10*3/mm3    Basophils Absolute 0.00 0.00 - 0.20 10*3/mm3    Anisocytosis Mod/2+ None Seen    Macrocytes Slight/1+ None Seen    Ovalocytes Slight/1+ None Seen    Poikilocytes Slight/1+ None Seen    WBC Morphology Normal Normal    Platelet Morphology Normal Normal     Assessment:      1. B-cell lymphoma, unspecified B-cell  lymphoma type, unspecified body region    2. Splenomegaly    3. Iron deficiency    4. Weight loss, unintentional       Bone marrow 12/20/2024:  Flow cytometry:   Clonal B-cell population detected (3% of analyzed cells), CD5 negative, CD10 negative, with kappa light chain restriction.  Myeloid blasts are not relatively increased but show partial aberrant CD7 expression.  Morphology:   Findings compatible with a low-level marrow involvement by a small mature B-cell lymphoma with nonspecific immunophenotype, interstitial & sinusoidal infiltrate of B cells accounting for approximately 15% of marrow nucleated cells.  Hypercellular marrow for age (~50-60%) with trilineage hematopoiesis, mildly increased megakaryocytes and no increased blast.  No storage iron detected.  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 NEW DIAGNOSIS Lymphoma - The bone marrow results indicate the presence of mature B-cell lymphoma, and there is also an enlargement of the spleen. Weight loss is a common symptom associated with lymphoma.  -Bone marrow was reviewed and his case was discussed with Dr. Lopez who recommends referral to tertiary care  - A referral to [facility] will be initiated for further evaluation and treatment planning.  - A copy of the report will be provided to the patient.  - The patient will be contacted by [facility] regarding the appointment details.    2. Iron deficiency - The patient has been receiving iron infusions, with the most recent ones administered on December 20 and 27.  - The CBC results are pending.  - A recheck of iron levels will be ordered to ensure improvement.    3. Blood in stool - The patient has a history of blood in the stool, confirmed by stool cards.  - An appointment with Dr. Seymour, a gastroenterologist, is scheduled for January 30 at 2:00 PM to further investigate this issue.    4. Weight loss - The patient has experienced a weight  loss of 10 pounds since December 9, which may be related to lymphoma.  - This will be monitored, and the patient is advised to ensure accurate weight measurements are taken during future visits.    Follow-up  - The patient will follow up in 6 weeks contingent on recommendations from [place]    And wife voiced understanding and agreed to this treatment plan             ACP discussion was held with the patient during this visit. Patient has an advance directive in EMR which is still valid.          JONG Estrella  01/09/2025     Patient or patient representative verbalized consent for the use of Ambient Listening during the visit with  JONG Estrella for chart documentation.

## 2025-01-21 ENCOUNTER — TRANSCRIBE ORDERS (OUTPATIENT)
Dept: ADMINISTRATIVE | Facility: HOSPITAL | Age: 80
End: 2025-01-21
Payer: MEDICARE

## 2025-01-21 DIAGNOSIS — C85.17 B-CELL LYMPHOMA OF SPLEEN, UNSPECIFIED B-CELL LYMPHOMA TYPE: Primary | ICD-10-CM

## 2025-01-21 DIAGNOSIS — C83.07 MARGINAL ZONE LYMPHOMA OF SPLEEN: ICD-10-CM

## 2025-01-30 ENCOUNTER — OFFICE VISIT (OUTPATIENT)
Dept: GASTROENTEROLOGY | Facility: CLINIC | Age: 80
End: 2025-01-30
Payer: MEDICARE

## 2025-01-30 VITALS
TEMPERATURE: 97 F | HEART RATE: 94 BPM | OXYGEN SATURATION: 97 % | SYSTOLIC BLOOD PRESSURE: 134 MMHG | HEIGHT: 66 IN | BODY MASS INDEX: 34.55 KG/M2 | WEIGHT: 215 LBS | DIASTOLIC BLOOD PRESSURE: 78 MMHG

## 2025-01-30 DIAGNOSIS — R19.5 HEME POSITIVE STOOL: Primary | ICD-10-CM

## 2025-01-30 RX ORDER — ALBUTEROL SULFATE 90 UG/1
2 INHALANT RESPIRATORY (INHALATION) EVERY 4 HOURS PRN
COMMUNITY

## 2025-01-30 NOTE — PROGRESS NOTES
Chief Complaint   Patient presents with    Black or Bloody Stool     Positive stool card       PCP: Norberto Candelario MD  REFER: No ref. provider found    Subjective     HPI                Doing much better from the liver /bilirubin perspective  Referred due to heme positive stool back in Dec   Saw [facility] and has a pet scan prior to a final dx  H/U going up   Last c-scope 6/23 / 5 yr recall  EGD at the same time         Past Medical History:   Diagnosis Date    Arrhythmia     Arthritis     Asthma     Atrial fibrillation     Cancer     skin    Chronic anticoagulation     Clotting disorder     COPD (chronic obstructive pulmonary disease)     GERD (gastroesophageal reflux disease)     History of transfusion     1992 bleeding ulcers dr gonzalez    Hyperlipidemia     Hypertension     Non-ischemic cardiomyopathy 10/14/2021       Past Surgical History:   Procedure Laterality Date    ATRIAL APPENDAGE EXCLUSION LEFT WITH TRANSESOPHAGEAL ECHOCARDIOGRAM Right 09/12/2023    Procedure: Atrial Appendage Occlusion;  Surgeon: Daniel Clayton MD;  Location: [facility] CATH INVASIVE LOCATION;  Service: Cardiology;  Laterality: Right;    CARDIAC CATHETERIZATION      CARDIOVERSION      10/21 and 11/21 dr dawson richardson    CATARACT EXTRACTION Bilateral     laser juanita yoni richardson    COLONOSCOPY N/A 04/15/2022    Procedure: COLONOSCOPY WITH ANESTHESIA;  Surgeon: Fly Seymour DO;  Location: [facility] ENDOSCOPY;  Service: Gastroenterology;  Laterality: N/A;  pre anemia, screen  post polyps, diverticulosis  Norberto Hannah MD    COLONOSCOPY N/A 06/30/2023    Procedure: COLONOSCOPY WITH ANESTHESIA;  Surgeon: Fly Seymour DO;  Location: [facility] ENDOSCOPY;  Service: Gastroenterology;  Laterality: N/A;  Pre: Anemia;  Post: Diverticulosis;  Norberto Candelario MD    ENDOSCOPY N/A 05/09/2023    Procedure: ESOPHAGOGASTRODUODENOSCOPY WITH ANESTHESIA;  Surgeon: Fly Seymour DO;  Location: [facility] ENDOSCOPY;  Service:  Gastroenterology;  Laterality: N/A;  Pre: Anemia  Post: Duodenitis, Gastritis  Norberto Candelario MD    JOINT REPLACEMENT Bilateral     hip    SKIN CANCER EXCISION Right 10/2023    TOTAL SHOULDER ARTHROPLASTY W/ DISTAL CLAVICLE EXCISION Left 2023    Procedure: LEFT REVERSE TOTAL SHOULDER ARTHROPLASTY;  Surgeon: Callum Hernandez MD;  Location: [facility];  Service: Orthopedics;  Laterality: Left;       Outpatient Medications Marked as Taking for the 25 encounter (Office Visit) with Fly Seymour, DO   Medication Sig Dispense Refill    albuterol sulfate  (90 Base) MCG/ACT inhaler Inhale 2 puffs Every 4 (Four) Hours As Needed for Wheezing.      allopurinol (ZYLOPRIM) 300 MG tablet Take 1 tablet by mouth Every Morning.      aspirin 81 MG EC tablet Take 1 tablet by mouth Daily.      atorvastatin (LIPITOR) 40 MG tablet Take 0.5 tablets by mouth Every Night.      empagliflozin (JARDIANCE) 25 MG tablet tablet Take 0.5 tablets by mouth Daily.      ferrous sulfate 325 (65 FE) MG tablet Take 1 tablet by mouth Daily With Breakfast.      furosemide (LASIX) 20 MG tablet Take 1 tablet by mouth Every Morning.      metoprolol succinate XL (TOPROL-XL) 100 MG 24 hr tablet Take 0.5 tablets by mouth Every Morning.      pantoprazole (PROTONIX) 40 MG EC tablet Take 1 tablet by mouth Every Morning.      potassium chloride (KLOR-CON M20) 20 MEQ CR tablet Take 1 tablet by mouth Daily. 10 tablet 0    spironolactone (ALDACTONE) 25 MG tablet Take 1 tablet by mouth Every Morning.      tamsulosin (FLOMAX) 0.4 MG capsule 24 hr capsule Take 1 capsule by mouth Every Evening.         No Known Allergies    Social History     Socioeconomic History    Marital status:    Tobacco Use    Smoking status: Former     Current packs/day: 0.00     Types: Cigarettes     Quit date: 2005     Years since quittin.0     Passive exposure: Past    Smokeless tobacco: Never   Vaping Use    Vaping status: Never Used   Substance  "and Sexual Activity    Alcohol use: Not Currently     Alcohol/week: 11.0 standard drinks of alcohol     Types: 3 Cans of beer, 8 Shots of liquor per week     Comment: daily 2-3- vodka cranberry- nothing since 10/22/24    Drug use: Never    Sexual activity: Not Currently     Partners: Female       Review of Systems   Constitutional:  Negative for fever and unexpected weight change.   HENT:  Negative for trouble swallowing.    Respiratory:  Negative for shortness of breath.    Cardiovascular:  Negative for chest pain.   Gastrointestinal:  Negative for abdominal pain and anal bleeding.       Objective     Vitals:    01/30/25 1355   BP: 134/78   Pulse: 94   Temp: 97 °F (36.1 °C)   SpO2: 97%   Weight: 97.5 kg (215 lb)   Height: 167.6 cm (66\")     Body mass index is 34.7 kg/m².    Physical Exam  Constitutional:       Appearance: Normal appearance. He is well-developed.   Eyes:      General: No scleral icterus.  Cardiovascular:      Heart sounds: Normal heart sounds. No murmur heard.  Pulmonary:      Effort: Pulmonary effort is normal.   Abdominal:      General: Bowel sounds are normal. There is no distension.      Palpations: Abdomen is soft.      Tenderness: There is no abdominal tenderness. There is no guarding.   Skin:     General: Skin is warm and dry.      Coloration: Skin is not jaundiced.   Neurological:      Mental Status: He is alert.   Psychiatric:         Behavior: Behavior is cooperative.         Imaging Results (Most Recent)       None            Body mass index is 34.7 kg/m².    Assessment & Plan     Diagnoses and all orders for this visit:    1. Heme positive stool (Primary)             Will review PET scan and discuss with the pt   Realizing he has an upcoming Heme onc qappt in [place] fairly soon  Takes a baby asa daily   * Surgery not found *        Advised pt to stop use of NSAIDs, Fish Oil, and MV 5 days prior to procedure, per Dr Seymour protocol.  Tylenol based products are ok to take.  Pt " verbalized understanding.        Fly Seymour, DO  01/30/25          There are no Patient Instructions on file for this visit.

## 2025-02-04 ENCOUNTER — HOSPITAL ENCOUNTER (OUTPATIENT)
Dept: CT IMAGING | Facility: HOSPITAL | Age: 80
Discharge: HOME | End: 2025-02-04
Payer: MEDICARE

## 2025-02-04 DIAGNOSIS — C83.07 MARGINAL ZONE LYMPHOMA OF SPLEEN: ICD-10-CM

## 2025-02-04 DIAGNOSIS — C85.17 B-CELL LYMPHOMA OF SPLEEN, UNSPECIFIED B-CELL LYMPHOMA TYPE: ICD-10-CM

## 2025-02-04 PROCEDURE — 34310000005 FLUDEOXYGLUCOSE F18 SOLUTION: Performed by: INTERNAL MEDICINE

## 2025-02-04 PROCEDURE — A9552 F18 FDG: HCPCS | Performed by: INTERNAL MEDICINE

## 2025-02-04 PROCEDURE — 78815 PET IMAGE W/CT SKULL-THIGH: CPT

## 2025-02-04 RX ADMIN — FLUDEOXYGLUCOSE F18 1 DOSE: 300 INJECTION INTRAVENOUS at 09:30

## 2025-02-06 ENCOUNTER — TELEPHONE (OUTPATIENT)
Dept: GASTROENTEROLOGY | Facility: CLINIC | Age: 80
End: 2025-02-06
Payer: MEDICARE

## 2025-02-06 NOTE — TELEPHONE ENCOUNTER
----- Message from Dimitry REECE sent at 1/30/2025  2:28 PM CST -----  Regarding: PET scan  02/04/2025 - Get Pet scan for Dr. Seymour

## 2025-02-18 DIAGNOSIS — D50.8 OTHER IRON DEFICIENCY ANEMIA: ICD-10-CM

## 2025-02-18 DIAGNOSIS — K90.9 IRON MALABSORPTION: Primary | ICD-10-CM

## 2025-03-18 ENCOUNTER — LAB (OUTPATIENT)
Dept: LAB | Facility: HOSPITAL | Age: 80
End: 2025-03-18
Payer: MEDICARE

## 2025-03-18 ENCOUNTER — LAB (OUTPATIENT)
Dept: INTERNAL MEDICINE | Facility: CLINIC | Age: 80
End: 2025-03-18
Payer: MEDICARE

## 2025-03-18 ENCOUNTER — OFFICE VISIT (OUTPATIENT)
Dept: ONCOLOGY | Facility: CLINIC | Age: 80
End: 2025-03-18
Payer: MEDICARE

## 2025-03-18 VITALS
HEIGHT: 67 IN | TEMPERATURE: 97.6 F | OXYGEN SATURATION: 95 % | RESPIRATION RATE: 16 BRPM | SYSTOLIC BLOOD PRESSURE: 132 MMHG | DIASTOLIC BLOOD PRESSURE: 78 MMHG | WEIGHT: 208 LBS | BODY MASS INDEX: 32.65 KG/M2 | HEART RATE: 70 BPM

## 2025-03-18 DIAGNOSIS — D50.8 OTHER IRON DEFICIENCY ANEMIA: ICD-10-CM

## 2025-03-18 DIAGNOSIS — E83.42 HYPOMAGNESEMIA: Primary | ICD-10-CM

## 2025-03-18 DIAGNOSIS — C85.80 MARGINAL ZONE B-CELL LYMPHOMA: Primary | ICD-10-CM

## 2025-03-18 DIAGNOSIS — E61.1 IRON DEFICIENCY: ICD-10-CM

## 2025-03-18 DIAGNOSIS — I10 PRIMARY HYPERTENSION: ICD-10-CM

## 2025-03-18 DIAGNOSIS — E03.9 HYPOTHYROIDISM, UNSPECIFIED TYPE: ICD-10-CM

## 2025-03-18 DIAGNOSIS — D50.9 IRON DEFICIENCY ANEMIA, UNSPECIFIED IRON DEFICIENCY ANEMIA TYPE: ICD-10-CM

## 2025-03-18 LAB
ALBUMIN SERPL-MCNC: 3.9 G/DL (ref 3.5–5.2)
ALBUMIN/GLOB SERPL: 1.9 G/DL
ALP SERPL-CCNC: 107 U/L (ref 39–117)
ALT SERPL W P-5'-P-CCNC: 18 U/L (ref 1–41)
ANION GAP SERPL CALCULATED.3IONS-SCNC: 13 MMOL/L (ref 5–15)
AST SERPL-CCNC: 36 U/L (ref 1–40)
BASOPHILS # BLD AUTO: 0.02 10*3/MM3 (ref 0–0.2)
BASOPHILS NFR BLD AUTO: 0.4 % (ref 0–1.5)
BILIRUB SERPL-MCNC: 0.8 MG/DL (ref 0–1.2)
BUN SERPL-MCNC: 13 MG/DL (ref 8–23)
BUN/CREAT SERPL: 12.4 (ref 7–25)
CALCIUM SPEC-SCNC: 9.1 MG/DL (ref 8.6–10.5)
CHLORIDE SERPL-SCNC: 101 MMOL/L (ref 98–107)
CO2 SERPL-SCNC: 25 MMOL/L (ref 22–29)
CREAT SERPL-MCNC: 1.05 MG/DL (ref 0.76–1.27)
DEPRECATED RDW RBC AUTO: 71.3 FL (ref 37–54)
EGFRCR SERPLBLD CKD-EPI 2021: 72.7 ML/MIN/1.73
EOSINOPHIL # BLD AUTO: 0.25 10*3/MM3 (ref 0–0.4)
EOSINOPHIL NFR BLD AUTO: 4.4 % (ref 0.3–6.2)
ERYTHROCYTE [DISTWIDTH] IN BLOOD BY AUTOMATED COUNT: 19.5 % (ref 12.3–15.4)
FERRITIN SERPL-MCNC: 150.4 NG/ML (ref 30–400)
GLOBULIN UR ELPH-MCNC: 2.1 GM/DL
GLUCOSE SERPL-MCNC: 98 MG/DL (ref 65–99)
HCT VFR BLD AUTO: 34.6 % (ref 37.5–51)
HGB BLD-MCNC: 11.4 G/DL (ref 13–17.7)
IMM GRANULOCYTES # BLD AUTO: 0.03 10*3/MM3 (ref 0–0.05)
IMM GRANULOCYTES NFR BLD AUTO: 0.5 % (ref 0–0.5)
IRON 24H UR-MRATE: 64 MCG/DL (ref 59–158)
IRON SATN MFR SERPL: 21 % (ref 20–50)
LYMPHOCYTES # BLD AUTO: 1.79 10*3/MM3 (ref 0.7–3.1)
LYMPHOCYTES NFR BLD AUTO: 31.8 % (ref 19.6–45.3)
MCH RBC QN AUTO: 33.2 PG (ref 26.6–33)
MCHC RBC AUTO-ENTMCNC: 32.9 G/DL (ref 31.5–35.7)
MCV RBC AUTO: 100.9 FL (ref 79–97)
MONOCYTES # BLD AUTO: 0.39 10*3/MM3 (ref 0.1–0.9)
MONOCYTES NFR BLD AUTO: 6.9 % (ref 5–12)
NEUTROPHILS NFR BLD AUTO: 3.15 10*3/MM3 (ref 1.7–7)
NEUTROPHILS NFR BLD AUTO: 56 % (ref 42.7–76)
NRBC BLD AUTO-RTO: 0 /100 WBC (ref 0–0.2)
PLATELET # BLD AUTO: 183 10*3/MM3 (ref 140–450)
PMV BLD AUTO: 9.9 FL (ref 6–12)
POTASSIUM SERPL-SCNC: 3.9 MMOL/L (ref 3.5–5.2)
PROT SERPL-MCNC: 6 G/DL (ref 6–8.5)
RBC # BLD AUTO: 3.43 10*6/MM3 (ref 4.14–5.8)
SODIUM SERPL-SCNC: 139 MMOL/L (ref 136–145)
TIBC SERPL-MCNC: 307 MCG/DL (ref 298–536)
TRANSFERRIN SERPL-MCNC: 206 MG/DL (ref 200–360)
WBC NRBC COR # BLD AUTO: 5.63 10*3/MM3 (ref 3.4–10.8)

## 2025-03-18 PROCEDURE — 3078F DIAST BP <80 MM HG: CPT | Performed by: NURSE PRACTITIONER

## 2025-03-18 PROCEDURE — 84466 ASSAY OF TRANSFERRIN: CPT

## 2025-03-18 PROCEDURE — 99214 OFFICE O/P EST MOD 30 MIN: CPT | Performed by: NURSE PRACTITIONER

## 2025-03-18 PROCEDURE — 83540 ASSAY OF IRON: CPT

## 2025-03-18 PROCEDURE — 85025 COMPLETE CBC W/AUTO DIFF WBC: CPT

## 2025-03-18 PROCEDURE — 82728 ASSAY OF FERRITIN: CPT

## 2025-03-18 PROCEDURE — 3075F SYST BP GE 130 - 139MM HG: CPT | Performed by: NURSE PRACTITIONER

## 2025-03-18 PROCEDURE — 80053 COMPREHEN METABOLIC PANEL: CPT

## 2025-03-18 PROCEDURE — 36415 COLL VENOUS BLD VENIPUNCTURE: CPT

## 2025-03-18 PROCEDURE — 1126F AMNT PAIN NOTED NONE PRSNT: CPT | Performed by: NURSE PRACTITIONER

## 2025-03-18 NOTE — PROGRESS NOTES
[facility]Y  [address]  [place] [address] [phone]    Patient Name: Kristian Mir  Encounter Date: 03/18/2025   YOB: 1945  Patient Number: [id number]    PROGRESS NOTE    HISTORY OF PRESENT ILLNESS: Kristian Mir is a 79 y.o. male was seen by Dr. Chavira in the hospital on 10/27/24 for anemia.  He has followed with me in the office after that.  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        Bone marrow 12/20/2024:  Flow cytometry:   Clonal B-cell population detected (3% of analyzed cells), CD5 negative, CD10 negative, with kappa light chain restriction.  Myeloid blasts are not relatively increased but show partial aberrant CD7 expression.  Morphology:   Findings compatible with a low-level marrow involvement by a small mature B-cell lymphoma with nonspecific immunophenotype, interstitial & sinusoidal infiltrate of B cells accounting for approximately 15% of marrow nucleated cells.  Hypercellular marrow for age (~50-60%) with trilineage hematopoiesis, mildly increased megakaryocytes and no increased blast.  No storage iron detected.  Cytogenics: Normal male karyotype with a loss of Y chromosome 45,X,-Y [14]/46, XY [6]    He also has a history of iron deficiency anemia and is currently taking iron supplements daily.      History of Present Illness   The patient presents for follow up for low-grade marginal zone lymphoma.  Since last visit, he was seen by Dr. Moore at [facility]      He reports feeling well overall, with the exception of a recent episode of wheezing. He has an upcoming appointment scheduled with Dr. Candelario on Monday. His next appointment with Dr. Seymour is scheduled for 06/17/2024 at 10:00 AM.         PAST MEDICAL HISTORY:  ALLERGIES:  No Known Allergies  CURRENT MEDICATIONS:  Outpatient Encounter  Medications as of 3/18/2025   Medication Sig Dispense Refill    albuterol sulfate  (90 Base) MCG/ACT inhaler Inhale 2 puffs Every 4 (Four) Hours As Needed for Wheezing.      allopurinol (ZYLOPRIM) 300 MG tablet Take 1 tablet by mouth Every Morning.      aspirin 81 MG EC tablet Take 1 tablet by mouth Daily.      atorvastatin (LIPITOR) 40 MG tablet Take 0.5 tablets by mouth Every Night.      empagliflozin (JARDIANCE) 25 MG tablet tablet Take 0.5 tablets by mouth Daily.      ferrous sulfate 325 (65 FE) MG tablet Take 1 tablet by mouth Daily With Breakfast.      furosemide (LASIX) 20 MG tablet Take 1 tablet by mouth Every Morning.      metoprolol succinate XL (TOPROL-XL) 100 MG 24 hr tablet Take 0.5 tablets by mouth Every Morning.      pantoprazole (PROTONIX) 40 MG EC tablet Take 1 tablet by mouth Every Morning.      potassium chloride (KLOR-CON M20) 20 MEQ CR tablet Take 1 tablet by mouth Daily. 10 tablet 0    spironolactone (ALDACTONE) 25 MG tablet Take 1 tablet by mouth Every Morning.      tamsulosin (FLOMAX) 0.4 MG capsule 24 hr capsule Take 1 capsule by mouth Every Evening.       No facility-administered encounter medications on file as of 3/18/2025.     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    Skin abrasion T14.8XXA    Skin tear of right elbow without complication S51.011A    Venous insufficiency I87.2    Iron deficiency anemia D50.9    Chronic blood loss anemia D50.0    Chronic diastolic (congestive) heart failure I50.32    Chronic systolic heart failure I50.22    Presence of Watchman left atrial appendage closure device Z95.818    Acute alcoholic  hepatitis K70.10    Anasarca R60.1    Alcohol abuse, in remission F10.10    Cellulitis both upper extremeties L03.90    Hyperbilirubinemia E80.6    Chronic atrial fibrillation I48.20    Acquired hypothyroidism E03.9    Class 2 severe obesity due to excess calories with serious comorbidity and body mass index (BMI) of 35.0 to 35.9 in adult E66.812, E66.01, Z68.35    Hypokalemia E87.6    Iron deficiency E61.1    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       HEALTH MAINTENANCE ITEMS:  Health Maintenance Due   Topic Date Due    COVID-19 Vaccine (9 - Mixed Product risk 2024-25 season) 03/16/2025    ANNUAL WELLNESS VISIT  03/22/2025       <no information>  Last Completed Colonoscopy            Upcoming       COLORECTAL CANCER SCREENING (COLONOSCOPY - Every 5 Years) Next due on 6/30/2028 07/23/2023  Fecal Occult Blood component of Occult Blood X 1, Stool - Stool, Per Rectum    06/30/2023  Surgical Procedure: COLONOSCOPY    06/30/2023  COLONOSCOPY    05/17/2023  POC Occult Blood X 3, Stool    04/15/2022  COLONOSCOPY     Only the first 5 history entries have been loaded, but more history exists.                        Immunization History   Administered Date(s) Administered    Arexvy (RSV, Adults 60+ yrs) 12/28/2023    COVID-19 (MODERNA) 1st,2nd,3rd Dose Monovalent 01/08/2021, 02/05/2021, 11/01/2022    COVID-19 (MODERNA) BIVALENT 12+YRS 11/01/2022    COVID-19 (MODERNA) Monovalent Original Booster 11/18/2021, 04/06/2022    COVID-19 (PFIZER) 12YRS+ (COMIRNATY) 11/22/2023, 09/16/2024    FLUAD TRI 65YR+ 09/24/2024    Fluad Quad 65+ 11/20/2020    Fluzone (or Fluarix & Flulaval for VFC) >6mos 11/03/2015    Fluzone High-Dose 65+YRS 11/07/2017, 11/08/2018, 12/11/2019    Fluzone High-Dose 65+yrs 10/26/2022, 09/21/2023    Pneumococcal Conjugate 13-Valent (PCV13) 12/11/2019    Pneumococcal Conjugate 20-Valent (PCV20) 09/21/2023    Shingrix 08/27/2024    Tdap 05/15/2023     Last Completed Mammogram    This patient has no relevant Health  Maintenance data.           FAMILY HISTORY:  Family History   Problem Relation Age of Onset    Cancer Mother     Lung cancer Mother         Smoker    Cervical cancer Mother     Heart disease Father     Heart failure Father     Colon cancer Brother     Colon polyps Neg Hx     Esophageal cancer Neg Hx      SOCIAL HISTORY:  Social History     Socioeconomic History    Marital status:    Tobacco Use    Smoking status: Former     Current packs/day: 0.00     Types: Cigarettes     Quit date: 2005     Years since quittin.2     Passive exposure: Past    Smokeless tobacco: Never   Vaping Use    Vaping status: Never Used   Substance and Sexual Activity    Alcohol use: Not Currently     Alcohol/week: 11.0 standard drinks of alcohol     Types: 3 Cans of beer, 8 Shots of liquor per week     Comment: daily 2-3- vodka cranberry- nothing since 10/22/24    Drug use: Never    Sexual activity: Not Currently     Partners: Female       REVIEW OF SYSTEMS:  Review of Systems   Constitutional:  Negative for activity change, appetite change, fatigue, fever, unexpected weight gain and unexpected weight loss.   HENT:  Negative for dental problem, facial swelling, swollen glands and trouble swallowing.    Eyes:  Negative for double vision and discharge.   Respiratory:  Positive for wheezing. Negative for cough and shortness of breath.    Cardiovascular:  Negative for chest pain, palpitations and leg swelling.   Gastrointestinal:  Negative for abdominal pain, blood in stool, nausea and vomiting.   Endocrine: Negative.    Genitourinary:  Negative for dysuria and hematuria.   Musculoskeletal:  Negative for arthralgias and myalgias.   Skin:  Negative for rash, skin lesions and wound.   Allergic/Immunologic: Negative for immunocompromised state.   Neurological:  Negative for speech difficulty, light-headedness, headache, memory problem and confusion.   Hematological:  Negative for adenopathy.   Psychiatric/Behavioral:  Negative for  "self-injury, suicidal ideas and depressed mood. The patient is not nervous/anxious.        /78   Pulse 70   Temp 97.6 °F (36.4 °C)   Resp 16   Ht 170.2 cm (67\")   Wt 94.3 kg (208 lb)   SpO2 95%   BMI 32.58 kg/m²  Body surface area is 2.06 meters squared.    Pain Score    03/18/25 1044   PainSc: 0-No pain        Physical Exam  Constitutional:       Appearance: Normal appearance.   HENT:      Head: Normocephalic and atraumatic.   Cardiovascular:      Rate and Rhythm: Normal rate and regular rhythm.   Pulmonary:      Effort: Pulmonary effort is normal.      Breath sounds: Normal breath sounds.   Abdominal:      General: Bowel sounds are normal.      Palpations: Abdomen is soft.   Skin:     General: Skin is warm and dry.   Neurological:      Mental Status: He is alert and oriented to person, place, and time.   Psychiatric:         Attention and Perception: Attention normal.         Mood and Affect: Mood normal.         Judgment: Judgment normal.       I have reviewed the PHYSICAL EXAM and the accuracy of it. No changes since the information was documented.  Adia Harris, APRN 03/18/2025    Kristian Mir reports a pain score of 0.  Given his pain assessment as noted, treatment options were discussed and the following options were decided upon as a follow-up plan to address the patient's pain:  NO intervention indicated.  .      ASSESSMENT / PLAN:  Recent Results (from the past week)   Comprehensive Metabolic Panel    Collection Time: 03/18/25 10:38 AM    Specimen: Blood   Result Value Ref Range    Glucose 98 65 - 99 mg/dL    BUN 13 8 - 23 mg/dL    Creatinine 1.05 0.76 - 1.27 mg/dL    Sodium 139 136 - 145 mmol/L    Potassium 3.9 3.5 - 5.2 mmol/L    Chloride 101 98 - 107 mmol/L    CO2 25.0 22.0 - 29.0 mmol/L    Calcium 9.1 8.6 - 10.5 mg/dL    Total Protein 6.0 6.0 - 8.5 g/dL    Albumin 3.9 3.5 - 5.2 g/dL    ALT (SGPT) 18 1 - 41 U/L    AST (SGOT) 36 1 - 40 U/L    Alkaline Phosphatase 107 39 - 117 U/L    " Total Bilirubin 0.8 0.0 - 1.2 mg/dL    Globulin 2.1 gm/dL    A/G Ratio 1.9 g/dL    BUN/Creatinine Ratio 12.4 7.0 - 25.0    Anion Gap 13.0 5.0 - 15.0 mmol/L    eGFR 72.7 >60.0 mL/min/1.73   Iron and TIBC    Collection Time: 03/18/25 10:38 AM    Specimen: Blood   Result Value Ref Range    Iron 64 59 - 158 mcg/dL    Iron Saturation (TSAT) 21 20 - 50 %    Transferrin 206 200 - 360 mg/dL    TIBC 307 298 - 536 mcg/dL   Ferritin    Collection Time: 03/18/25 10:38 AM    Specimen: Blood   Result Value Ref Range    Ferritin 150.40 30.00 - 400.00 ng/mL   CBC Auto Differential    Collection Time: 03/18/25 10:38 AM    Specimen: Blood   Result Value Ref Range    WBC 5.63 3.40 - 10.80 10*3/mm3    RBC 3.43 (L) 4.14 - 5.80 10*6/mm3    Hemoglobin 11.4 (L) 13.0 - 17.7 g/dL    Hematocrit 34.6 (L) 37.5 - 51.0 %    .9 (H) 79.0 - 97.0 fL    MCH 33.2 (H) 26.6 - 33.0 pg    MCHC 32.9 31.5 - 35.7 g/dL    RDW 19.5 (H) 12.3 - 15.4 %    RDW-SD 71.3 (H) 37.0 - 54.0 fl    MPV 9.9 6.0 - 12.0 fL    Platelets 183 140 - 450 10*3/mm3    Neutrophil % 56.0 42.7 - 76.0 %    Lymphocyte % 31.8 19.6 - 45.3 %    Monocyte % 6.9 5.0 - 12.0 %    Eosinophil % 4.4 0.3 - 6.2 %    Basophil % 0.4 0.0 - 1.5 %    Immature Grans % 0.5 0.0 - 0.5 %    Neutrophils, Absolute 3.15 1.70 - 7.00 10*3/mm3    Lymphocytes, Absolute 1.79 0.70 - 3.10 10*3/mm3    Monocytes, Absolute 0.39 0.10 - 0.90 10*3/mm3    Eosinophils, Absolute 0.25 0.00 - 0.40 10*3/mm3    Basophils, Absolute 0.02 0.00 - 0.20 10*3/mm3    Immature Grans, Absolute 0.03 0.00 - 0.05 10*3/mm3    nRBC 0.0 0.0 - 0.2 /100 WBC       Assessment:      1. Marginal zone B-cell lymphoma    2. Iron deficiency          Assessment & Plan    1.  Lymphoma    Bone marrow 12/20/2024:  Flow cytometry:   Clonal B-cell population detected (3% of analyzed cells), CD5 negative, CD10 negative, with kappa light chain restriction.  Myeloid blasts are not relatively increased but show partial aberrant CD7 expression.  Morphology:  "  Findings compatible with a low-level marrow involvement by a small mature B-cell lymphoma with nonspecific immunophenotype, interstitial & sinusoidal infiltrate of B cells accounting for approximately 15% of marrow nucleated cells.  Hypercellular marrow for age (~50-60%) with trilineage hematopoiesis, mildly increased megakaryocytes and no increased blast.  No storage iron detected.  Cytogenics: Normal male karyotype with a loss of Y chromosome 45,X,-Y [14]/46, XY [6]    Saw Dr. 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 He has low level involvement in the bone marrow and splenomegaly, but no aleyda disease.  I had a long discussion with the patient and his wife today about his diagnosis, treatments. We discussed that marginal zone lymphoma is a low-grade lymphoma that can be indolent for many years even without requiring treatment. We discussed some of the indications for treatment including B symptoms, symptomatic splenomegaly, and cytopenias. We discussed that he should have a good overall prognosis from this. We did discuss some of the possible treatment options and I would recommend single agent Rituxan as first line treatment if needed. He is completely asymptomatic from this, and thus, I would recommend observation for now.\"    -Labs today:  Hgb 11.4, Hct 34.6   -Pt remains asymptomatic.    -Scheduled to follow up at [facility] in June.      2.  Iron Deficiency   -Taking oral iron daily   Labs today:   Hgb 11.4, Hct 34.6, Iron 64, Ferritin 150, Sat 21%, TIBC 307  -Stable for observation         Follow-up  - Follow up in 6 months with labs one week before office visit  -Pt will make us aware if he starts to have any symptoms.             ACP discussion was held with the patient during this visit. Patient has an advance directive in EMR which is still valid.          Adia Harris, APRN  03/18/2025     Patient or " patient representative verbalized consent for the use of Ambient Listening during the visit with  JONG Estrella for chart documentation.

## 2025-03-19 LAB
ALBUMIN SERPL-MCNC: 4 G/DL (ref 3.5–5.2)
ALBUMIN/GLOB SERPL: 2.2 G/DL
ALP SERPL-CCNC: 115 U/L (ref 39–117)
ALT SERPL-CCNC: 18 U/L (ref 1–41)
APPEARANCE UR: CLEAR
AST SERPL-CCNC: 41 U/L (ref 1–40)
BACTERIA #/AREA URNS HPF: NORMAL /HPF
BASOPHILS # BLD AUTO: 0.03 10*3/MM3 (ref 0–0.2)
BASOPHILS NFR BLD AUTO: 0.6 % (ref 0–1.5)
BILIRUB SERPL-MCNC: 0.7 MG/DL (ref 0–1.2)
BILIRUB UR QL STRIP: NEGATIVE
BUN SERPL-MCNC: 14 MG/DL (ref 8–23)
BUN/CREAT SERPL: 12.4 (ref 7–25)
CALCIUM SERPL-MCNC: 9.4 MG/DL (ref 8.6–10.5)
CASTS URNS MICRO: NORMAL
CHLORIDE SERPL-SCNC: 102 MMOL/L (ref 98–107)
CHOLEST SERPL-MCNC: 87 MG/DL (ref 0–200)
CO2 SERPL-SCNC: 26.2 MMOL/L (ref 22–29)
COLOR UR: YELLOW
CREAT SERPL-MCNC: 1.13 MG/DL (ref 0.76–1.27)
EGFRCR SERPLBLD CKD-EPI 2021: 66.5 ML/MIN/1.73
EOSINOPHIL # BLD AUTO: 0.21 10*3/MM3 (ref 0–0.4)
EOSINOPHIL NFR BLD AUTO: 3.9 % (ref 0.3–6.2)
EPI CELLS #/AREA URNS HPF: NORMAL /HPF
ERYTHROCYTE [DISTWIDTH] IN BLOOD BY AUTOMATED COUNT: 17.8 % (ref 12.3–15.4)
GLOBULIN SER CALC-MCNC: 1.8 GM/DL
GLUCOSE SERPL-MCNC: 90 MG/DL (ref 65–99)
GLUCOSE UR QL STRIP: ABNORMAL
HCT VFR BLD AUTO: 35 % (ref 37.5–51)
HDLC SERPL-MCNC: 36 MG/DL (ref 40–60)
HGB BLD-MCNC: 11.2 G/DL (ref 13–17.7)
HGB UR QL STRIP: NEGATIVE
IMM GRANULOCYTES # BLD AUTO: 0.03 10*3/MM3 (ref 0–0.05)
IMM GRANULOCYTES NFR BLD AUTO: 0.6 % (ref 0–0.5)
KETONES UR QL STRIP: NEGATIVE
LDLC SERPL CALC-MCNC: 38 MG/DL (ref 0–100)
LEUKOCYTE ESTERASE UR QL STRIP: NEGATIVE
LYMPHOCYTES # BLD AUTO: 1.76 10*3/MM3 (ref 0.7–3.1)
LYMPHOCYTES NFR BLD AUTO: 32.5 % (ref 19.6–45.3)
MAGNESIUM SERPL-MCNC: 1.6 MG/DL (ref 1.6–2.4)
MCH RBC QN AUTO: 32.7 PG (ref 26.6–33)
MCHC RBC AUTO-ENTMCNC: 32 G/DL (ref 31.5–35.7)
MCV RBC AUTO: 102 FL (ref 79–97)
MONOCYTES # BLD AUTO: 0.36 10*3/MM3 (ref 0.1–0.9)
MONOCYTES NFR BLD AUTO: 6.7 % (ref 5–12)
NEUTROPHILS # BLD AUTO: 3.02 10*3/MM3 (ref 1.7–7)
NEUTROPHILS NFR BLD AUTO: 55.7 % (ref 42.7–76)
NITRITE UR QL STRIP: NEGATIVE
NRBC BLD AUTO-RTO: 0 /100 WBC (ref 0–0.2)
PH UR STRIP: 6.5 [PH] (ref 5–8)
PLATELET # BLD AUTO: 206 10*3/MM3 (ref 140–450)
POTASSIUM SERPL-SCNC: 4.3 MMOL/L (ref 3.5–5.2)
PROT SERPL-MCNC: 5.8 G/DL (ref 6–8.5)
PROT UR QL STRIP: NEGATIVE
RBC # BLD AUTO: 3.43 10*6/MM3 (ref 4.14–5.8)
RBC #/AREA URNS HPF: NORMAL /HPF
SODIUM SERPL-SCNC: 141 MMOL/L (ref 136–145)
SP GR UR STRIP: 1.02 (ref 1–1.03)
T4 FREE SERPL-MCNC: 0.79 NG/DL (ref 0.93–1.7)
TRIGL SERPL-MCNC: 55 MG/DL (ref 0–150)
TSH SERPL DL<=0.005 MIU/L-ACNC: 6.75 UIU/ML (ref 0.27–4.2)
UROBILINOGEN UR STRIP-MCNC: ABNORMAL MG/DL
VLDLC SERPL CALC-MCNC: 13 MG/DL (ref 5–40)
WBC # BLD AUTO: 5.41 10*3/MM3 (ref 3.4–10.8)
WBC #/AREA URNS HPF: NORMAL /HPF

## 2025-03-24 ENCOUNTER — OFFICE VISIT (OUTPATIENT)
Dept: INTERNAL MEDICINE | Facility: CLINIC | Age: 80
End: 2025-03-24
Payer: MEDICARE

## 2025-03-24 VITALS
TEMPERATURE: 97.4 F | BODY MASS INDEX: 32.49 KG/M2 | SYSTOLIC BLOOD PRESSURE: 122 MMHG | DIASTOLIC BLOOD PRESSURE: 68 MMHG | WEIGHT: 207 LBS | HEIGHT: 67 IN | OXYGEN SATURATION: 94 % | HEART RATE: 72 BPM

## 2025-03-24 DIAGNOSIS — F10.10 ALCOHOL ABUSE: ICD-10-CM

## 2025-03-24 DIAGNOSIS — I50.32 CHRONIC DIASTOLIC (CONGESTIVE) HEART FAILURE: ICD-10-CM

## 2025-03-24 DIAGNOSIS — E03.9 HYPOTHYROIDISM, UNSPECIFIED TYPE: ICD-10-CM

## 2025-03-24 DIAGNOSIS — E66.09 CLASS 1 OBESITY DUE TO EXCESS CALORIES WITH SERIOUS COMORBIDITY AND BODY MASS INDEX (BMI) OF 32.0 TO 32.9 IN ADULT: ICD-10-CM

## 2025-03-24 DIAGNOSIS — E66.811 CLASS 1 OBESITY DUE TO EXCESS CALORIES WITH SERIOUS COMORBIDITY AND BODY MASS INDEX (BMI) OF 32.0 TO 32.9 IN ADULT: ICD-10-CM

## 2025-03-24 DIAGNOSIS — I50.22 CHRONIC SYSTOLIC HEART FAILURE: ICD-10-CM

## 2025-03-24 DIAGNOSIS — E03.9 ACQUIRED HYPOTHYROIDISM: ICD-10-CM

## 2025-03-24 DIAGNOSIS — E61.1 IRON DEFICIENCY: ICD-10-CM

## 2025-03-24 DIAGNOSIS — I10 PRIMARY HYPERTENSION: ICD-10-CM

## 2025-03-24 DIAGNOSIS — S49.91XD INJURY OF RIGHT SHOULDER, SUBSEQUENT ENCOUNTER: Primary | ICD-10-CM

## 2025-03-24 PROCEDURE — G0439 PPPS, SUBSEQ VISIT: HCPCS | Performed by: INTERNAL MEDICINE

## 2025-03-24 PROCEDURE — 1126F AMNT PAIN NOTED NONE PRSNT: CPT | Performed by: INTERNAL MEDICINE

## 2025-03-24 PROCEDURE — 3074F SYST BP LT 130 MM HG: CPT | Performed by: INTERNAL MEDICINE

## 2025-03-24 PROCEDURE — 3078F DIAST BP <80 MM HG: CPT | Performed by: INTERNAL MEDICINE

## 2025-03-24 PROCEDURE — 99214 OFFICE O/P EST MOD 30 MIN: CPT | Performed by: INTERNAL MEDICINE

## 2025-03-24 PROCEDURE — 1170F FXNL STATUS ASSESSED: CPT | Performed by: INTERNAL MEDICINE

## 2025-03-24 RX ORDER — LEVOTHYROXINE SODIUM 25 UG/1
25 TABLET ORAL
Qty: 90 TABLET | Refills: 2 | Status: SHIPPED | OUTPATIENT
Start: 2025-03-24 | End: 2025-03-25 | Stop reason: SDUPTHER

## 2025-03-24 NOTE — PROGRESS NOTES
Subjective   The ABCs of the Annual Wellness Visit  Medicare Wellness Visit      Kristian Mir is a 79 y.o. patient who presents for a Medicare Wellness Visit.    The following portions of the patient's history were reviewed and   updated as appropriate: allergies, current medications, past family history, past medical history, past social history, past surgical history, and problem list.    Compared to one year ago, the patient's physical   health is better.  Compared to one year ago, the patient's mental   health is the same.    Recent Hospitalizations:  This patient has had a [facility] admission record on file within the last 365 days.  Current Medical Providers:  Patient Care Team:  Norberto Candelario MD as PCP - General (Internal Medicine)  Daniel Clayton MD as Cardiologist (Cardiology)  Fly Seymour DO as Consulting Physician (Gastroenterology)    Outpatient Medications Prior to Visit   Medication Sig Dispense Refill    albuterol sulfate  (90 Base) MCG/ACT inhaler Inhale 2 puffs Every 4 (Four) Hours As Needed for Wheezing.      allopurinol (ZYLOPRIM) 300 MG tablet Take 1 tablet by mouth Every Morning.      aspirin 81 MG EC tablet Take 1 tablet by mouth Daily.      atorvastatin (LIPITOR) 40 MG tablet Take 0.5 tablets by mouth Every Night.      empagliflozin (JARDIANCE) 25 MG tablet tablet Take 0.5 tablets by mouth Daily.      ferrous sulfate 325 (65 FE) MG tablet Take 1 tablet by mouth Daily With Breakfast.      furosemide (LASIX) 20 MG tablet Take 1 tablet by mouth Every Morning.      metoprolol succinate XL (TOPROL-XL) 100 MG 24 hr tablet Take 0.5 tablets by mouth Every Morning.      pantoprazole (PROTONIX) 40 MG EC tablet Take 1 tablet by mouth Every Morning.      potassium chloride (KLOR-CON M20) 20 MEQ CR tablet Take 1 tablet by mouth Daily. 10 tablet 0    spironolactone (ALDACTONE) 25 MG tablet Take 1 tablet by mouth Every Morning.      tamsulosin (FLOMAX) 0.4 MG capsule  24 hr capsule Take 1 capsule by mouth Every Evening.       No facility-administered medications prior to visit.     No opioid medication identified on active medication list. I have reviewed chart for other potential  high risk medication/s and harmful drug interactions in the elderly.      Aspirin is on active medication list. Aspirin use is indicated based on review of current medical condition/s. Pros and cons of this therapy have been discussed today. Benefits of this medication outweigh potential harm.  Patient has been encouraged to continue taking this medication.  .      Patient Active Problem List   Diagnosis    Permanent atrial fibrillation    Hypertension    Non-ischemic cardiomyopathy    Encounter for screening for malignant neoplasm of colon    Macrocytic anemia    Primary osteoarthritis of left shoulder    H/O gastric ulcer    Alcohol use    Gastritis and duodenitis, chronic    Skin abrasion    Skin tear of right elbow without complication    Venous insufficiency    Iron deficiency anemia    Chronic blood loss anemia    Chronic diastolic (congestive) heart failure    Chronic systolic heart failure    Presence of Watchman left atrial appendage closure device    Acute alcoholic hepatitis    Anasarca    Alcohol abuse, in remission    Cellulitis both upper extremeties    Hyperbilirubinemia    Chronic atrial fibrillation    Acquired hypothyroidism    Class 2 severe obesity due to excess calories with serious comorbidity and body mass index (BMI) of 35.0 to 35.9 in adult    Hypokalemia    Iron deficiency    Iron malabsorption    Class 1 obesity due to excess calories with serious comorbidity and body mass index (BMI) of 32.0 to 32.9 in adult     Advance Care Planning Advance Directive is on file.  ACP discussion was held with the patient during this visit. Patient has an advance directive in EMR which is still valid.             Objective   Vitals:    03/24/25 0707   BP: 122/68   BP Location: Left arm  "  Patient Position: Sitting   Cuff Size: Adult   Pulse: 72   Temp: 97.4 °F (36.3 °C)   TempSrc: Temporal   SpO2: 94%   Weight: 93.9 kg (207 lb)   Height: 170.2 cm (67.01\")   PainSc: 0-No pain       Estimated body mass index is 32.41 kg/m² as calculated from the following:    Height as of this encounter: 170.2 cm (67.01\").    Weight as of this encounter: 93.9 kg (207 lb).                Does the patient have evidence of cognitive impairment? No  Lab Results   Component Value Date    CHLPL 87 2025    TRIG 55 2025    HDL 36 (L) 2025    LDL 38 2025    VLDL 13 2025                                                                                               Health  Risk Assessment    Smoking Status:  Social History     Tobacco Use   Smoking Status Former    Current packs/day: 0.00    Types: Cigarettes    Quit date: 2005    Years since quittin.2    Passive exposure: Past   Smokeless Tobacco Never     Alcohol Consumption:  Social History     Substance and Sexual Activity   Alcohol Use Not Currently    Alcohol/week: 11.0 standard drinks of alcohol    Types: 3 Cans of beer, 8 Shots of liquor per week    Comment: nothing since 10/22/24       Fall Risk Screen  STEADI Fall Risk Assessment was completed, and patient is at LOW risk for falls.Assessment completed on:3/24/2025    Depression Screening   Little interest or pleasure in doing things? Not at all   Feeling down, depressed, or hopeless? Not at all   PHQ-2 Total Score 0      Health Habits and Functional and Cognitive Screening:      3/24/2025     7:13 AM   Functional & Cognitive Status   Do you have difficulty preparing food and eating? No   Do you have difficulty bathing yourself, getting dressed or grooming yourself? No   Do you have difficulty using the toilet? No   Do you have difficulty moving around from place to place? No   Do you have trouble with steps or getting out of a bed or a chair? No   Current Diet Well Balanced Diet "   Dental Exam Not up to date   Eye Exam Up to date   Exercise (times per week) 0 times per week   Current Exercises Include No Regular Exercise   Do you need help using the phone?  No   Are you deaf or do you have serious difficulty hearing?  No   Do you need help to go to places out of walking distance? No   Do you need help shopping? No   Do you need help preparing meals?  No   Do you need help with housework?  No   Do you need help with laundry? No   Do you need help taking your medications? No   Do you need help managing money? No   Do you ever drive or ride in a car without wearing a seat belt? No   Have you felt unusual stress, anger or loneliness in the last month? No   Who do you live with? Spouse   If you need help, do you have trouble finding someone available to you? No   Have you been bothered in the last four weeks by sexual problems? No   Do you have difficulty concentrating, remembering or making decisions? No           Age-appropriate Screening Schedule:  Refer to the list below for future screening recommendations based on patient's age, sex and/or medical conditions. Orders for these recommended tests are listed in the plan section. The patient has been provided with a written plan.    Health Maintenance List  Health Maintenance   Topic Date Due    COVID-19 Vaccine (9 - Mixed Product risk 2024-25 season) 03/16/2025    BMI FOLLOWUP  09/23/2025    LIPID PANEL  03/18/2026    ANNUAL WELLNESS VISIT  03/24/2026    COLORECTAL CANCER SCREENING  06/30/2028    TDAP/TD VACCINES (2 - Td or Tdap) 05/15/2033    HEPATITIS C SCREENING  Completed    RSV Vaccine - Adults  Completed    INFLUENZA VACCINE  Completed    Pneumococcal Vaccine 50+  Completed    ZOSTER VACCINE  Completed                                                                                                                                                 CMS Preventative Services Quick Reference  Risk Factors Identified During Encounter  Dental  Screening Recommended  Vision Screening Recommended    The above risks/problems have been discussed with the patient.  Pertinent information has been shared with the patient in the After Visit Summary.  An After Visit Summary and PPPS were made available to the patient.    Follow Up:   Next Medicare Wellness visit to be scheduled in 1 year.        Diagnoses and all orders for this visit:    1. Injury of right shoulder, subsequent encounter (Primary)  -     Ambulatory Referral to Sports Medicine    2. Hypothyroidism, unspecified type  -     levothyroxine (SYNTHROID, LEVOTHROID) 25 MCG tablet; Take 1 tablet by mouth Every Morning.  Dispense: 90 tablet; Refill: 2    3. Acquired hypothyroidism  -     TSH Rfx On Abnormal To Free T4; Future    4. Chronic diastolic (congestive) heart failure    5. Chronic systolic heart failure    6. Primary hypertension    7. Iron deficiency    8. Alcohol abuse, in remission    9. Class 1 obesity due to excess calories with serious comorbidity and body mass index (BMI) of 32.0 to 32.9 in adult      Recommend at least annual dental and vision screening.  Recommend annual influenza vaccination  Recommend a varied diet and appropriate portion sizes.   CDC recommendations for physical activity:  At least 150 minutes a week (for example, 30 minutes a day, 5 days a week) of moderate-intensity activity such as brisk walking. Or can consider 75 minutes a week of vigorous-intensity activity such as hiking, jogging, or running.  At least 2 days a week of activities that strengthen muscles.  Plus activities to improve balance.    Health Maintenance Due   Topic Date Due    COVID-19 Vaccine (9 - Mixed Product risk 2024-25 season) 03/16/2025         Result Review :           Follow Up:   Return in about 6 months (around 9/24/2025), or if symptoms worsen or fail to improve, for follow up for above problems. Longitudinal care..     An After Visit Summary and PPPS were made available to the  "patient.                   DELICIA Candelario MD, FACP, FH      Electronically signed by Norberto Candelario MD, 03/24/25, 7:28 AM CDT.    Additional E&M Note during same encounter follows:  Patient has additional, significant, and separately identifiable condition(s)/problem(s) that require work above and beyond the Medicare Wellness Visit       Chief Complaint  Medicare Wellness-subsequent    Subjective        HPI  Kristian Mir is also being seen today for hypothyroidism, right shoulder pain  Patient here for the above problems.  See Assessment and Plan for further HPI components.        Objective   Vitals:    03/24/25 0707   BP: 122/68   BP Location: Left arm   Patient Position: Sitting   Cuff Size: Adult   Pulse: 72   Temp: 97.4 °F (36.3 °C)   TempSrc: Temporal   SpO2: 94%   Weight: 93.9 kg (207 lb)   Height: 170.2 cm (67.01\")   PainSc: 0-No pain     Physical Exam  Vitals and nursing note reviewed.   Constitutional:       Appearance: He is not ill-appearing.   Eyes:      General: No scleral icterus.     Conjunctiva/sclera: Conjunctivae normal.   Pulmonary:      Effort: Pulmonary effort is normal. No respiratory distress.   Musculoskeletal:      Right shoulder: Decreased range of motion.      Left shoulder: Normal range of motion.   Neurological:      General: No focal deficit present.      Mental Status: He is alert and oriented to person, place, and time.   Psychiatric:         Mood and Affect: Mood normal.         Behavior: Behavior normal.              The following data was reviewed by: Norberto Candelario MD on 03/24/2025:  CMP          1/9/2025    08:30 1/21/2025    11:58 3/18/2025    08:59 3/18/2025    10:38   CMP   Glucose 98   90  98    BUN 8   14  13    Creatinine 0.82   1.13  1.05    EGFR 89.9   66.5  72.7    Sodium 137   141  139    Potassium 3.5   4.3  3.9    Chloride 101   102  101    Calcium 8.6   9.4  9.1    Total Protein 5.6  5.4     5.8  6.0    Albumin 3.5   4.0  3.9    Globulin 2.1   1.8  2.1 "    Total Bilirubin 1.0   0.7  0.8    Alkaline Phosphatase 104   115  107    AST (SGOT) 26   41  36    ALT (SGPT) 11   18  18    Albumin/Globulin Ratio 1.7   2.2  1.9    BUN/Creatinine Ratio 9.8   12.4  12.4    Anion Gap 10.0    13.0       Details          This result is from an external source.             CBC w/diff          12/20/2024    08:25 1/9/2025    08:30 3/18/2025    08:59 3/18/2025    10:38   CBC w/Diff   WBC 5.41  7.93  5.41  5.63    RBC 3.46  3.08  3.43  3.43    Hemoglobin 12.1  10.7  11.2  11.4    Hematocrit 38.3  32.6  35.0  34.6    .7  105.8  102.0  100.9    MCH 35.0  34.7  32.7  33.2    MCHC 31.6  32.8  32.0  32.9    RDW 15.3  16.0  17.8  19.5    Platelets 185  206  206  183    Neutrophil Rel % 57.3   55.7  56.0    Immature Granulocyte Rel % 0.6    0.5    Lymphocyte Rel % 32.3   32.5  31.8    Monocyte Rel % 7.0   6.7  6.9    Eosinophil Rel % 2.2   3.9  4.4    Basophil Rel % 0.6   0.6  0.4      Lipid Panel          12/17/2024    08:02 3/18/2025    08:59   Lipid Panel   Total Cholesterol 110  87    Triglycerides 74  55    HDL Cholesterol 53  36    VLDL Cholesterol 15  13    LDL Cholesterol  42  38      TSH          10/23/2024    19:17 12/17/2024    08:02 3/18/2025    08:59   TSH   TSH 5.740  4.700  6.750          UA          3/18/2025    08:59   Urinalysis   Blood, UA Negative    Leukocytes, UA Negative    Nitrite, UA Negative    RBC, UA 0-2    Bacteria, UA Comment               Assessment and Plan   Diagnoses and all orders for this visit:    1. Injury of right shoulder, subsequent encounter (Primary)  -     Ambulatory Referral to Sports Medicine    2. Hypothyroidism, unspecified type  -     levothyroxine (SYNTHROID, LEVOTHROID) 25 MCG tablet; Take 1 tablet by mouth Every Morning.  Dispense: 90 tablet; Refill: 2    3. Acquired hypothyroidism  -     TSH Rfx On Abnormal To Free T4; Future    4. Chronic diastolic (congestive) heart failure    5. Chronic systolic heart failure    6. Primary  hypertension    7. Iron deficiency    8. Alcohol abuse, in remission    9. Class 1 obesity due to excess calories with serious comorbidity and body mass index (BMI) of 32.0 to 32.9 in adult      Patient has had worsening TSH and now FT4 confirming hypothyroidism.  Patient was subclinical hypothyroid before.  Will start low dose levothyroxine 25 mcg.  Recheck TSH when he has labs with Adia Harris.  Normal I would check in 6-8 weeks, but given that we are starting the lowest dose, not too worried about overshooting, I think waiting 5 months to recheck is fine.  I have placed this order at [facility].    Patient still not drinking alcohol. LFTs have improved.    Patient seeing cancer doctor at [facility] soon.    Patient still has anemia but stable.    Patient has right rotator cuff injury has been present for a while.  He has no interest in surgery.  He had mentioned seeing sports medicine.  I recommended Dr. Mcneal, he is okay with that referral.  He is interested in injection and other nonsurgical interventions.        Patient has a BMI > 30.  Obesity increases risks of diseases such as diabetes, coronary artery disease, hypertension, sleep apnea, hyperlipidemia, arthritis, and stroke.  Recommend focusing on portion control and making healthy diet choices.  Avoid fast food.  Avoid calorie beverages including sweet tea, juice, soft drinks, and alcohol.  Recommend increasing your activity and starting a regular exercise program. Can consider utilizing calorie counting apps such as [employer].               Follow Up   Return in about 6 months (around 9/24/2025), or if symptoms worsen or fail to improve, for follow up for above problems. Longitudinal care..  Patient was given instructions and counseling regarding his condition or for health maintenance advice. Please see specific information pulled into the AVS if appropriate.

## 2025-03-25 DIAGNOSIS — E03.9 HYPOTHYROIDISM, UNSPECIFIED TYPE: ICD-10-CM

## 2025-03-25 RX ORDER — LEVOTHYROXINE SODIUM 25 UG/1
25 TABLET ORAL
Qty: 90 TABLET | Refills: 2 | Status: SHIPPED | OUTPATIENT
Start: 2025-03-25

## 2025-03-25 NOTE — TELEPHONE ENCOUNTER
"S[facility] (\"no [facility] approval to receive care on file\")  "

## 2025-03-26 ENCOUNTER — TELEPHONE (OUTPATIENT)
Age: 80
End: 2025-03-26
Payer: MEDICARE

## 2025-03-26 DIAGNOSIS — M25.511 RIGHT SHOULDER PAIN, UNSPECIFIED CHRONICITY: Primary | ICD-10-CM

## 2025-03-26 NOTE — TELEPHONE ENCOUNTER
Patient informed of X-ray order by Dr. Mcneal at [facility]. Patient to arrive 30 minutes before appointment at [address] outpatient lab and imaging.

## 2025-03-27 ENCOUNTER — HOSPITAL ENCOUNTER (OUTPATIENT)
Dept: GENERAL RADIOLOGY | Facility: HOSPITAL | Age: 80
Discharge: HOME | End: 2025-03-27
Admitting: STUDENT IN AN ORGANIZED HEALTH CARE EDUCATION/TRAINING PROGRAM
Payer: MEDICARE

## 2025-03-27 ENCOUNTER — OFFICE VISIT (OUTPATIENT)
Age: 80
End: 2025-03-27
Payer: MEDICARE

## 2025-03-27 DIAGNOSIS — M75.101 TEAR OF RIGHT ROTATOR CUFF, UNSPECIFIED TEAR EXTENT, UNSPECIFIED WHETHER TRAUMATIC: Primary | ICD-10-CM

## 2025-03-27 DIAGNOSIS — M25.511 RIGHT SHOULDER PAIN, UNSPECIFIED CHRONICITY: ICD-10-CM

## 2025-03-27 PROCEDURE — 1159F MED LIST DOCD IN RCRD: CPT | Performed by: STUDENT IN AN ORGANIZED HEALTH CARE EDUCATION/TRAINING PROGRAM

## 2025-03-27 PROCEDURE — 73030 X-RAY EXAM OF SHOULDER: CPT

## 2025-03-27 PROCEDURE — 99204 OFFICE O/P NEW MOD 45 MIN: CPT | Performed by: STUDENT IN AN ORGANIZED HEALTH CARE EDUCATION/TRAINING PROGRAM

## 2025-03-27 PROCEDURE — 1160F RVW MEDS BY RX/DR IN RCRD: CPT | Performed by: STUDENT IN AN ORGANIZED HEALTH CARE EDUCATION/TRAINING PROGRAM

## 2025-03-27 PROCEDURE — 20610 DRAIN/INJ JOINT/BURSA W/O US: CPT | Performed by: STUDENT IN AN ORGANIZED HEALTH CARE EDUCATION/TRAINING PROGRAM

## 2025-03-27 RX ORDER — LIDOCAINE HYDROCHLORIDE 10 MG/ML
2 INJECTION, SOLUTION INFILTRATION; PERINEURAL ONCE
Status: COMPLETED | OUTPATIENT
Start: 2025-03-27 | End: 2025-03-27

## 2025-03-27 RX ORDER — TRIAMCINOLONE ACETONIDE 40 MG/ML
40 INJECTION, SUSPENSION INTRA-ARTICULAR; INTRAMUSCULAR ONCE
Status: COMPLETED | OUTPATIENT
Start: 2025-03-27 | End: 2025-03-27

## 2025-03-27 RX ADMIN — TRIAMCINOLONE ACETONIDE 40 MG: 40 INJECTION, SUSPENSION INTRA-ARTICULAR; INTRAMUSCULAR at 09:24

## 2025-03-27 RX ADMIN — LIDOCAINE HYDROCHLORIDE 2 ML: 10 INJECTION, SOLUTION INFILTRATION; PERINEURAL at 09:24

## 2025-03-27 NOTE — PROGRESS NOTES
[facility] Orthopedics & Sports Medicine  Bossman Mcneal MD, PhD  Wu Mcneal PA-C    CHIEF COMPLAINT  Initial Evaluation of the Right Shoulder (Patient presents today for right shoulder pain. X-rays performed at [facility] on 03/27/2025. Patient states pain started about 5 months ago. No known injury. Pain complains of pain in shoulder and radiating down to elbow. No surgical history. No physical therapy.)       HISTORY OF PRESENT ILLNESS  Patient saw PCP Dr. Candelario on 3/24/2025 and was noted to have a right rotator cuff injury that had been present for a while.  Patient has no interest in surgery.  History of Present Illness  The patient is a 79-year-old male who presents as a new patient with right shoulder pain.    He has been experiencing discomfort in his right shoulder for approximately 5 months, which he attributes to an incident where he heard a popping sound while lifting the covers in bed. He reports no associated bruising. His range of motion is limited, with abduction and forward flexion restricted to about 45 degrees. He experiences intermittent pain but does not report any tenderness upon palpation. He has not sought any medical intervention, including medication, injections, or physical therapy, for this issue over the past 5 months. He expresses a preference for non-surgical treatment options and a general aversion to medication, particularly pain relievers.    Supplemental Information  He takes gabapentin for scoliosis of the spine. He had hip surgery by Dr. Grimes and did not do much physical therapy, just walked around the house.       HISTORY    Current Outpatient Medications   Medication Instructions    albuterol sulfate  (90 Base) MCG/ACT inhaler 2 puffs, Every 4 Hours PRN    allopurinol (ZYLOPRIM) 300 mg, Every Morning    aspirin 81 mg, Oral, Daily    atorvastatin (LIPITOR) 20 mg, Nightly    empagliflozin (JARDIANCE) 12.5 mg, Daily    ferrous sulfate 325 mg, Daily With  Breakfast    furosemide (LASIX) 20 mg, Every Morning    levothyroxine (SYNTHROID, LEVOTHROID) 25 mcg, Oral, Every Early Morning    metoprolol succinate XL (TOPROL-XL) 50 mg, Every Morning    pantoprazole (PROTONIX) 40 mg, Every Morning    potassium chloride (KLOR-CON M20) 20 MEQ CR tablet 20 mEq, Oral, Daily    spironolactone (ALDACTONE) 25 mg, Every Morning    tamsulosin (FLOMAX) 0.4 MG capsule 24 hr capsule 1 capsule, Every Evening         reports that he quit smoking about 20 years ago. His smoking use included cigarettes. He has been exposed to tobacco smoke. He has never used smokeless tobacco. He reports that he does not currently use alcohol after a past usage of about 11.0 standard drinks of alcohol per week. He reports that he does not use drugs.    Past Medical History:   Diagnosis Date    Arrhythmia     Arthritis     Asthma     Atrial fibrillation     Cancer     skin    Chronic anticoagulation 5/18/2023    Clotting disorder     COPD (chronic obstructive pulmonary disease)     GERD (gastroesophageal reflux disease)     History of transfusion     1992 bleeding ulcers dr gonzalez    Hyperlipidemia     Hypertension     Non-ischemic cardiomyopathy 10/14/2021        Past Surgical History:   Procedure Laterality Date    ATRIAL APPENDAGE EXCLUSION LEFT WITH TRANSESOPHAGEAL ECHOCARDIOGRAM Right 09/12/2023    Procedure: Atrial Appendage Occlusion;  Surgeon: Daniel Clayton MD;  Location: [facility] CATH INVASIVE LOCATION;  Service: Cardiology;  Laterality: Right;    CARDIAC CATHETERIZATION      CARDIOVERSION      10/21 and 11/21 dr dawson richardson    CATARACT EXTRACTION Bilateral     laser juanita richardson    COLONOSCOPY N/A 04/15/2022    Procedure: COLONOSCOPY WITH ANESTHESIA;  Surgeon: Fly Seymour DO;  Location: [facility] ENDOSCOPY;  Service: Gastroenterology;  Laterality: N/A;  pre anemia, screen  post polyps, diverticulosis  Norberto Hannah MD    COLONOSCOPY N/A 06/30/2023    Procedure: COLONOSCOPY  WITH ANESTHESIA;  Surgeon: Fly Seymour DO;  Location: [facility] ENDOSCOPY;  Service: Gastroenterology;  Laterality: N/A;  Pre: Anemia;  Post: Diverticulosis;  Norberto Candelario MD    ENDOSCOPY N/A 05/09/2023    Procedure: ESOPHAGOGASTRODUODENOSCOPY WITH ANESTHESIA;  Surgeon: Fly Seymour DO;  Location: [facility] ENDOSCOPY;  Service: Gastroenterology;  Laterality: N/A;  Pre: Anemia  Post: Duodenitis, Gastritis  Norberto Candelario MD    JOINT REPLACEMENT Bilateral     hip    SKIN CANCER EXCISION Right 10/2023    TOTAL SHOULDER ARTHROPLASTY W/ DISTAL CLAVICLE EXCISION Left 01/03/2023    Procedure: LEFT REVERSE TOTAL SHOULDER ARTHROPLASTY;  Surgeon: Callum Hernandez MD;  Location: [facility] OR;  Service: Orthopedics;  Laterality: Left;        PHYSICAL EXAM  Constitutional: The patient is in no apparent distress and generally well-appearing. The patient hears me clearly and answers questions appropriately.   Musculoskeletal:  SHOULDER: Right  Inspection: No obvious deformities. No erythema, edema, ecchymosis, or signs of infection. No significant muscle atrophy.  No crepitus.    Tender to palpation:  None  Non-tender:  clavicle, scapula, AC joint, biceps tendon, posterior acromial, deltoid muscle    AROM:  Forward flexion: Reduced to about 45 degrees  Abduction: Significantly reduced to about 45 degrees  Internal rotation: normal    Strength:  Abduction w/ arm at 90deg 4/5  External rotation w/ arm at side 4/5  Internal rotation w/ arm at side 5/5    Impingement:  -Neer positive  -Hawkin positive  -Painful arc positive  Supraspinatus:  -Empty can/Maia's positive  -Drop arm test positive  Biceps:  Speed negative  Labrum:  [place] negative    Physical Exam  Right shoulder abduction and forward flexion is limited to about 45 degrees. No tenderness upon palpation.      IMAGING    XR Shoulder 2+ View Right  Result Date: 3/27/2025  Narrative: EXAMINATION:  XR SHOULDER 2+ VW RIGHT-  3/27/2025 7:39 AM  HISTORY:  M25.511-Pain in right shoulder.  COMPARISON: No comparison study.  TECHNIQUE: 4 views were obtained. Gleamer AI fracture analysis was utilized.  FINDINGS: There is no fracture. The joint spaces are fairly well-preserved. On the image with external rotation, there is somewhat limited space between the acromion and humeral head. There is mild roughening of the surface of the greater tuberosity. The visualized right lung is clear. The visualized right ribs are intact.       Impression: 1. No evidence of acute fracture. 2. Joint spaces are fairly well-preserved. 3. Limited space between the acromion and humeral head on the AP image with external rotation raising the possibility of rotator cuff disease. There is also roughening of the surface of the greater tuberosity which may also be seen in patients with rotator cuff disease.   This report was signed and finalized on 3/27/2025 11:30 AM by Dr. Miguel Angel Castillo MD.      X-rays above personally reviewed and interpreted.   Subtle degenerative changes at the greater tuberosity.  Decreased acromiohumeral interval.  No fractures.    Results         ASSESSMENT & PLAN  Diagnoses and all orders for this visit:    1. Tear of right rotator cuff, unspecified tear extent, unspecified whether traumatic (Primary)  -     lidocaine (XYLOCAINE) 1 % injection 2 mL  -     triamcinolone acetonide (KENALOG-40) injection 40 mg         Assessment & Plan  1. Right shoulder pain.  The clinical presentation suggests a probable rotator cuff tear, although definitive diagnosis would require an MRI. However, given the patient's preference to avoid surgical intervention, an MRI is deemed unnecessary at this point. A steroid injection will be administered today to alleviate inflammation around the tendons and bursa. The potential benefits and risks of the injection were discussed, and he agreed to proceed.  I will see him back in a few weeks to see how he responds to the injection we did discuss  "starting physical therapy at that time if needed.    Right shoulder injection  Home PT exercises  Follow up: 3 weeks    Right shoulder injection was discussed with the patient in detail, including indication, risks, benefits, and alternatives. Risks include but are not limited to: incomplete symptom resolution, injection site pain, local irritation, bleeding, infection, allergic reaction, elevated blood pressure and blood sugar. Verbal consent was given for the procedure.  Injection site was identified by physical examination and cleaned with Chloraprep and alcohol swabs. Prior to needle insertion, ethyl chloride spray was used for surface anesthesia.  A 22-gauge, 1.5\" needle was directed to the joint from a POSTERIOR SUBACROMIAL approach. Injectate was passed into the shoulder without difficulty. The needle was removed and a simple bandage was applied. The procedure was tolerated well without difficulty.    Injection mixture:  1% plain lidocaine: 2 mL  40 mg/mL triamcinolone acetonide: 1 mL    Patient or patient representative verbalized consent for the use of Ambient Listening during the visit with  Bossman Mcneal MD for chart documentation. 3/31/2025  07:22 CDT    Bossman Mcneal MD, PhD  "

## 2025-04-16 ENCOUNTER — OFFICE VISIT (OUTPATIENT)
Age: 80
End: 2025-04-16
Payer: MEDICARE

## 2025-04-16 VITALS — HEIGHT: 67 IN | WEIGHT: 207 LBS | BODY MASS INDEX: 32.49 KG/M2

## 2025-04-16 DIAGNOSIS — S40.021A CONTUSION OF RIGHT UPPER ARM, INITIAL ENCOUNTER: ICD-10-CM

## 2025-04-16 DIAGNOSIS — M75.101 TEAR OF RIGHT ROTATOR CUFF, UNSPECIFIED TEAR EXTENT, UNSPECIFIED WHETHER TRAUMATIC: Primary | ICD-10-CM

## 2025-04-16 NOTE — PROGRESS NOTES
[facility] Orthopedics & Sports Medicine  Bossman Mcneal MD, PhD  Wu Mcneal PA-C    CHIEF COMPLAINT  Follow-up of the Right Shoulder (Patient presents to the office today for right shoulder follow up. Injection given on 03/27/2025. Patient has improved with pain but still has decreased ROM. )       HISTORY OF PRESENT ILLNESS    History of Present Illness  The patient is a 79-year-old male who presents for follow-up on his shoulder.    Persistent limitations in shoulder mobility are reported, although there is an improvement in pain levels following the injection administered 4 weeks ago. Activities such as operating a tractor and a zero-turn  have been possible. A recent incident involved dropping the lid of his Prat[employer]ha while replacing parts, resulting in a bruise on the shoulder/upper arm. Prolonged computer use tends to exacerbate stiffness in the shoulder. Previous physical therapy was undertaken at [facility], and rubber bands are currently used for therapeutic exercises.         HISTORY    Current Outpatient Medications   Medication Instructions    albuterol sulfate  (90 Base) MCG/ACT inhaler 2 puffs, Every 4 Hours PRN    allopurinol (ZYLOPRIM) 300 mg, Every Morning    aspirin 81 mg, Oral, Daily    atorvastatin (LIPITOR) 20 mg, Nightly    empagliflozin (JARDIANCE) 12.5 mg, Daily    ferrous sulfate 325 mg, Daily With Breakfast    furosemide (LASIX) 20 mg, Every Morning    levothyroxine (SYNTHROID, LEVOTHROID) 25 mcg, Oral, Every Early Morning    metoprolol succinate XL (TOPROL-XL) 50 mg, Every Morning    pantoprazole (PROTONIX) 40 mg, Every Morning    potassium chloride (KLOR-CON M20) 20 MEQ CR tablet 20 mEq, Oral, Daily    spironolactone (ALDACTONE) 25 mg, Every Morning    tamsulosin (FLOMAX) 0.4 MG capsule 24 hr capsule 1 capsule, Every Evening         reports that he quit smoking about 20 years ago. His smoking use included cigarettes. He has been  exposed to tobacco smoke. He has never used smokeless tobacco. He reports that he does not currently use alcohol after a past usage of about 11.0 standard drinks of alcohol per week. He reports that he does not use drugs.    Past Medical History:   Diagnosis Date    Arrhythmia     Arthritis     Asthma     Atrial fibrillation     Cancer     skin    Chronic anticoagulation 5/18/2023    Clotting disorder     COPD (chronic obstructive pulmonary disease)     GERD (gastroesophageal reflux disease)     History of transfusion     1992 bleeding ulcers dr gonzalez    Hyperlipidemia     Hypertension     Non-ischemic cardiomyopathy 10/14/2021        Past Surgical History:   Procedure Laterality Date    ATRIAL APPENDAGE EXCLUSION LEFT WITH TRANSESOPHAGEAL ECHOCARDIOGRAM Right 09/12/2023    Procedure: Atrial Appendage Occlusion;  Surgeon: Daniel Clayton MD;  Location: [facility] CATH INVASIVE LOCATION;  Service: Cardiology;  Laterality: Right;    CARDIAC CATHETERIZATION      CARDIOVERSION      10/21 and 11/21 dr dawson richardson    CATARACT EXTRACTION Bilateral     laser juanita yoni richardson    COLONOSCOPY N/A 04/15/2022    Procedure: COLONOSCOPY WITH ANESTHESIA;  Surgeon: Fly Seymour DO;  Location: [facility] ENDOSCOPY;  Service: Gastroenterology;  Laterality: N/A;  pre anemia, screen  post polyps, diverticulosis  Norberto Hannah MD    COLONOSCOPY N/A 06/30/2023    Procedure: COLONOSCOPY WITH ANESTHESIA;  Surgeon: Fly Seymour DO;  Location: [facility] ENDOSCOPY;  Service: Gastroenterology;  Laterality: N/A;  Pre: Anemia;  Post: Diverticulosis;  Norberto Candelario MD    ENDOSCOPY N/A 05/09/2023    Procedure: ESOPHAGOGASTRODUODENOSCOPY WITH ANESTHESIA;  Surgeon: Fly Seymour DO;  Location: [facility] ENDOSCOPY;  Service: Gastroenterology;  Laterality: N/A;  Pre: Anemia  Post: Duodenitis, Gastritis  Norberto Candelario MD    JOINT REPLACEMENT Bilateral     hip    SKIN CANCER EXCISION Right 10/2023    TOTAL SHOULDER  ARTHROPLASTY W/ DISTAL CLAVICLE EXCISION Left 01/03/2023    Procedure: LEFT REVERSE TOTAL SHOULDER ARTHROPLASTY;  Surgeon: Callum Hernandez MD;  Location: [facility];  Service: Orthopedics;  Laterality: Left;        PHYSICAL EXAM  Constitutional: The patient is in no apparent distress and generally well-appearing. The patient hears me clearly and answers questions appropriately.   Musculoskeletal:  Physical Exam  Skin: Bruising noted on the shoulder, appears to be healing well.    Musculoskeletal:  Right Shoulder:  - Limited range of motion.  - Bruising noted anterior upper arm      IMAGING    XR Shoulder 2+ View Right  Result Date: 3/27/2025  Narrative: EXAMINATION:  XR SHOULDER 2+ VW RIGHT-  3/27/2025 7:39 AM  HISTORY: M25.511-Pain in right shoulder.  COMPARISON: No comparison study.  TECHNIQUE: 4 views were obtained. Gleamer AI fracture analysis was utilized.  FINDINGS: There is no fracture. The joint spaces are fairly well-preserved. On the image with external rotation, there is somewhat limited space between the acromion and humeral head. There is mild roughening of the surface of the greater tuberosity. The visualized right lung is clear. The visualized right ribs are intact.       Impression: 1. No evidence of acute fracture. 2. Joint spaces are fairly well-preserved. 3. Limited space between the acromion and humeral head on the AP image with external rotation raising the possibility of rotator cuff disease. There is also roughening of the surface of the greater tuberosity which may also be seen in patients with rotator cuff disease.   This report was signed and finalized on 3/27/2025 11:30 AM by Dr. Miguel Angel Castillo MD.         Results         ASSESSMENT & PLAN  Diagnoses and all orders for this visit:    1. Tear of right rotator cuff, unspecified tear extent, unspecified whether traumatic (Primary)    2. Contusion of right upper arm, initial encounter         Assessment & Plan  1. Shoulder pain:  Limited  range of motion and pain improvement post-injection. Likely rotator cuff tear. Bruise from new contusion has been improving, nothing more needed at present.     Maintain an active lifestyle and perform shoulder-strengthening exercises. Avoid heavy lifting with the affected arm extended too far in front. Use rubber bands for therapeutic exercises. Referral for physical therapy offered but declined. Consider another injection if pain recurs.    Follow-up in 2 to 3 months.    Continue home exercise therapy  Consider MRI if patient interested in surgery  Follow up: 2-3 months - can repeat injection if pain has returned        Patient or patient representative verbalized consent for the use of Ambient Listening during the visit with  Bossman Mcneal MD for chart documentation. 4/16/2025  09:37 CDT      This document has been signed by Bossman Mcneal MD on April 16, 2025 09:24 CDT

## 2025-05-27 ENCOUNTER — OFFICE VISIT (OUTPATIENT)
Dept: CARDIOLOGY | Facility: CLINIC | Age: 80
End: 2025-05-27
Payer: MEDICARE

## 2025-05-27 VITALS
OXYGEN SATURATION: 99 % | DIASTOLIC BLOOD PRESSURE: 78 MMHG | BODY MASS INDEX: 31.71 KG/M2 | HEART RATE: 70 BPM | WEIGHT: 202 LBS | SYSTOLIC BLOOD PRESSURE: 116 MMHG | HEIGHT: 67 IN

## 2025-05-27 DIAGNOSIS — I48.21 PERMANENT ATRIAL FIBRILLATION: ICD-10-CM

## 2025-05-27 DIAGNOSIS — I50.32 CHRONIC DIASTOLIC (CONGESTIVE) HEART FAILURE: Primary | ICD-10-CM

## 2025-05-27 DIAGNOSIS — Z95.818 PRESENCE OF WATCHMAN LEFT ATRIAL APPENDAGE CLOSURE DEVICE: ICD-10-CM

## 2025-05-27 PROCEDURE — 93000 ELECTROCARDIOGRAM COMPLETE: CPT | Performed by: NURSE PRACTITIONER

## 2025-05-27 PROCEDURE — 1159F MED LIST DOCD IN RCRD: CPT | Performed by: NURSE PRACTITIONER

## 2025-05-27 PROCEDURE — 1160F RVW MEDS BY RX/DR IN RCRD: CPT | Performed by: NURSE PRACTITIONER

## 2025-05-27 PROCEDURE — 99214 OFFICE O/P EST MOD 30 MIN: CPT | Performed by: NURSE PRACTITIONER

## 2025-05-27 PROCEDURE — 3074F SYST BP LT 130 MM HG: CPT | Performed by: NURSE PRACTITIONER

## 2025-05-27 PROCEDURE — 3078F DIAST BP <80 MM HG: CPT | Performed by: NURSE PRACTITIONER

## 2025-05-27 NOTE — PROGRESS NOTES
Subjective:     Encounter Date: 05/27/25      Patient ID: Kristian Mir is a 80 y.o. male.    Chief Complaint: f/u permanent afib, diastolic CHF     History of Present Illness    78-year-old follow-up for permanent atrial fibrillation and chronic diastolic CHF.  He had a watchman placed September 2023.  More recently he had satisfactory findings on his September 2024 MALCOLM regarding the watchman.  He has a normal LVEF.  He had improvement in volume status when starting Jardiance in the past.    By way of review, he underwent successful implant of a 24 mm Watchman FLX device on 9/12/2023.  Previously, he been on Xarelto for CVA prophylaxis, but was briefly admitted to [facility] in early March 2023 after outpatient labs revealed a hemoglobin around 6.  He was given 2 units at [facility] but there is no mention regarding work-up for anemia.  At his subsequent follow-up visit in our office, tt was also noted that he had a history of a gastric ulcer a few decades prior.  He reported when this bled he was taking baby aspirin every day with extra aspirin as needed for aches and pains at that time.  He was back on Xarelto at that time, and then later saw GI and had an endoscopy and colonoscopy, which revealed duodenitis, gastritis and diverticulosis respectively.  He was in the ER on 6/19 with a hemoglobin of 4.7 and received 3 units of PRBCs.  He was hospitalized again 7/22 through 7/25 for atrial fibrillation, with rapid ventricular response, anemia and diastolic CHF.  He again received blood transfusion and was also treated with rate controlling medications and IV Lasix.  It appears he was back in the ER short of breath on 7/26.  He later saw Dr. Candelario on 8/3 and Jardiance was added to his medical therapy and Lasix was increased to 20 mg twice daily for acute diastolic CHF.  He followed up with Dr. Seymour on 8/9, who felt that the source of his blood loss anemia might be bleeding AVMs in the  small intestine.  He noted he would be a good candidate for Watchman device placement with uninterrupted dual antiplatelet therapy for 6-months, with close monitoring of CBCs and transfusions as needed.   After successful watchman implant on 9/12/2023, he was discharged home on 9/13/2023 with instructions to take aspirin and Plavix for 6 months, no longer needed Xarelto.      He was doing well when he saw Dr. Clayton May 2024.  He was taking aspirin monotherapy per usual post watchman protocol and walking about 1/4 mile a day without any issues.  He continues to take Lasix daily.  No changes were made.    Today the patient tells me he quit drinking alcohol 10/21/2024 and he reports he was hospitalized for DTs shortly thereafter.  He states that since recovering from this he has been feeling well.  He denies any bleeding issues.  He denies chest pain, shortness of breath, palpitations.  He reports well-controlled blood pressure.  He confirms he is compliant with his aspirin.  He continues to take daily Lasix as well as Jardiance and spironolactone.    He has lost about 20 to 25 pounds over the past year.      The following portions of the patient's history were reviewed and updated as appropriate: allergies, current medications, past family history, past medical history, past social history, past surgical history and problem list.    Review of Systems   Constitutional: Positive for weight loss. Negative for malaise/fatigue.   Cardiovascular:  Positive for leg swelling. Negative for chest pain, claudication, dyspnea on exertion, near-syncope, orthopnea, palpitations, paroxysmal nocturnal dyspnea and syncope.   Respiratory:  Negative for cough and shortness of breath.    Hematologic/Lymphatic: Does not bruise/bleed easily.   Musculoskeletal:  Negative for falls.   Gastrointestinal:  Negative for bloating.   Neurological:  Negative for dizziness, light-headedness and weakness.       Current Outpatient Medications:      albuterol sulfate  (90 Base) MCG/ACT inhaler, Inhale 2 puffs Every 4 (Four) Hours As Needed for Wheezing., Disp: , Rfl:     allopurinol (ZYLOPRIM) 300 MG tablet, Take 1 tablet by mouth Every Morning., Disp: , Rfl:     aspirin 81 MG EC tablet, Take 1 tablet by mouth Daily., Disp: , Rfl:     atorvastatin (LIPITOR) 40 MG tablet, Take 0.5 tablets by mouth Every Night., Disp: , Rfl:     empagliflozin (JARDIANCE) 25 MG tablet tablet, Take 0.5 tablets by mouth Daily., Disp: , Rfl:     ferrous sulfate 325 (65 FE) MG tablet, Take 1 tablet by mouth Daily With Breakfast., Disp: , Rfl:     furosemide (LASIX) 20 MG tablet, Take 1 tablet by mouth Every Morning., Disp: , Rfl:     levothyroxine (SYNTHROID, LEVOTHROID) 25 MCG tablet, Take 1 tablet by mouth Every Morning., Disp: 90 tablet, Rfl: 2    metoprolol succinate XL (TOPROL-XL) 100 MG 24 hr tablet, Take 0.5 tablets by mouth Every Morning., Disp: , Rfl:     pantoprazole (PROTONIX) 40 MG EC tablet, Take 1 tablet by mouth Every Morning., Disp: , Rfl:     potassium chloride (KLOR-CON M20) 20 MEQ CR tablet, Take 1 tablet by mouth Daily., Disp: 10 tablet, Rfl: 0    spironolactone (ALDACTONE) 25 MG tablet, Take 1 tablet by mouth Every Morning., Disp: , Rfl:     tamsulosin (FLOMAX) 0.4 MG capsule 24 hr capsule, Take 1 capsule by mouth Every Evening., Disp: , Rfl:        Objective:      Vitals:    05/27/25 1443   BP: 116/78   Pulse: 70   SpO2: 99%       Weight -  202 lbs    Vitals and nursing note reviewed.   Constitutional:       General: Not in acute distress.     Appearance: Well-developed and not in distress. Not diaphoretic.   Neck:      Vascular: No JVD or JVR. JVD normal.   Pulmonary:      Effort: Pulmonary effort is normal. No respiratory distress.      Breath sounds: Normal breath sounds.   Cardiovascular:      Normal rate. Irregularly irregular rhythm.      Murmurs: There is no murmur.   Edema:     Peripheral edema (moderate BLE edema) present.  Abdominal:       Tenderness: There is no abdominal tenderness.   Skin:     General: Skin is warm and dry.   Neurological:      Mental Status: Alert, oriented to person, place, and time and oriented to person, place and time.         Lab Review:   Lab Results   Component Value Date    GLUCOSE 98 03/18/2025    BUN 13 03/18/2025    CREATININE 1.05 03/18/2025    EGFR 72.7 03/18/2025    BCR 12.4 03/18/2025    K 3.9 03/18/2025    CO2 25.0 03/18/2025    CALCIUM 9.1 03/18/2025    ALBUMIN 3.9 03/18/2025    BILITOT 0.8 03/18/2025    AST 36 03/18/2025    ALT 18 03/18/2025      Lab Results   Component Value Date    WBC 5.63 03/18/2025    HGB 11.4 (L) 03/18/2025    HCT 34.6 (L) 03/18/2025    .9 (H) 03/18/2025     03/18/2025            Results for orders placed during the hospital encounter of 09/30/24    Adult Transesophageal Echo ([facility]ALCOLM) W/ Cont if Necessary Per Protocol    Interpretation Summary    24mm Watchman FLX device well-seated in the left atrial appendage, fully covering its ostium with no evidence of device related thrombus or flow around or through the device.    Left ventricular systolic function is normal.    The left atrial cavity is dilated.    The right atrial cavity is dilated.    Limited study to assess for the above.            ECG 12 Lead    Date/Time: 5/27/2025 3:21 PM  Performed by: Misty Castillo APRN    Authorized by: Misty Castillo APRN  Comparison: compared with previous ECG from 5/16/2024  Similar to previous ECG  Rhythm: atrial fibrillation  BPM: 70  Other findings: low voltage    Clinical impression: abnormal EKG          Assessment/Plan:     Problem List Items Addressed This Visit          Cardiac and Vasculature    Permanent atrial fibrillation    Overview   CHADS-VASc Risk Assessment              4 Total Score    1 Hypertension    1 CHF    2 Age >/= 75        Criteria that do not apply:    DM    PRIOR STROKE/TIA/THROMBO    Vascular Disease    Age 65-74    Sex: Female          Diagnosed on ECG in  [place] ER 8/29/21  Started on xarelto by PCP ([place]) 9/21/21)    Failed initial cardioversion on 10/20/2021; started on antiarrhythmic  Failed repeat cardioversion on antiarrhythmic therapy 11/5/2021, so antiarrhythmic therapy stopped and labeled with permanent atrial fibrillation         Chronic diastolic (congestive) heart failure - Primary    Presence of Watchman left atrial appendage closure device    Overview   24mm Watchman FLX (9/12/23, [facility])              Recommendations/plans:    1.  Permanent atrial fibrillation, status post Watchman implant: Established problem, stable meaning rate controlled and asymptomatic.      -Continue current dose of Toprol-XL for rate control  -Continue usual post-Watchman medication per protocol -  aspirin 81 mg daily for life.     2.  Chronic diastolic CHF: Stable; did experience significant improvement after initiation of Jardiance.  Chronic leg swelling on exam today, and otherwise appears euvolemic.    -Continue Jard[facility] ([facility]) and Lasix 20 mg daily  Continue Aldactone  -Heart healthy low-sodium diet  -Daily weights    3.  Hypertension: Well-controlled.  Continue current medical therapy.    Follow up 12 months, or sooner with new or worsening symptoms    JONG Green

## 2025-07-16 ENCOUNTER — OFFICE VISIT (OUTPATIENT)
Age: 80
End: 2025-07-16
Payer: MEDICARE

## 2025-07-16 VITALS — BODY MASS INDEX: 31.71 KG/M2 | HEIGHT: 67 IN | WEIGHT: 202 LBS

## 2025-07-16 DIAGNOSIS — M75.101 TEAR OF RIGHT ROTATOR CUFF, UNSPECIFIED TEAR EXTENT, UNSPECIFIED WHETHER TRAUMATIC: Primary | ICD-10-CM

## 2025-07-16 RX ORDER — LIDOCAINE HYDROCHLORIDE 10 MG/ML
2 INJECTION, SOLUTION INFILTRATION; PERINEURAL ONCE
Status: COMPLETED | OUTPATIENT
Start: 2025-07-16 | End: 2025-07-16

## 2025-07-16 RX ORDER — TRIAMCINOLONE ACETONIDE 40 MG/ML
40 INJECTION, SUSPENSION INTRA-ARTICULAR; INTRAMUSCULAR ONCE
Status: COMPLETED | OUTPATIENT
Start: 2025-07-16 | End: 2025-07-16

## 2025-07-16 RX ADMIN — TRIAMCINOLONE ACETONIDE 40 MG: 40 INJECTION, SUSPENSION INTRA-ARTICULAR; INTRAMUSCULAR at 10:03

## 2025-07-16 RX ADMIN — LIDOCAINE HYDROCHLORIDE 2 ML: 10 INJECTION, SOLUTION INFILTRATION; PERINEURAL at 10:03

## 2025-07-17 NOTE — PROGRESS NOTES
[facility]  Bossman Mcneal MD, PhD  Wu Mcneal PA-C    CHIEF COMPLAINT  Follow-up of the Right Shoulder (Patient presents today for left shoulder follow up. Last injection given on 3/27/25. Patient is requesting another injection, patient states pain has improved and has been doing HEP. )       HISTORY OF PRESENT ILLNESS    History of Present Illness  The patient is a 80-year-old male who presents for follow-up on his shoulder.    He reports an improvement in his shoulder condition, although he still experiences some discomfort. He is able to lift his arm with assistance or exertion. There is no pain upon touch. He expresses a desire for increased mobility in his shoulder. He has been using 2-pound weights for strength training, which does not exacerbate his pain. He has not been taking any specific medication for his shoulder pain and has never used pain medication. He has not undergone any physical therapy for his shoulder.    PAST SURGICAL HISTORY:  He has had both hips replaced and also had surgery on his left shoulder.    SOCIAL HISTORY  Exercise: Uses 2-pound weights for shoulder exercises.  Alcohol: Stopped drinking alcohol since October 23, 2024.       HISTORY    Current Outpatient Medications   Medication Instructions    albuterol sulfate  (90 Base) MCG/ACT inhaler 2 puffs, Every 4 Hours PRN    allopurinol (ZYLOPRIM) 300 mg, Every Morning    aspirin 81 mg, Oral, Daily    atorvastatin (LIPITOR) 20 mg, Nightly    empagliflozin (JARDIANCE) 12.5 mg, Daily    ferrous sulfate 325 mg, Daily With Breakfast    furosemide (LASIX) 20 mg, Every Morning    levothyroxine (SYNTHROID, LEVOTHROID) 25 mcg, Oral, Every Early Morning    metoprolol succinate XL (TOPROL-XL) 50 mg, Every Morning    pantoprazole (PROTONIX) 40 mg, Every Morning    potassium chloride (KLOR-CON M20) 20 MEQ CR tablet 20 mEq, Oral, Daily    spironolactone (ALDACTONE) 25 mg, Every Morning     tamsulosin (FLOMAX) 0.4 MG capsule 24 hr capsule 1 capsule, Every Evening         reports that he quit smoking about 20 years ago. His smoking use included cigarettes. He has been exposed to tobacco smoke. He has never used smokeless tobacco. He reports that he does not currently use alcohol after a past usage of about 11.0 standard drinks of alcohol per week. He reports that he does not use drugs.    Past Medical History:   Diagnosis Date    Arrhythmia     Arthritis     Asthma     Atrial fibrillation     Cancer     skin    Chronic anticoagulation 5/18/2023    Clotting disorder     COPD (chronic obstructive pulmonary disease)     GERD (gastroesophageal reflux disease)     History of transfusion     1992 bleeding ulcers dr gonzalez    Hyperlipidemia     Hypertension     Non-ischemic cardiomyopathy 10/14/2021        Past Surgical History:   Procedure Laterality Date    ATRIAL APPENDAGE EXCLUSION LEFT WITH TRANSESOPHAGEAL ECHOCARDIOGRAM Right 09/12/2023    Procedure: Atrial Appendage Occlusion;  Surgeon: Daniel Clayton MD;  Location: [facility] CATH INVASIVE LOCATION;  Service: Cardiology;  Laterality: Right;    CARDIAC CATHETERIZATION      CARDIOVERSION      10/21 and 11/21 dr dawson richardson    CATARACT EXTRACTION Bilateral     laser juanita richardson    COLONOSCOPY N/A 04/15/2022    Procedure: COLONOSCOPY WITH ANESTHESIA;  Surgeon: Fly Seymour DO;  Location: [facility] ENDOSCOPY;  Service: Gastroenterology;  Laterality: N/A;  pre anemia, screen  post polyps, diverticulosis  Norberto Hannah MD    COLONOSCOPY N/A 06/30/2023    Procedure: COLONOSCOPY WITH ANESTHESIA;  Surgeon: Fly Seymour DO;  Location: [facility] ENDOSCOPY;  Service: Gastroenterology;  Laterality: N/A;  Pre: Anemia;  Post: Diverticulosis;  Norberto Candelario MD    ENDOSCOPY N/A 05/09/2023    Procedure: ESOPHAGOGASTRODUODENOSCOPY WITH ANESTHESIA;  Surgeon: Fly Seymour DO;  Location: [facility] ENDOSCOPY;  Service: Gastroenterology;   Laterality: N/A;  Pre: Anemia  Post: Duodenitis, Gastritis  Norberto Candelario MD    JOINT REPLACEMENT Bilateral     hip    SKIN CANCER EXCISION Right 10/2023    TOTAL SHOULDER ARTHROPLASTY W/ DISTAL CLAVICLE EXCISION Left 01/03/2023    Procedure: LEFT REVERSE TOTAL SHOULDER ARTHROPLASTY;  Surgeon: Callum Hernandez MD;  Location: [facility];  Service: Orthopedics;  Laterality: Left;        PHYSICAL EXAM  Constitutional: The patient is in no apparent distress and generally well-appearing. The patient hears me clearly and answers questions appropriately.   Musculoskeletal:  Physical Exam  Musculoskeletal:  Right shoulder: Positive shrug sign noted. Limited range of motion with difficulty raising the arm without assistance. No tenderness on palpation.      IMAGING    No results found.     Results         ASSESSMENT & PLAN  Diagnoses and all orders for this visit:    1. Tear of right rotator cuff, unspecified tear extent, unspecified whether traumatic (Primary)  -     lidocaine (XYLOCAINE) 1 % injection 2 mL  -     triamcinolone acetonide (KENALOG-40) injection 40 mg    As discussed previously patient almost certainly has a large right rotator cuff tear.  He has been improving with conservative measures including steroid injections and home exercise therapy.  He is not interested in surgical referral at this time.  He would like to repeat the steroid injection to help facilitate further progress with his strengthening efforts.  I did discuss with him that while it may help improve his pain and symptoms I did not feel like the steroid injections are going to restore all of his range of motion and strength and he may not be able to do that even with physical therapy.      Continue home rehab efforts  Right subacromial injection today  Follow up: 3 months or as needed    Right shoulder injection was discussed with the patient in detail, including indication, risks, benefits, and alternatives. Risks include but are  "not limited to: incomplete symptom resolution, injection site pain, local irritation, bleeding, infection, allergic reaction, elevated blood pressure and blood sugar. Verbal consent was given for the procedure.  Injection site was identified by physical examination and cleaned with Chloraprep and alcohol swabs. Prior to needle insertion, ethyl chloride spray was used for surface anesthesia.  A 22-gauge, 1.5\" needle was directed to the joint from a POSTERIOR SUBACROMIAL approach. Injectate was passed into the shoulder without difficulty. The needle was removed and a simple bandage was applied. The procedure was tolerated well without difficulty.    Injection mixture:  1% plain lidocaine: 2 mL  40 mg/mL triamcinolone acetonide: 1 mL    Patient or patient representative verbalized consent for the use of Ambient Listening during the visit with  Bossman Mcneal MD for chart documentation. 7/17/2025  14:22 CDT      This document has been signed by Bossman Mcneal MD on July 17, 2025 14:20 CDT    "

## 2025-08-08 ENCOUNTER — OFFICE VISIT (OUTPATIENT)
Dept: OTOLARYNGOLOGY | Facility: CLINIC | Age: 80
End: 2025-08-08
Payer: OTHER GOVERNMENT

## 2025-08-08 VITALS
HEART RATE: 67 BPM | WEIGHT: 205 LBS | DIASTOLIC BLOOD PRESSURE: 64 MMHG | BODY MASS INDEX: 32.18 KG/M2 | SYSTOLIC BLOOD PRESSURE: 123 MMHG | HEIGHT: 67 IN | TEMPERATURE: 97.5 F

## 2025-08-08 DIAGNOSIS — H91.8X3 ASYMMETRICAL HEARING LOSS: ICD-10-CM

## 2025-08-08 DIAGNOSIS — H90.3 SENSORINEURAL HEARING LOSS, BILATERAL: Primary | ICD-10-CM

## 2025-08-08 DIAGNOSIS — H93.293 IMPAIRED AUDITORY DISCRIMINATION, BILATERAL: ICD-10-CM

## 2025-08-08 PROCEDURE — 99213 OFFICE O/P EST LOW 20 MIN: CPT | Performed by: NURSE PRACTITIONER

## 2025-08-29 ENCOUNTER — HOSPITAL ENCOUNTER (OUTPATIENT)
Dept: CT IMAGING | Facility: HOSPITAL | Age: 80
Discharge: HOME | End: 2025-08-29
Admitting: NURSE PRACTITIONER
Payer: OTHER GOVERNMENT

## 2025-08-29 PROCEDURE — 70482 CT ORBIT/EAR/FOSSA W/O&W/DYE: CPT

## 2025-08-29 PROCEDURE — 25510000001 IOPAMIDOL 61 % SOLUTION: Performed by: NURSE PRACTITIONER

## 2025-08-29 RX ORDER — IOPAMIDOL 612 MG/ML
100 INJECTION, SOLUTION INTRAVASCULAR
Status: COMPLETED | OUTPATIENT
Start: 2025-08-29 | End: 2025-08-29

## 2025-08-29 RX ADMIN — IOPAMIDOL 100 ML: 612 INJECTION, SOLUTION INTRAVENOUS at 11:17

## (undated) DEVICE — BAPTIST TURNOVER KIT: Brand: MEDLINE INDUSTRIES, INC.

## (undated) DEVICE — T-MAX DISPOSABLE FACE MASK 8 PER BOX

## (undated) DEVICE — SNAR POLYP CAPTIVATOR MICROHEX 13 240CM

## (undated) DEVICE — SUT ETHIB 5 V37 30IN MB66G

## (undated) DEVICE — KT NDL GUIDE STRL 18GA

## (undated) DEVICE — THE CHANNEL CLEANING BRUSH IS A NYLON FLEXI BRUSH ATTACHED TO A FLEXIBLE PLASTIC SHEATH DESIGNED TO SAFELY REMOVE DEBRIS FROM FLEXIBLE ENDOSCOPES.

## (undated) DEVICE — PAD, DEFIB, ADULT, RADIOTRANS, PHYSIO: Brand: MEDLINE

## (undated) DEVICE — Device

## (undated) DEVICE — DRAPE,ANGIO,BRACH,STERILE,38X44: Brand: MEDLINE

## (undated) DEVICE — GLV SURG SENSICARE PI ORTHO SZ8 LF STRL

## (undated) DEVICE — Device: Brand: DEFENDO AIR/WATER/SUCTION AND BIOPSY VALVE

## (undated) DEVICE — SOL IRR NACL 0.9PCT BT 1000ML

## (undated) DEVICE — INTENDED FOR TISSUE SEPARATION, AND OTHER PROCEDURES THAT REQUIRE A SHARP SURGICAL BLADE TO PUNCTURE OR CUT.: Brand: BARD-PARKER ® STAINLESS STEEL BLADES

## (undated) DEVICE — CATH F6INF PIG 145 110CM 6SH: Brand: INFINITI

## (undated) DEVICE — ACCESS SHEATH WITH DILATOR: Brand: WATCHMAN FXD CURVE™ ACCESS SYSTEM

## (undated) DEVICE — SPNG GZ STRL 2S 4X4 12PLY

## (undated) DEVICE — YANKAUER,BULB TIP WITH VENT: Brand: ARGYLE

## (undated) DEVICE — ST PIN FIX TEMP UNIVERS REVERS W/OSTEO/GUIDE/PIN 2.4MM STRL

## (undated) DEVICE — BNDG GZ SOF-FORM CONFRM 2X75IN LF STRL

## (undated) DEVICE — ADHS SKIN PREMIERPRO EXOFIN TOPICAL HI/VISC .5ML

## (undated) DEVICE — ANTIBACTERIAL UNDYED BRAIDED (POLYGLACTIN 910), SYNTHETIC ABSORBABLE SUTURE: Brand: COATED VICRYL

## (undated) DEVICE — 1010 S-DRAPE TOWEL DRAPE 10/BX: Brand: STERI-DRAPE™

## (undated) DEVICE — CATH F6INF TL MP A2 100CM 2SH: Brand: INFINITI

## (undated) DEVICE — TRAP FLD MINIVAC MEGADYNE 100ML

## (undated) DEVICE — GLV SURG DERMASSURE GRN LF PF 8.0

## (undated) DEVICE — OPTIFOAM GENTLE SA, POSTOP, 4X8: Brand: MEDLINE

## (undated) DEVICE — 1 X VERSACROSS TRANSSEPTAL SHEATH (INCLUDING  1 X J-TIP GUIDEWIRE); 1 X VERSACROSS RF WIRE (INCLUDING 1 X CONNECTOR CABLE (SINGLE USE)); 1 X DISPERSIVE ELECTRODE: Brand: VERSACROSS ACCESS SOLUTION

## (undated) DEVICE — CUFF,BP,DISP,1 TUBE,ADULT,HP: Brand: MEDLINE

## (undated) DEVICE — PK CATH CARD 30 CA/4

## (undated) DEVICE — PINNACLE INTRODUCER SHEATH: Brand: PINNACLE

## (undated) DEVICE — DUAL CUT SAGITTAL BLADE

## (undated) DEVICE — 3M™ STERI-DRAPE™ U-DRAPE 1015: Brand: STERI-DRAPE™

## (undated) DEVICE — THE SINGLE USE ETRAP – POLYP TRAP IS USED FOR SUCTION RETRIEVAL OF ENDOSCOPICALLY REMOVED POLYPS.: Brand: ETRAP

## (undated) DEVICE — 4-PORT MANIFOLD: Brand: NEPTUNE 2

## (undated) DEVICE — SENSR O2 OXIMAX FNGR A/ 18IN NONSTR

## (undated) DEVICE — FRCP BX RADJAW4 NDL 2.8 240 STD OG

## (undated) DEVICE — PK SHLDR 30

## (undated) DEVICE — TBG SMPL FLTR LINE NASL 02/C02 A/ BX/100

## (undated) DEVICE — CANN NASL ETCO2 LO/FLO A/

## (undated) DEVICE — CONMED SCOPE SAVER BITE BLOCK, 20X27 MM: Brand: SCOPE SAVER

## (undated) DEVICE — HANDPIECE SET WITH HIGH FLOW TIP AND SUCTION TUBE: Brand: INTERPULSE

## (undated) DEVICE — 3M™ IOBAN™ 2 ANTIMICROBIAL INCISE DRAPE 6651EZ: Brand: IOBAN™ 2

## (undated) DEVICE — SOLIDIFIER LIQUI LOC PLUS 2000CC

## (undated) DEVICE — MASK,OXYGEN,MED CONC,ADLT,7' TUB, UC: Brand: PENDING

## (undated) DEVICE — Device: Brand: MEDEX